# Patient Record
Sex: FEMALE
[De-identification: names, ages, dates, MRNs, and addresses within clinical notes are randomized per-mention and may not be internally consistent; named-entity substitution may affect disease eponyms.]

---

## 2018-09-06 ENCOUNTER — HOSPITAL ENCOUNTER (OUTPATIENT)
Dept: HOSPITAL 14 - H.ER | Age: 58
Setting detail: OBSERVATION
LOS: 1 days | Discharge: HOME | End: 2018-09-07
Attending: INTERNAL MEDICINE | Admitting: INTERNAL MEDICINE
Payer: COMMERCIAL

## 2018-09-06 DIAGNOSIS — J45.909: ICD-10-CM

## 2018-09-06 DIAGNOSIS — F90.9: ICD-10-CM

## 2018-09-06 DIAGNOSIS — Z90.5: ICD-10-CM

## 2018-09-06 DIAGNOSIS — L03.115: ICD-10-CM

## 2018-09-06 DIAGNOSIS — I10: ICD-10-CM

## 2018-09-06 DIAGNOSIS — E11.65: ICD-10-CM

## 2018-09-06 DIAGNOSIS — F31.9: ICD-10-CM

## 2018-09-06 DIAGNOSIS — E66.9: ICD-10-CM

## 2018-09-06 DIAGNOSIS — E03.9: ICD-10-CM

## 2018-09-06 DIAGNOSIS — L25.9: ICD-10-CM

## 2018-09-06 DIAGNOSIS — T81.4XXA: Primary | ICD-10-CM

## 2018-09-06 LAB
BASE EXCESS BLDV CALC-SCNC: 3 MMOL/L (ref 0–2)
BASOPHILS # BLD AUTO: 0.1 K/UL (ref 0–0.2)
BASOPHILS NFR BLD: 0.6 % (ref 0–2)
BUN SERPL-MCNC: 13 MG/DL (ref 7–17)
CALCIUM SERPL-MCNC: 9.2 MG/DL (ref 8.4–10.2)
EOSINOPHIL # BLD AUTO: 0.7 K/UL (ref 0–0.7)
EOSINOPHIL NFR BLD: 6.5 % (ref 0–4)
ERYTHROCYTE [DISTWIDTH] IN BLOOD BY AUTOMATED COUNT: 14 % (ref 11.5–14.5)
ERYTHROCYTE [SEDIMENTATION RATE] IN BLOOD: 21 MM/HR (ref 0–30)
GFR NON-AFRICAN AMERICAN: > 60
HGB BLD-MCNC: 15.2 G/DL (ref 12–16)
LYMPHOCYTES # BLD AUTO: 1.9 K/UL (ref 1–4.3)
LYMPHOCYTES NFR BLD AUTO: 17.9 % (ref 20–40)
MCH RBC QN AUTO: 31.8 PG (ref 27–31)
MCHC RBC AUTO-ENTMCNC: 33.9 G/DL (ref 33–37)
MCV RBC AUTO: 94 FL (ref 81–99)
MONOCYTES # BLD: 0.6 K/UL (ref 0–0.8)
MONOCYTES NFR BLD: 6 % (ref 0–10)
NEUTROPHILS # BLD: 7.2 K/UL (ref 1.8–7)
NEUTROPHILS NFR BLD AUTO: 69 % (ref 50–75)
NRBC BLD AUTO-RTO: 0 % (ref 0–0)
PCO2 BLDV: 46 MMHG (ref 40–60)
PH BLDV: 7.4 [PH] (ref 7.32–7.43)
PLATELET # BLD: 236 K/UL (ref 130–400)
PMV BLD AUTO: 9 FL (ref 7.2–11.7)
RBC # BLD AUTO: 4.77 MIL/UL (ref 3.8–5.2)
VENOUS BLOOD FIO2: 21 %
VENOUS BLOOD GAS PO2: 31 MM/HG (ref 30–55)
WBC # BLD AUTO: 10.4 K/UL (ref 4.8–10.8)

## 2018-09-06 PROCEDURE — 82948 REAGENT STRIP/BLOOD GLUCOSE: CPT

## 2018-09-06 PROCEDURE — 81003 URINALYSIS AUTO W/O SCOPE: CPT

## 2018-09-06 PROCEDURE — 86140 C-REACTIVE PROTEIN: CPT

## 2018-09-06 PROCEDURE — 99284 EMERGENCY DEPT VISIT MOD MDM: CPT

## 2018-09-06 PROCEDURE — 87040 BLOOD CULTURE FOR BACTERIA: CPT

## 2018-09-06 PROCEDURE — 85025 COMPLETE CBC W/AUTO DIFF WBC: CPT

## 2018-09-06 PROCEDURE — 96374 THER/PROPH/DIAG INJ IV PUSH: CPT

## 2018-09-06 PROCEDURE — 87070 CULTURE OTHR SPECIMN AEROBIC: CPT

## 2018-09-06 PROCEDURE — 89051 BODY FLUID CELL COUNT: CPT

## 2018-09-06 PROCEDURE — 93971 EXTREMITY STUDY: CPT

## 2018-09-06 PROCEDURE — 88104 CYTOPATH FL NONGYN SMEARS: CPT

## 2018-09-06 PROCEDURE — 85730 THROMBOPLASTIN TIME PARTIAL: CPT

## 2018-09-06 PROCEDURE — 80048 BASIC METABOLIC PNL TOTAL CA: CPT

## 2018-09-06 PROCEDURE — 85651 RBC SED RATE NONAUTOMATED: CPT

## 2018-09-06 PROCEDURE — 88305 TISSUE EXAM BY PATHOLOGIST: CPT

## 2018-09-06 PROCEDURE — 85610 PROTHROMBIN TIME: CPT

## 2018-09-06 PROCEDURE — 82803 BLOOD GASES ANY COMBINATION: CPT

## 2018-09-06 PROCEDURE — 20610 DRAIN/INJ JOINT/BURSA W/O US: CPT

## 2018-09-06 PROCEDURE — 89060 EXAM SYNOVIAL FLUID CRYSTALS: CPT

## 2018-09-06 PROCEDURE — 36415 COLL VENOUS BLD VENIPUNCTURE: CPT

## 2018-09-06 NOTE — CP.PCM.HP
<VolodymyrLatricia - Last Filed: 18 05:44>





History of Present Illness





- History of Present Illness


History of Present Illness: 


57 yr old F presents to ED with complaint of right knee pain, swelling and 

erythema s/p meniscusectomy 2 wks ago, reports completing a full course of 

Kephlex as prescribed x 10 days. PMHx includes NIDDM Type 2 and hypothyroidism. 

Patient states she went for her 1 week followup with Dr. Galvan and her right 

knee started slightly swelling up, he changed the stitches and redressed the 

wound, restarted Kephlex.  A few days ago she noticed further pain, swelling 

and yellow discharge, she called Dr. Galvan and he told her to come to ED. She 

developed an allergic reaction to the tape surrounding the meniscusectomy site. 





Specialists: Dr. Galvan





PMHx: NIDDM Type 2 and hypothyroidism, hysterectomy, bipolar disorder


SurgHx: recent right knee menisectomy, right nephrectomy, 


FMHx: mother  at 81-mitral valve replaced x 3; father  at 86-colon 

cancer


SocHx: denies tobacco/Etoh or drugs


Medications: Levothyroxine 100 mcg PO QD, Cytomel 25mcg PO QD, Abilify 5mg PO 

QAM, Zoloft 50mg PO QAM, Metformin 500mg PO QD, Klonopin 1.5mg PO QPM, Klonopin 

1mg PRN in 0.5mg doses; Trazodone 100mg QPM, Prazosin 4mg PO QPM, Victoza 1.8 

PO QAM, Adderall PRN, Proair PRN


Allergies: levaquin, latuda, lamictal, IV contrast (anaphylaxis)


Code status: Full code


Emergency contact: Sandee Cruz (sister) [phone]


pharmacy:





ER course: BP  118/83 mmHg, HR 81 bpm, Temp 98.3F, Resp rate 20, SpO2 96% on 

room air


-CBC: wnl, VBG wnl, 


-CMP: random glucose 146, rest wnl, CRP wnl, 


-UA negative for active disease


-ED treatment: NS 1 100mls/hr





Present on Admission





- Present on Admission


Any Indicators Present on Admission: Yes


History of DVT/PE: No


History of Uncontrolled Diabetes: No


Urinary Catheter: No


Decubitus Ulcer Present: No


History Surgical Site Infection Following: Orthopedic Procedures





Review of Systems





- Constitutional


Constitutional: absent: Chills, Fever





- EENT


Eyes: absent: Change in Vision


Nose/Mouth/Throat: absent: Nasal Congestion





- Cardiovascular


Cardiovascular: absent: Chest Pain, Dyspnea





- Respiratory


Respiratory: absent: Cough, Dyspnea, Hemoptysis





- Gastrointestinal


Gastrointestinal: absent: Abdominal Pain, Nausea, Vomiting





- Genitourinary


Genitourinary: absent: Difficulty Urinating, Dysuria





- Musculoskeletal


Musculoskeletal: Limited Range of Motion (right knee due to pain).  absent: 

Muscle Weakness, Numbness, Tingling





- Integumentary


Integumentary: Wounds (right knee meniscusecotmy incision covered with steri 

strips)





- Neurological


Neurological: absent: Abnormal Gait, Confusion, Dizziness, Numbness, Headaches, 

Weakness





- Psychiatric


Psychiatric: Depression (bipolar disorder)





- Endocrine


Endocrine: absent: Polydipsia, Polyphagia, Polyuria





- Hematologic/Lymphatic


Hematologic: absent: Easy Bleeding, Easy Bruising





Meds


Allergies/Adverse Reactions: 


 Allergies











Allergy/AdvReac Type Severity Reaction Status Date / Time


 


Iodinated Contrast- Oral and Allergy  RASH Verified 18 20:51





IV Dye     


 


iodine Allergy  RASH Verified 18 20:51


 


lamotrigine [From Lamictal] Allergy  RASH Verified 18 20:50


 


levofloxacin [From Levaquin] Allergy  RASH Verified 18 20:50


 


lurasidone [From Latuda] Allergy  RASH Verified 18 20:50














Physical Exam





- Constitutional


Appears: No Acute Distress





- Head Exam


Head Exam: ATRAUMATIC, NORMOCEPHALIC





- Eye Exam


Eye Exam: EOMI, PERRL


Pupil Exam: PERRL





- ENT Exam


ENT Exam: Mucous Membranes Moist





- Neck Exam


Neck exam: Positive for: Full Rom.  Negative for: Lymphadenopathy





- Respiratory Exam


Respiratory Exam: Clear to Auscultation Bilateral, NORMAL BREATHING PATTERN.  

absent: Rales, Rhonchi





- Cardiovascular Exam


Cardiovascular Exam: REGULAR RHYTHM, +S1, +S2





- GI/Abdominal Exam


GI & Abdominal Exam: Normal Bowel Sounds, Soft (obese)





- Extremities Exam


Extremities exam: Positive for: full ROM, normal capillary refill, pedal pulses 

present.  Negative for: pedal edema





- Neurological Exam


Neurological exam: Alert, CN II-XII Intact, Oriented x3





- Psychiatric Exam


Psychiatric exam: Normal Affect, Normal Mood





- Skin


Skin Exam: Dry, Warm





Results





- Vital Signs


Recent Vital Signs: 





 Last Vital Signs











Temp  98.3 F   18 20:51


 


Pulse  81   18 20:51


 


Resp  20   18 20:51


 


BP  118/83   18 20:51


 


Pulse Ox  96   18 22:35














- Labs


Result Diagrams: 


 18 21:47





 18 21:47


Labs: 





 Laboratory Results - last 24 hr











  18





  21:47 21:47 22:21


 


WBC   10.4 


 


RBC   4.77 


 


Hgb   15.2 


 


Hct   44.8 


 


MCV   94.0 


 


MCH   31.8 H 


 


MCHC   33.9 


 


RDW   14.0 


 


Plt Count   236 


 


MPV   9.0 


 


Neut % (Auto)   69.0 


 


Lymph % (Auto)   17.9 L 


 


Mono % (Auto)   6.0 


 


Eos % (Auto)   6.5 H 


 


Baso % (Auto)   0.6 


 


Neut # (Auto)   7.2 H 


 


Lymph # (Auto)   1.9 


 


Mono # (Auto)   0.6 


 


Eos # (Auto)   0.7 


 


Baso # (Auto)   0.1 


 


pO2    31


 


VBG pH    7.40


 


VBG pCO2    46


 


VBG HCO3    26.2


 


VBG Total CO2    29.9 H


 


VBG O2 Sat (Calc)    70.6 H


 


VBG Base Excess    3.0 H


 


VBG Potassium    4.0


 


Glucose    148 H


 


Lactate    1.1


 


FiO2    21.0


 


Sodium  140   137.0


 


Potassium  4.1  


 


Chloride  106   104.0


 


Carbon Dioxide  26  


 


Anion Gap  12  


 


BUN  13  


 


Creatinine  0.8  


 


Est GFR ( Amer)  > 60  


 


Est GFR (Non-Af Amer)  > 60  


 


Random Glucose  146 H  


 


Calcium  9.2  


 


Venous Blood Potassium    4.0














Assessment & Plan





- Assessment and Plan (Free Text)


Assessment: 


57 yr old f admitted for cellulitis of RLE with PMHx including NIDDM Type 2 and 

hypothyroidism, hysterectomy, bipolar disorder/anxiety/ADHD.





RLE cellulitis


-acute


-admit to med/surge


-Ancef IV Q8


-orthopedic consult appreciated: Dr. Galvan


-NPO after midnight for possible OR procedure tomorrow


-f/u blood and urine culture





NIDDM Type 2


chronic, controlled


-continue home medications (Victoza 1.8 PO QAM, Prazosin 4mg PO QPM)


-held Metformin 500mg PO QD





Hypothyroidism


-chronic, controlled


-continue home medication (Levothyroxine 100 mcg PO QD, Cytomel 25mcg PO QD)





Bipolar disorder/Depression/ADHD:


-chonic, controlled 


-continue home medications (Abilify 5mg PO QAM, Zoloft 50mg PO QAM, Klonopin 

1.5mg PO QPM, Klonopin 1mg PRN in 0.5mg doses; Trazodone 100mg QPM) Adderall PRN

)























- Date & Time


Date: 18


Time: 22:30





<Chandana Cherry - Last Filed: 18 06:34>





Results





- Vital Signs


Recent Vital Signs: 





 Last Vital Signs











Temp  97.8 F   18 01:04


 


Pulse  76   18 01:04


 


Resp  18   18 01:04


 


BP  104/72   18 01:04


 


Pulse Ox  96   18 02:30














- Labs


Result Diagrams: 


 18 21:47





 18 21:47


Labs: 





 Laboratory Results - last 24 hr











  18





  21:47 21:47 22:21


 


WBC   10.4 


 


RBC   4.77 


 


Hgb   15.2 


 


Hct   44.8 


 


MCV   94.0 


 


MCH   31.8 H 


 


MCHC   33.9 


 


RDW   14.0 


 


Plt Count   236 


 


MPV   9.0 


 


Neut % (Auto)   69.0 


 


Lymph % (Auto)   17.9 L 


 


Mono % (Auto)   6.0 


 


Eos % (Auto)   6.5 H 


 


Baso % (Auto)   0.6 


 


Neut # (Auto)   7.2 H 


 


Lymph # (Auto)   1.9 


 


Mono # (Auto)   0.6 


 


Eos # (Auto)   0.7 


 


Baso # (Auto)   0.1 


 


ESR   21 


 


pO2    31


 


VBG pH    7.40


 


VBG pCO2    46


 


VBG HCO3    26.2


 


VBG Total CO2    29.9 H


 


VBG O2 Sat (Calc)    70.6 H


 


VBG Base Excess    3.0 H


 


VBG Potassium    4.0


 


Glucose    148 H


 


Lactate    1.1


 


FiO2    21.0


 


Sodium  140   137.0


 


Potassium  4.1  


 


Chloride  106   104.0


 


Carbon Dioxide  26  


 


Anion Gap  12  


 


BUN  13  


 


Creatinine  0.8  


 


Est GFR ( Amer)  > 60  


 


Est GFR (Non-Af Amer)  > 60  


 


POC Glucose (mg/dL)   


 


Random Glucose  146 H  


 


Calcium  9.2  


 


Venous Blood Potassium    4.0


 


Urine Color   


 


Urine Clarity   


 


Urine pH   


 


Ur Specific Gravity   


 


Urine Protein   


 


Urine Glucose (UA)   


 


Urine Ketones   


 


Urine Blood   


 


Urine Nitrate   


 


Urine Bilirubin   


 


Urine Urobilinogen   


 


Ur Leukocyte Esterase   


 


Urine RBC (Auto)   


 


Urine Microscopic WBC   


 


Ur Squamous Epith Cells   














  18





  00:22 05:39


 


WBC  


 


RBC  


 


Hgb  


 


Hct  


 


MCV  


 


MCH  


 


MCHC  


 


RDW  


 


Plt Count  


 


MPV  


 


Neut % (Auto)  


 


Lymph % (Auto)  


 


Mono % (Auto)  


 


Eos % (Auto)  


 


Baso % (Auto)  


 


Neut # (Auto)  


 


Lymph # (Auto)  


 


Mono # (Auto)  


 


Eos # (Auto)  


 


Baso # (Auto)  


 


ESR  


 


pO2  


 


VBG pH  


 


VBG pCO2  


 


VBG HCO3  


 


VBG Total CO2  


 


VBG O2 Sat (Calc)  


 


VBG Base Excess  


 


VBG Potassium  


 


Glucose  


 


Lactate  


 


FiO2  


 


Sodium  


 


Potassium  


 


Chloride  


 


Carbon Dioxide  


 


Anion Gap  


 


BUN  


 


Creatinine  


 


Est GFR ( Amer)  


 


Est GFR (Non-Af Amer)  


 


POC Glucose (mg/dL)   98


 


Random Glucose  


 


Calcium  


 


Venous Blood Potassium  


 


Urine Color  Yellow 


 


Urine Clarity  Slighty-cloudy 


 


Urine pH  5.0 


 


Ur Specific Gravity  1.027 


 


Urine Protein  30 


 


Urine Glucose (UA)  Neg 


 


Urine Ketones  Negative 


 


Urine Blood  Negative 


 


Urine Nitrate  Negative 


 


Urine Bilirubin  Negative 


 


Urine Urobilinogen  0.2-1.0 


 


Ur Leukocyte Esterase  Trace 


 


Urine RBC (Auto)  4 H 


 


Urine Microscopic WBC  3 


 


Ur Squamous Epith Cells  1 














Attending/Attestation





- Attestation


I have personally seen and examined this patient.: Yes


I have fully participated in the care of the patient.: Yes


I have reviewed all pertinent clinical information: Yes


Notes (Text): 





18 06:25


I saw and examined this patient shoulder to shoulder with Dr Helm. The 

assessment and plan represent my direct imput.


This is a patient with hx of DM II  who underwent a right Meniscucusectomy 2 

weeks ago  and developed swelling one week later causing the surgeon to change 

the sutures. She now reports oozing of yellowish fluid from a region of the 

incision line and has come to the ED for management.


A&P


#. Right knee infected surgical wound with cellulites


- Dr Galvan on consult (Orthopedic


- NPO after midnight


- Cefazolin


- Pain management





#. DM II with hyperglycemia


- Lispro sliding scale according to accucheck


- Victosa (patient has her own medication


- Metformin on hold





#.  Bipolar disorder 


- Continue Home medication





#. DVT prophylaxis: Start Lovenox post surgery.

## 2018-09-06 NOTE — ED PDOC
Lower Extremity Pain/Injury


Time Seen by Provider: 09/06/18 21:12


Chief Complaint (Nursing): Lower Extremity Problem/Injury


Chief Complaint (Provider): Right knee discomfort


History Per: Patient


History/Exam Limitations: no limitations


Onset/Duration Of Symptoms: Days


Current Symptoms Are (Timing): Still Present


Additional History Per: Patient


Additional Complaint(s): 


56yo female, history of diabetes, with recent right menisceal repair by Dr. Galvan 2 weeks ago. Patient states 6 days ago, she noticed increased swelling 

to her right knee as well as drainage from her wound site. Patient was seen by 

Dr. Galvan yesterday and was started on Keflex. Patient states since yesterday, 

she noted a rash to her knee and reports the area appears even more swollen. 

She denies any fever or chills and states she has good ROM of her right knee.








Past Medical History


Reviewed: Historical Data, Nursing Documentation, Vital Signs


Vital Signs: 





 Last Vital Signs











Temp  98.3 F   09/06/18 20:51


 


Pulse  81   09/06/18 20:51


 


Resp  20   09/06/18 20:51


 


BP  118/83   09/06/18 20:51


 


Pulse Ox  96   09/06/18 20:51














- Medical History


PMH: Diabetes





- Surgical History


Other surgeries: right knee surgery





- Family History


Family History: States: No Known Family Hx





- Home Medications


Home Medications: 


 Ambulatory Orders











 Medication  Instructions  Recorded


 


ARIPiprazole [Abilify] 5 mg PO DAILY 09/06/18


 


Albuterol Sulfate [Proair Hfa] 2 puff INH PRN PRN 09/06/18


 


Amphetamine Salt Combination 5 mg PO PRN PRN 09/06/18





[Adderall]  


 


Cephalexin [Keflex] 500 mg PO TID 09/06/18


 


Clonazepam [Klonopin] 0.5 mg PO QID PRN 09/06/18


 


Levothyroxine [Synthroid] 100 mcg PO QAM 09/06/18


 


Liothyronine [Cytomel] 25 mcg PO QAM 09/06/18


 


Liraglutide [Victoza 3-Denys] 1.8 mg SC DAILY 09/06/18


 


Metformin HCl [Glucophage] 500 mg PO BID 09/06/18


 


Prazosin HCL [Minipress] 4 mg PO HS 09/06/18


 


Sertraline [Zoloft] 50 mg PO DAILY 09/06/18


 


traZODone [Desyrel] 200 mg PO HS 09/06/18














- Allergies


Allergies/Adverse Reactions: 


 Allergies











Allergy/AdvReac Type Severity Reaction Status Date / Time


 


Iodinated Contrast- Oral and Allergy  RASH Verified 09/06/18 20:51





IV Dye     


 


iodine Allergy  RASH Verified 09/06/18 20:51


 


lamotrigine [From Lamictal] Allergy  RASH Verified 09/06/18 20:50


 


levofloxacin [From Levaquin] Allergy  RASH Verified 09/06/18 20:50


 


lurasidone [From Latuda] Allergy  RASH Verified 09/06/18 20:50














Review of Systems


ROS Statement: Except As Marked, All Systems Reviewed And Found Negative (per 

HPI)


Constitutional: Negative for: Fever, Chills


Musculoskeletal: Positive for: Other (increased swelling + rash to right knee)





Physical Exam





- Reviewed


Nursing Documentation Reviewed: Yes


Vital Signs Reviewed: Yes





- Physical Exam


Appears: Positive for: Non-toxic


Head Exam: Positive for: ATRAUMATIC, NORMAL INSPECTION, NORMOCEPHALIC


Skin: Positive for: Warm, Rash (punctate erythematous rash surrounding right 

knee; no petechiae. no)


Eye Exam: Positive for: EOMI, Normal appearance, PERRL


ENT: Positive for: Normal ENT Inspection


Neck: Positive for: Normal, Painless ROM


Cardiovascular/Chest: Positive for: Regular Rate, Rhythm


Respiratory: Positive for: CNT, Normal Breath Sounds


Gastrointestinal/Abdominal: Positive for: Normal Exam, Soft


Back: Positive for: Normal Inspection


Extremity: Positive for: Normal ROM (Full ROM right knee), Tenderness (minimal 

tenderness to palpation of right knee), Other (steri-strips in place in right 

knee with mild serous drainage)


Neurologic/Psych: Positive for: Alert, Oriented





- Laboratory Results


Result Diagrams: 


 09/06/18 21:47





 09/06/18 21:47





- ECG


O2 Sat by Pulse Oximetry: 96 (RA)


Pulse Ox Interpretation: Normal





Medical Decision Making


Medical Decision Making: 


Assessment: Possible developing cellulitis


Plan:


Dr. Galvan aware of case.


Will await for possible joint aspiration for wound culture prior to IV 

antibiotics administration.


-- Labs


-- Blood culture





21:37


Case discussed with Dr. Cherry and patient admitted to med-surg.








Scribe Attestation:


Documented by Kira Gunter, acting as a scribe for Neo Valdes MD





Provider Scribe Attestation:


All medical record entries made by the Scribe were at my direction and 

personally dictated by me. I have reviewed the chart and agree that the record 

accurately reflects my personal performance of the history, physical exam, 

medical decision making, and the department course for this patient. I have 

also personally directed, reviewed, and agree with the discharge instructions 

and disposition.








Disposition





- Clinical Impression


Clinical Impression: 


 Knee swelling








- Disposition


Disposition Time: 21:00


Condition: FAIR

## 2018-09-07 VITALS
TEMPERATURE: 97.6 F | SYSTOLIC BLOOD PRESSURE: 111 MMHG | HEART RATE: 68 BPM | OXYGEN SATURATION: 95 % | DIASTOLIC BLOOD PRESSURE: 77 MMHG

## 2018-09-07 VITALS — RESPIRATION RATE: 18 BRPM

## 2018-09-07 LAB
APTT BLD: 31.5 SECONDS (ref 25.6–37.1)
BILIRUB UR-MCNC: NEGATIVE MG/DL
BODY FLD TYPE: (no result)
COLOR UR: YELLOW
GLUCOSE UR STRIP-MCNC: (no result) MG/DL
INR PPP: 1.1
LEUKOCYTE ESTERASE UR-ACNC: (no result) LEU/UL
PH UR STRIP: 5 [PH] (ref 5–8)
PROT UR STRIP-MCNC: 30 MG/DL
PROTHROMBIN TIME: 12 SECONDS (ref 9.8–13.1)
RBC # UR STRIP: NEGATIVE /UL
SF GROSS APPEARANCE: (no result)
SP GR UR STRIP: 1.03 (ref 1–1.03)
SQUAMOUS EPITHIAL: 1 /HPF (ref 0–5)
SYNOVIAL FLUID COMMENT: (no result)
SYNOVIAL FLUID MONO/MACROPHAGE: 1 % (ref 0–0)
URINE CLARITY: (no result)
UROBILINOGEN UR-MCNC: (no result) MG/DL (ref 0.2–1)

## 2018-09-07 RX ADMIN — INSULIN LISPRO SCH: 100 INJECTION, SOLUTION INTRAVENOUS; SUBCUTANEOUS at 11:03

## 2018-09-07 RX ADMIN — INSULIN LISPRO SCH: 100 INJECTION, SOLUTION INTRAVENOUS; SUBCUTANEOUS at 07:55

## 2018-09-07 NOTE — US
Date of service: 



09/07/2018



PROCEDURE:  Right lower extremity venous duplex Doppler. 



HISTORY:

RLE pain, cellulitis, recent meniscectomy







COMPARISON:

None available.



TECHNIQUE:

Common femoral, superficial femoral, popliteal and posterior tibial 

veins were evaluated. Flow was assessed with color Doppler, 

compressibility, assessment of phasic flow and augmentation response. 



FINDINGS:



COMMON FEMORAL VEIN:

Unremarkable.



SUPERFICIAL FEMORAL VEIN:

Unremarkable.



POPLITEAL VEIN:

Unremarkable.



POSTERIOR TIBIAL VEIN:

Unremarkable.



OTHER FINDINGS:

None.



IMPRESSION:

No evidence of deep venous thrombosis in the right lower extremity.

## 2018-09-07 NOTE — CP.PCM.CON
History of Present Illness





- History of Present Illness


History of Present Illness: 





orthopedic consultation Dr. Galvan





57F complains of right knee pain and drainage x 6 days s/p right knee 

arthroscopy approx 2 weeks. She says she was seen by Dr. Galvan and started on 

keflex, but the redness in the front of her knee has not changed. She says a 

rash started on the front of her leg that was where the ace bandage was. She 

says she frequently gets rash from bandaids/tape. She says the pain in her knee 

hasn't changed, and she has minimal pain bending knee. Denies fever/chills. 

Denies CP/SOB/dizziness. 





Past Patient History





- Past Medical History & Family History


Past Medical History?: Yes





- Past Social History


Smoking Status: Never Smoked





- CARDIAC


Hx Cardiac Disorders: Yes


Hx Hypertension: Yes





- PULMONARY


Hx Respiratory Disorders: Yes


Hx Asthma: Yes





- NEUROLOGICAL


Hx Neurological Disorder: No





- HEENT


Hx HEENT Problems: No





- RENAL


Hx Chronic Kidney Disease: No





- ENDOCRINE/METABOLIC


Hx Endocrine Disorders: Yes


Hx Diabetes Mellitus Type 2: Yes


Hx Hypothyroidism: Yes





- HEMATOLOGICAL/ONCOLOGICAL


Hx Blood Disorders: No





- INTEGUMENTARY


Hx Dermatological Problems: No





- MUSCULOSKELETAL/RHEUMATOLOGICAL


Hx Musculoskeletal Disorders: Yes


Hx Falls: No


Other/Comment: Right knee meniscal tear 2 weeks ago





- GASTROINTESTINAL


Hx Gastrointestinal Disorders: No





- GENITOURINARY/GYNECOLOGICAL


Hx Genitourinary Disorders: No





- PSYCHIATRIC


Hx Psychophysiologic Disorder: Yes


Hx Bipolar Disorder: Yes


Hx Substance Use: No





- SURGICAL HISTORY


Hx Surgeries: Yes


Hx Orthopedic Surgery: Yes


Other/Comment: R knee meniscal tear 2 weeks ago





- ANESTHESIA


Hx Anesthesia: Yes


Hx Anesthesia Reactions: No


Hx Malignant Hyperthermia: No


Has any member of the family had a problem w/ anesthesia?: Yes





Meds


Allergies/Adverse Reactions: 


 Allergies











Allergy/AdvReac Type Severity Reaction Status Date / Time


 


Iodinated Contrast- Oral and Allergy  RASH Verified 09/06/18 20:51





IV Dye     


 


iodine Allergy  RASH Verified 09/06/18 20:51


 


lamotrigine [From Lamictal] Allergy  RASH Verified 09/06/18 20:50


 


levofloxacin [From Levaquin] Allergy  RASH Verified 09/06/18 20:50


 


lurasidone [From Latuda] Allergy  RASH Verified 09/06/18 20:50














- Medications


Medications: 


 Current Medications





Acetaminophen (Tylenol 325mg Tab)  650 mg PO Q6 PRN


   PRN Reason: Pain, moderate (4-7)


Albuterol (Ventolin Hfa 90 Mcg/Actuation (8 G))  2 puff INH RQ6 PRN


   PRN Reason: Shortness of Breath


Amphetamine/Dextroamphetamine (Adderall)  5 mg PO DAILY PRN


   PRN Reason: ADHD


Aripiprazole (Abilify)  5 mg PO DAILY [facility]EL[facility]


Clonazepam (Klonopin)  0.5 mg PO QID PRN


   PRN Reason: Anxiety


Sodium Chloride (Sodium Chloride 0.9%)  1,000 mls @ 100 mls/hr IV .Q10H [facility]


   Last Admin: 09/07/18 00:07 Dose:  100 mls/hr


Cefazolin Sodium 1 gm/ Sodium (Chloride)  100 mls @ 100 mls/hr IVPB Q8 NELIA


   PRN Reason: Protocol


   Last Admin: 09/07/18 01:00 Dose:  Not Given


Insulin Human Lispro (Humalog)  0 units SC ACHS [facility]


   PRN Reason: Protocol


   Last Admin: 09/07/18 07:55 Dose:  Not Given


Ketorolac Tromethamine (Toradol)  30 mg IVP Q6 PRN


   PRN Reason: Pain, severe (8-10)


   Last Admin: 09/07/18 00:10 Dose:  30 mg


Levothyroxine Sodium (Synthroid)  100 mcg PO QAM@0630 [facility]


   Last Admin: 09/07/18 05:54 Dose:  100 mcg


Liothyronine Sodium (Cytomel)  25 mcg PO QAM@0630 [facility]


   Last Admin: 09/07/18 05:55 Dose:  25 mcg


Prazosin HCl (Minipress)  4 mg PO HS [facility]


Sertraline HCl (Zoloft)  50 mg PO DAILY [facility]


Trazodone HCl (Desyrel)  200 mg PO HS [facility]











Physical Exam





- Constitutional


Appears: Well, No Acute Distress





- Extremities Exam


Additional comments: 





No obvious joint effusion appreciated





- Expanded Lower Extremities Exam


  ** Right


Knee exam: erythema (erythema localized to incision sites, noted rash (not just 

red spots to leg)approx 6 in above and below kne. Full ROM of knee with minimal 

pain. )





Results





- Vital Signs


Recent Vital Signs: 


 Last Vital Signs











Temp  97.8 F   09/07/18 01:04


 


Pulse  76   09/07/18 01:04


 


Resp  18   09/07/18 01:04


 


BP  104/72   09/07/18 01:04


 


Pulse Ox  96   09/07/18 02:30














- Labs


Result Diagrams: 


 09/06/18 21:47





 09/06/18 21:47


Labs: 


 Laboratory Results - last 24 hr











  09/06/18 09/06/18 09/06/18





  21:47 21:47 22:21


 


WBC   10.4 


 


RBC   4.77 


 


Hgb   15.2 


 


Hct   44.8 


 


MCV   94.0 


 


MCH   31.8 H 


 


MCHC   33.9 


 


RDW   14.0 


 


Plt Count   236 


 


MPV   9.0 


 


Neut % (Auto)   69.0 


 


Lymph % (Auto)   17.9 L 


 


Mono % (Auto)   6.0 


 


Eos % (Auto)   6.5 H 


 


Baso % (Auto)   0.6 


 


Neut # (Auto)   7.2 H 


 


Lymph # (Auto)   1.9 


 


Mono # (Auto)   0.6 


 


Eos # (Auto)   0.7 


 


Baso # (Auto)   0.1 


 


ESR   21 


 


PT   


 


INR   


 


APTT   


 


pO2    31


 


VBG pH    7.40


 


VBG pCO2    46


 


VBG HCO3    26.2


 


VBG Total CO2    29.9 H


 


VBG O2 Sat (Calc)    70.6 H


 


VBG Base Excess    3.0 H


 


VBG Potassium    4.0


 


Glucose    148 H


 


Lactate    1.1


 


FiO2    21.0


 


Sodium  140   137.0


 


Potassium  4.1  


 


Chloride  106   104.0


 


Carbon Dioxide  26  


 


Anion Gap  12  


 


BUN  13  


 


Creatinine  0.8  


 


Est GFR ( Amer)  > 60  


 


Est GFR (Non-Af Amer)  > 60  


 


POC Glucose (mg/dL)   


 


Random Glucose  146 H  


 


Calcium  9.2  


 


Venous Blood Potassium    4.0


 


Urine Color   


 


Urine Clarity   


 


Urine pH   


 


Ur Specific Gravity   


 


Urine Protein   


 


Urine Glucose (UA)   


 


Urine Ketones   


 


Urine Blood   


 


Urine Nitrate   


 


Urine Bilirubin   


 


Urine Urobilinogen   


 


Ur Leukocyte Esterase   


 


Urine RBC (Auto)   


 


Urine Microscopic WBC   


 


Ur Squamous Epith Cells   














  09/07/18 09/07/18 09/07/18





  00:22 05:39 06:10


 


WBC   


 


RBC   


 


Hgb   


 


Hct   


 


MCV   


 


MCH   


 


MCHC   


 


RDW   


 


Plt Count   


 


MPV   


 


Neut % (Auto)   


 


Lymph % (Auto)   


 


Mono % (Auto)   


 


Eos % (Auto)   


 


Baso % (Auto)   


 


Neut # (Auto)   


 


Lymph # (Auto)   


 


Mono # (Auto)   


 


Eos # (Auto)   


 


Baso # (Auto)   


 


ESR   


 


PT    12.0


 


INR    1.1


 


APTT    31.5


 


pO2   


 


VBG pH   


 


VBG pCO2   


 


VBG HCO3   


 


VBG Total CO2   


 


VBG O2 Sat (Calc)   


 


VBG Base Excess   


 


VBG Potassium   


 


Glucose   


 


Lactate   


 


FiO2   


 


Sodium   


 


Potassium   


 


Chloride   


 


Carbon Dioxide   


 


Anion Gap   


 


BUN   


 


Creatinine   


 


Est GFR ( Amer)   


 


Est GFR (Non-Af Amer)   


 


POC Glucose (mg/dL)   98 


 


Random Glucose   


 


Calcium   


 


Venous Blood Potassium   


 


Urine Color  Yellow  


 


Urine Clarity  Slighty-cloudy  


 


Urine pH  5.0  


 


Ur Specific Gravity  1.027  


 


Urine Protein  30  


 


Urine Glucose (UA)  Neg  


 


Urine Ketones  Negative  


 


Urine Blood  Negative  


 


Urine Nitrate  Negative  


 


Urine Bilirubin  Negative  


 


Urine Urobilinogen  0.2-1.0  


 


Ur Leukocyte Esterase  Trace  


 


Urine RBC (Auto)  4 H  


 


Urine Microscopic WBC  3  


 


Ur Squamous Epith Cells  1  














Assessment & Plan


(1) Wound infection after surgery


Assessment and Plan: 





RIsks/benefits/alt of right knee aspiration explained to patient who verbalized 

understanding and consented to procedure. Right knee prepped with chloroprep 

and under sterile conditions superiorlateral approach 5cc yellow/red fluid 

aspirated, not cloudy. Patient tolerated procedure well, no complications.








fluid cell count/gram stain/culture/crystals sent stat


remove steristrips as possible local hypersensitivity reaction vs superficial 

wound infection


awaiting results


d/w Dr. Galvan, agrees with above, to see patient


Status: Acute

## 2018-09-07 NOTE — CP.PCM.DIS
Provider





- Provider


Date of Admission: 


09/06/18 21:37





Attending physician: 


Chandana Cherry





Time Spent in preparation of Discharge (in minutes): 25





Diagnosis





- Discharge Diagnosis


(1) Superficial bacterial skin infection


Status: Acute   





(2) Contact dermatitis


Status: Acute   





(3) Knee swelling


Status: Resolved   





(4) Wound infection after surgery


Status: Acute   





Hospital Course





- Lab Results


Lab Results: 


 Micro Results





09/07/18 09:16   Knee - Right   Gram Stain - Final


09/07/18 09:02   Synovial Fluid   Gram Stain - Final





 Most Recent Lab Values











WBC  10.4 K/uL (4.8-10.8)   09/06/18  21:47    


 


RBC  4.77 Mil/uL (3.80-5.20)   09/06/18  21:47    


 


Hgb  15.2 g/dL (12.0-16.0)   09/06/18  21:47    


 


Hct  44.8 % (34.0-47.0)   09/06/18  21:47    


 


MCV  94.0 fl (81.0-99.0)   09/06/18  21:47    


 


MCH  31.8 pg (27.0-31.0)  H  09/06/18  21:47    


 


MCHC  33.9 g/dL (33.0-37.0)   09/06/18  21:47    


 


RDW  14.0 % (11.5-14.5)   09/06/18  21:47    


 


Plt Count  236 K/uL (130-400)   09/06/18  21:47    


 


MPV  9.0 fl (7.2-11.7)   09/06/18  21:47    


 


Neut % (Auto)  69.0 % (50.0-75.0)   09/06/18  21:47    


 


Lymph % (Auto)  17.9 % (20.0-40.0)  L  09/06/18  21:47    


 


Mono % (Auto)  6.0 % (0.0-10.0)   09/06/18  21:47    


 


Eos % (Auto)  6.5 % (0.0-4.0)  H  09/06/18  21:47    


 


Baso % (Auto)  0.6 % (0.0-2.0)   09/06/18  21:47    


 


Neut # (Auto)  7.2 K/uL (1.8-7.0)  H  09/06/18  21:47    


 


Lymph # (Auto)  1.9 K/uL (1.0-4.3)   09/06/18  21:47    


 


Mono # (Auto)  0.6 K/uL (0.0-0.8)   09/06/18  21:47    


 


Eos # (Auto)  0.7 K/uL (0.0-0.7)   09/06/18  21:47    


 


Baso # (Auto)  0.1 K/uL (0.0-0.2)   09/06/18  21:47    


 


ESR  21 mm/hr (0-30)   09/06/18  21:47    


 


PT  12.0 Seconds (9.8-13.1)   09/07/18  06:10    


 


INR  1.1   09/07/18  06:10    


 


APTT  31.5 Seconds (25.6-37.1)   09/07/18  06:10    


 


pO2  31 mm/Hg (30-55)   09/06/18  22:21    


 


VBG pH  7.40  (7.32-7.43)   09/06/18  22:21    


 


VBG pCO2  46 mmHg (40-60)   09/06/18  22:21    


 


VBG HCO3  26.2 mmol/L  09/06/18  22:21    


 


VBG Total CO2  29.9 mmol/L (22-28)  H  09/06/18  22:21    


 


VBG O2 Sat (Calc)  70.6 % (40-65)  H  09/06/18  22:21    


 


VBG Base Excess  3.0 mmol/L (0.0-2.0)  H  09/06/18  22:21    


 


VBG Potassium  4.0 mmol/L (3.6-5.2)   09/06/18  22:21    


 


Sodium  137.0 mmol/L (132-148)   09/06/18  22:21    


 


Chloride  104.0 mmol/L ()   09/06/18  22:21    


 


Glucose  148 mg/dL ()  H  09/06/18  22:21    


 


Lactate  1.1 mmol/L (0.7-2.1)   09/06/18  22:21    


 


FiO2  21.0 %  09/06/18  22:21    


 


Sodium  140 mmol/l (132-148)   09/06/18  21:47    


 


Potassium  4.1 MMOL/L (3.6-5.0)   09/06/18  21:47    


 


Chloride  106 mmol/L ()   09/06/18  21:47    


 


Carbon Dioxide  26 mmol/L (22-30)   09/06/18  21:47    


 


Anion Gap  12  (10-20)   09/06/18  21:47    


 


BUN  13 mg/dl (7-17)   09/06/18  21:47    


 


Creatinine  0.8 mg/dl (0.7-1.2)   09/06/18  21:47    


 


Est GFR ( Amer)  > 60   09/06/18  21:47    


 


Est GFR (Non-Af Amer)  > 60   09/06/18  21:47    


 


POC Glucose (mg/dL)  120 mg/dL ()  H  09/07/18  10:39    


 


Random Glucose  146 mg/dL ()  H  09/06/18  21:47    


 


Calcium  9.2 mg/dL (8.4-10.2)   09/06/18  21:47    


 


C-Reactive Protein  9.00 mg/L (0.0-9.9)   09/06/18  21:47    


 


Venous Blood Potassium  4.0 mmol/L (3.6-5.2)   09/06/18  22:21    


 


Urine Color  Yellow  (YELLOW)   09/07/18  00:22    


 


Urine Clarity  Slighty-cloudy  (Clear)   09/07/18  00:22    


 


Urine pH  5.0  (5.0-8.0)   09/07/18  00:22    


 


Ur Specific Gravity  1.027  (1.003-1.030)   09/07/18  00:22    


 


Urine Protein  30 mg/dL (NEGATIVE)   09/07/18  00:22    


 


Urine Glucose (UA)  Neg mg/dL (Normal)   09/07/18  00:22    


 


Urine Ketones  Negative mg/dL (NEGATIVE)   09/07/18  00:22    


 


Urine Blood  Negative  (NEGATIVE)   09/07/18  00:22    


 


Urine Nitrate  Negative  (NEGATIVE)   09/07/18  00:22    


 


Urine Bilirubin  Negative  (NEGATIVE)   09/07/18  00:22    


 


Urine Urobilinogen  0.2-1.0 mg/dL (0.2-1.0)   09/07/18  00:22    


 


Ur Leukocyte Esterase  Trace Cayla/uL (Negative)   09/07/18  00:22    


 


Urine RBC (Auto)  4 /hpf (0-3)  H  09/07/18  00:22    


 


Urine Microscopic WBC  3 /hpf (0-5)   09/07/18  00:22    


 


Ur Squamous Epith Cells  1 /hpf (0-5)   09/07/18  00:22    


 


Fluid Type  Synovial fluid   09/07/18  09:02    


 


Synovial WBC  11.0 /mm3 (0.0-150.0)   09/07/18  09:02    


 


Synovial RBC  80857.0 /mm3 (0.0-0.0)  H  09/07/18  09:02    


 


Synovial Neutrophils  7.0 % (0-0)  H  09/07/18  09:02    


 


Synovial Lymphocytes  2.0 % (0-0)  H  09/07/18  09:02    


 


Synov Monos/Macrophage  1 % (0-0)  H  09/07/18  09:02    


 


Synovial Fluid Comment  Mucoid   09/07/18  09:02    














- Hospital Course


Hospital Course: 





57 yr old female with pmhx  of NIDDM Type 2 and hypothyroidism presents to ED 

on 9/6/18 with complaint of right knee pain, swelling and erythema s/p 

meniscusectomy 2 wks ago. Pt was seen by Dr. Galvan few days ago and started on 

keflex 500 mg po TID 3 days ago for 10 days.  She noticed further pain, 

swelling and yellow discharge, she called Dr. Galvan and he told her to come to 

ED for further evaluation. She developed an allergic reaction to the tape 

surrounding the meniscusectomy site few days ago. 





Today, pt's right knee joint was aspirated and fluid analysis showed no 

infection (WBC 11, RBC 80304, neutrophils 7, lymphocytes 2, mono/macrophages 1 

and gram stain few wbc no organisms. Pt has been afebrile with stable vitals 

since the admission. Pt is medically stable to discharge home and advised to 

continue with keflex. Pt has an appointment with Dr. Galvan on Monday, 9/10/18.








Discharge Exam





- Head Exam


Head Exam: NORMAL INSPECTION





- Eye Exam


Eye Exam: Normal appearance





- ENT Exam


ENT Exam: Mucous Membranes Moist





- Respiratory Exam


Respiratory Exam: Clear to PA & Lateral.  absent: Rales, Rhonchi, Wheezes





- Cardiovascular Exam


Cardiovascular Exam: REGULAR RHYTHM, RRR, +S1, +S2





- GI/Abdominal Exam


GI & Abdominal Exam: Normal Bowel Sounds, Soft.  absent: Tenderness





- Extremities Exam


Additional comments: 


RT knee: Moderate erythema and swelling at the anterior right knee around the 

incision area with minimal drainage. +Petechial rash upto several cm distal to 

right knee.  No calf tenderness. Neurovascular intact.





- Neurological Exam


Neurological exam: Alert, Oriented x3





- Psychiatric Exam


Psychiatric exam: Normal Affect, Normal Mood





Discharge Plan





- Follow Up Plan


Condition: FAIR


Disposition: HOME/ ROUTINE


Instructions:  Knee Pain (DC)


Additional Instructions: 


Please follow up with Dr. Galvan on 9/10/18 or 9/12/18.


Referrals: 


Dwayne Galvan MD [Staff Provider] -

## 2018-09-07 NOTE — CP.PCM.PN
Subjective





- Date & Time of Evaluation


Date of Evaluation: 09/07/18


Time of Evaluation: 08:50





- Subjective


Subjective: 





Patient seen and examined this morning.


Reports some pain in right knee and pain in controlled with pain meds.


Reports itching around the wound area.


Had right knee aspiration this morning, pending results.


Denies chest pain, dyspnea, calf pain, fever, chills, nausea, vomiting or night 

sweats.








Objective





- Vital Signs/Intake and Output


Vital Signs (last 24 hours): 


 











Temp Pulse Resp BP Pulse Ox


 


 97.6 F   68   18   111/77   95 


 


 09/07/18 09:33  09/07/18 09:33  09/07/18 09:33  09/07/18 09:33  09/07/18 09:33











- Medications


Medications: 


 Current Medications





Acetaminophen (Tylenol 325mg Tab)  650 mg PO Q6 PRN


   PRN Reason: Pain, moderate (4-7)


Albuterol (Ventolin Hfa 90 Mcg/Actuation (8 G))  2 puff INH RQ6 PRN


   PRN Reason: Shortness of Breath


Amphetamine/Dextroamphetamine (Adderall)  5 mg PO DAILY PRN


   PRN Reason: ADHD


Aripiprazole (Abilify)  5 mg PO DAILY [facility]


   Last Admin: 09/07/18 08:50 Dose:  5 mg


Clonazepam (Klonopin)  0.5 mg PO QID PRN


   PRN Reason: Anxiety


Diphenhydramine HCl (Benadryl)  25 mg PO ONCE ONE


   Stop: 09/07/18 11:36


Sodium Chloride (Sodium Chloride 0.9%)  1,000 mls @ 100 mls/hr IV .Q10H [facility]


   Last Admin: 09/07/18 08:52 Dose:  100 mls/hr


Cefazolin Sodium 1 gm/ Sodium (Chloride)  100 mls @ 100 mls/hr IVPB Q8 [facility]


   PRN Reason: Protocol


   Last Admin: 09/07/18 08:47 Dose:  100 mls/hr


Insulin Human Lispro (Humalog)  0 units SC ACHS [facility]


   PRN Reason: Protocol


   Last Admin: 09/07/18 11:03 Dose:  Not Given


Ketorolac Tromethamine (Toradol)  30 mg IVP Q6 PRN


   PRN Reason: Pain, severe (8-10)


   Last Admin: 09/07/18 09:34 Dose:  30 mg


Levothyroxine Sodium (Synthroid)  100 mcg PO QAM@0630 [facility]


   Last Admin: 09/07/18 05:54 Dose:  100 mcg


Liothyronine Sodium (Cytomel)  25 mcg PO QAM@0630 [facility]


   Last Admin: 09/07/18 05:55 Dose:  25 mcg


Prazosin HCl (Minipress)  4 mg PO [facility]


Sertraline HCl (Zoloft)  50 mg PO DAILY [facility]


   Last Admin: 09/07/18 08:51 Dose:  50 mg


Trazodone HCl (Desyrel)  200 mg PO [facility]











- Labs


Labs: 


 





 09/06/18 21:47 





 09/06/18 21:47 





 











PT  12.0 Seconds (9.8-13.1)   09/07/18  06:10    


 


INR  1.1   09/07/18  06:10    


 


APTT  31.5 Seconds (25.6-37.1)   09/07/18  06:10    














- Constitutional


Appears: Non-toxic, No Acute Distress, Other (morbidly obese)





- Head Exam


Head Exam: NORMAL INSPECTION





- Eye Exam


Eye Exam: EOMI, Normal appearance





- ENT Exam


ENT Exam: Mucous Membranes Moist





- Respiratory Exam


Respiratory Exam: Clear to Ausculation Bilateral, NORMAL BREATHING PATTERN.  

absent: Rales, Rhonchi, Wheezes





- Cardiovascular Exam


Cardiovascular Exam: REGULAR RHYTHM, RRR, +S1, +S2





- GI/Abdominal Exam


GI & Abdominal Exam: Soft, Normal Bowel Sounds.  absent: Tenderness





- Extremities Exam


Extremities Exam: absent: Calf Tenderness


Additional comments: 





RT knee: Moderate erythema and swelling at the anterior right knee around the 

incision area. +discharge noted on steri strips. Petechial rash on right lower 

extremity. No calf tenderness. Neurovascular intact.





- Neurological Exam


Neurological Exam: Alert, Awake, Oriented x3





Assessment and Plan





- Assessment and Plan (Free Text)


Assessment: 





Assessment: 


57 yr old female admitted for cellulitis of RLE with PMHx including NIDDM Type 2

, hypothyroidism, hysterectomy, bipolar disorder/anxiety/ADHD.





Right knee infected surgical wound with cellulitis


-acute


-c/w Ancef IV Q8


-Orthopedic consult appreciated: Dr. Galvan


-NPO possible OR based on knee aspiration fluid analysis results


-f/u blood and urine culture





NIDDM Type 2


chronic, controlled


-continue home medications (Victoza 1.8 PO QAM)


-Lispro SSI based on accucheck


-held Metformin 500mg PO QD





Hypothyroidism


-chronic, controlled


-continue home medication (Levothyroxine 100 mcg PO QD, Cytomel 25mcg PO QD)





Bipolar disorder/Depression/ADHD:


-chonic, controlled 


-continue home medications (Abilify 5mg PO QAM, Zoloft 50mg PO QAM, Klonopin 

1.5mg PO QPM, Prazosin 4mg PO QPM, Klonopin 1mg PRN in 0.5mg doses; Trazodone 

100mg QPM) Adderall PRN)





DVT prophylaxis: Start Lovenox post surgery.

## 2023-02-03 ENCOUNTER — OFFICE VISIT (OUTPATIENT)
Dept: INTERNAL MEDICINE CLINIC | Facility: CLINIC | Age: 63
End: 2023-02-03

## 2023-02-03 VITALS
DIASTOLIC BLOOD PRESSURE: 78 MMHG | RESPIRATION RATE: 18 BRPM | OXYGEN SATURATION: 97 % | TEMPERATURE: 97.7 F | WEIGHT: 231 LBS | HEART RATE: 75 BPM | SYSTOLIC BLOOD PRESSURE: 102 MMHG

## 2023-02-03 DIAGNOSIS — E03.9 HYPOTHYROIDISM, UNSPECIFIED TYPE: ICD-10-CM

## 2023-02-03 DIAGNOSIS — Z00.00 ANNUAL PHYSICAL EXAM: Primary | ICD-10-CM

## 2023-02-03 DIAGNOSIS — Z79.899 LITHIUM LEVEL CHECKED EVERY SIX MONTHS: ICD-10-CM

## 2023-02-03 DIAGNOSIS — Z11.59 NEED FOR HEPATITIS C SCREENING TEST: ICD-10-CM

## 2023-02-03 DIAGNOSIS — E11.9 TYPE 2 DIABETES MELLITUS WITHOUT COMPLICATION, WITHOUT LONG-TERM CURRENT USE OF INSULIN (HCC): ICD-10-CM

## 2023-02-03 RX ORDER — ERGOCALCIFEROL 1.25 MG/1
CAPSULE ORAL
COMMUNITY
Start: 2023-01-08

## 2023-02-03 RX ORDER — LIOTHYRONINE SODIUM 25 UG/1
TABLET ORAL
COMMUNITY
Start: 2023-01-23

## 2023-02-03 RX ORDER — LITHIUM CARBONATE 300 MG
300 TABLET ORAL
COMMUNITY

## 2023-02-03 RX ORDER — GABAPENTIN 100 MG/1
100 CAPSULE ORAL 3 TIMES DAILY
COMMUNITY

## 2023-02-03 RX ORDER — TRAZODONE HYDROCHLORIDE 100 MG/1
100 TABLET ORAL
COMMUNITY

## 2023-02-03 RX ORDER — SEMAGLUTIDE 1.34 MG/ML
INJECTION, SOLUTION SUBCUTANEOUS
COMMUNITY
Start: 2023-01-21

## 2023-02-03 RX ORDER — PRAZOSIN HYDROCHLORIDE 2 MG/1
CAPSULE ORAL
COMMUNITY
Start: 2022-11-01

## 2023-02-03 RX ORDER — DEXTROAMPHETAMINE SACCHARATE, AMPHETAMINE ASPARTATE MONOHYDRATE, DEXTROAMPHETAMINE SULFATE AND AMPHETAMINE SULFATE 2.5; 2.5; 2.5; 2.5 MG/1; MG/1; MG/1; MG/1
10 CAPSULE, EXTENDED RELEASE ORAL
COMMUNITY

## 2023-02-03 RX ORDER — TRAZODONE HYDROCHLORIDE 50 MG/1
TABLET ORAL
COMMUNITY

## 2023-02-03 RX ORDER — LEVOTHYROXINE SODIUM 0.1 MG/1
TABLET ORAL
COMMUNITY
Start: 2023-01-23 | End: 2023-02-16 | Stop reason: SDUPTHER

## 2023-02-03 RX ORDER — CLONAZEPAM 0.12 MG/1
0.12 TABLET, ORALLY DISINTEGRATING ORAL 2 TIMES DAILY PRN
COMMUNITY

## 2023-02-03 NOTE — PATIENT INSTRUCTIONS

## 2023-02-03 NOTE — PROGRESS NOTES
ADULT ANNUAL PHYSICAL   [address]    NAME: Jo Paniagua  AGE: 60 y o  SEX: female  : 1960     DATE: 2/3/2023     Assessment and Plan:     Problem List Items Addressed This Visit    None  Visit Diagnoses     Annual physical exam    -  Primary    Relevant Orders    Comprehensive metabolic panel    Lipid Panel with Direct LDL reflex    CBC and Platelet    Type 2 diabetes mellitus without complication, without long-term current use of insulin (HCC)   - current on GLP1 agonist only  Will obtain UTD A1C  Relevant Medications    Ozempic, 0 25 or 0 5 MG/DOSE, 2 MG/1 5ML injection pen    Other Relevant Orders    HEMOGLOBIN A1C W/ EAG ESTIMATION    Hypothyroidism, unspecified type- currently on therapies noted below  last tsh was over 6 months ago  Will obtain UTD TSH and titrate accordingly  Relevant Medications    levothyroxine 88 mcg tablet    liothyronine (CYTOMEL) 25 mcg tablet    Other Relevant Orders    TSH, 3rd generation with Free T4 reflex    Lithium level checked every six months   - follows with psych  Requesting lithum level  Relevant Orders    Lithium level    Need for hepatitis C screening test        Relevant Orders    Hepatitis C Antibody ([facility])          Immunizations and preventive care screenings were discussed with patient today  Appropriate education was printed on patient's after visit summary  Counseling:  Dental Health: discussed importance of regular tooth brushing, flossing, and dental visits  · Exercise: the importance of regular exercise/physical activity was discussed  Recommend exercise 3-5 times per week for at least 30 minutes  Return in about 6 months (around 8/3/2023) for Next scheduled follow up       Chief Complaint:     Chief Complaint   Patient presents with   • Establish Care     Pt states that she is here to start care also has some concerns would like to discuss with provider      History of Present Illness:     Adult Annual Physical   Patient here for a comprehensive physical exam    reports hx of floppy heart valve, hypothyroidism, and dm2  Follows ith endocrine and psychiatrc provider in [address] [place] [address] psychiatric hospitlization was 2017   Has only one kidney (left)     Diet and Physical Activity  · Diet/Nutrition: ketogenic diet   · Exercise: walking  Depression Screening  PHQ-2/9 Depression Screening    Little interest or pleasure in doing things: 0 - not at all  Feeling down, depressed, or hopeless: 0 - not at all  PHQ-2 Score: 0  PHQ-2 Interpretation: Negative depression screen       General Health  · Sleep: sleeps well  · Hearing: normal - bilateral   · Vision: wears glasses  · Dental: no dental visits for >1 year  /GYN Health  · Patient is: postmenopausal; s/p hysterectomy   · Last mammo- last June  · Last colonoscopy- 4 years ago; recall 5 years  Review of Systems:     Review of Systems   Constitutional: Negative for fever  Respiratory: Negative for shortness of breath  Cardiovascular: Negative for chest pain  Gastrointestinal: Negative for constipation and diarrhea  Musculoskeletal: Negative for gait problem  Neurological: Negative for headaches  Past Medical History:     Past Medical History:   Diagnosis Date   • [facility]   • Depression       Past Surgical History:     Past Surgical History:   Procedure Laterality Date   • HYSTERECTOMY  2007   • KIDNEY SURGERY Right 2009   • SINUS SURGERY  1987      Social History:     Social History     Socioeconomic History   • Marital status: Single     Spouse name: None   • Number of children: None   • Years of education: None   • Highest education level: None   Occupational History   • None   Tobacco Use   • Smoking status: Never   • Smokeless tobacco: Never   Vaping Use   • Vaping Use: Never used   Substance and Sexual Activity   • Alcohol use:  Yes     Alcohol/week: 1 0 standard drink     Types: 1 Glasses of wine per week     Comment: social 1 cup a day wine   • Drug use: Never   • Sexual activity: None   Other Topics Concern   • None   Social History Narrative   • None     Social Determinants of Health     Financial Resource Strain: Not on file   Food Insecurity: Not on file   Transportation Needs: Not on file   Physical Activity: Not on file   Stress: Not on file   Social Connections: Not on file   Intimate Partner Violence: Not on file   Housing Stability: Not on file      Family History:     Family History   Problem Relation Age of Onset   • Heart disease Mother    • Colon cancer Father    • Neuropathy Father    • Heart attack Brother       Current Medications:     Current Outpatient Medications   Medication Sig Dispense Refill   • amphetamine-dextroamphetamine (ADDERALL XR) 10 MG 24 hr capsule Take 10 mg by mouth     • clonazePAM (KlonoPIN) 0 125 mg disintegrating tablet Apply 0 125 mg to cheek 2 (two) times a day as needed     • ergocalciferol (VITAMIN D2) 50,000 units      • gabapentin (NEURONTIN) 100 mg capsule Take 100 mg by mouth Three times a day     • levothyroxine 100 mcg tablet      • liothyronine (CYTOMEL) 25 mcg tablet      • lithium 300 MG tablet Take 300 mg by mouth     • Ozempic, 0 25 or 0 5 MG/DOSE, 2 MG/1 5ML injection pen      • prazosin (MINIPRESS) 2 mg capsule      • traZODone (DESYREL) 100 mg tablet Take 100 mg by mouth     • traZODone (DESYREL) 50 mg tablet Take by mouth       No current facility-administered medications for this visit  Allergies:      Allergies   Allergen Reactions   • Iv Contrast [Iodinated Contrast Media] Vomiting     Vomiting blood   • Lamictal [Lamotrigine] Hives     rash   • Latuda [Lurasidone] Hives     rash   • Levaquin [Levofloxacin] Hives     rash      Physical Exam:     /78 (BP Location: Left arm, Patient Position: Sitting, Cuff Size: Large)   Pulse 75   Temp 97 7 °F (36 5 °C) (Temporal)   Resp 18   Wt 105 kg (231 lb)   SpO2 97%     Physical Exam  Vitals and nursing note reviewed  Constitutional:       General: She is not in acute distress  Appearance: She is well-developed  HENT:      Head: Normocephalic and atraumatic  Right Ear: External ear normal       Left Ear: External ear normal       Mouth/Throat:      Mouth: Mucous membranes are moist       Pharynx: Oropharynx is clear  Eyes:      Conjunctiva/sclera: Conjunctivae normal       Pupils: Pupils are equal, round, and reactive to light  Cardiovascular:      Rate and Rhythm: Normal rate and regular rhythm  Heart sounds: No murmur heard  Pulmonary:      Effort: Pulmonary effort is normal  No respiratory distress  Breath sounds: Normal breath sounds  Abdominal:      Palpations: Abdomen is soft  Tenderness: There is no abdominal tenderness  Musculoskeletal:         General: No swelling  Skin:     General: Skin is warm and dry  Capillary Refill: Capillary refill takes less than 2 seconds  Neurological:      Mental Status: She is alert and oriented to person, place, and time     Psychiatric:         Mood and Affect: Mood normal           Justo Serna DO  [facility]

## 2023-02-10 LAB
ALBUMIN SERPL-MCNC: 4.3 G/DL (ref 3.8–4.8)
ALBUMIN/GLOB SERPL: 1.5 {RATIO} (ref 1.2–2.2)
ALP SERPL-CCNC: 60 IU/L (ref 44–121)
ALT SERPL-CCNC: 22 IU/L (ref 0–32)
AST SERPL-CCNC: 20 IU/L (ref 0–40)
BILIRUB SERPL-MCNC: 0.7 MG/DL (ref 0–1.2)
BUN SERPL-MCNC: 11 MG/DL (ref 8–27)
BUN/CREAT SERPL: 12 (ref 12–28)
CALCIUM SERPL-MCNC: 9.4 MG/DL (ref 8.7–10.3)
CHLORIDE SERPL-SCNC: 104 MMOL/L (ref 96–106)
CHOLEST SERPL-MCNC: 134 MG/DL (ref 100–199)
CO2 SERPL-SCNC: 25 MMOL/L (ref 20–29)
CREAT SERPL-MCNC: 0.89 MG/DL (ref 0.57–1)
EGFR: 73 ML/MIN/1.73
ERYTHROCYTE [DISTWIDTH] IN BLOOD BY AUTOMATED COUNT: 12.6 % (ref 11.7–15.4)
EST. AVERAGE GLUCOSE BLD GHB EST-MCNC: 131 MG/DL
GLOBULIN SER-MCNC: 2.9 G/DL (ref 1.5–4.5)
GLUCOSE SERPL-MCNC: 138 MG/DL (ref 70–99)
HBA1C MFR BLD: 6.2 % (ref 4.8–5.6)
HCT VFR BLD AUTO: 43.9 % (ref 34–46.6)
HCV AB S/CO SERPL IA: 0.2 S/CO RATIO (ref 0–0.9)
HDLC SERPL-MCNC: 40 MG/DL
HGB BLD-MCNC: 14.9 G/DL (ref 11.1–15.9)
LDLC SERPL CALC-MCNC: 78 MG/DL (ref 0–99)
LDLC/HDLC SERPL: 2 RATIO (ref 0–3.2)
LITHIUM SERPL-SCNC: 0.7 MMOL/L (ref 0.5–1.2)
MCH RBC QN AUTO: 31.1 PG (ref 26.6–33)
MCHC RBC AUTO-ENTMCNC: 33.9 G/DL (ref 31.5–35.7)
MCV RBC AUTO: 92 FL (ref 79–97)
PLATELET # BLD AUTO: 209 X10E3/UL (ref 150–450)
POTASSIUM SERPL-SCNC: 4.3 MMOL/L (ref 3.5–5.2)
PROT SERPL-MCNC: 7.2 G/DL (ref 6–8.5)
RBC # BLD AUTO: 4.79 X10E6/UL (ref 3.77–5.28)
SL AMB VLDL CHOLESTEROL CALC: 16 MG/DL (ref 5–40)
SODIUM SERPL-SCNC: 140 MMOL/L (ref 134–144)
TRIGL SERPL-MCNC: 84 MG/DL (ref 0–149)
TSH SERPL DL<=0.005 MIU/L-ACNC: 0.27 UIU/ML (ref 0.45–4.5)
WBC # BLD AUTO: 8 X10E3/UL (ref 3.4–10.8)

## 2023-02-16 ENCOUNTER — TELEPHONE (OUTPATIENT)
Dept: INTERNAL MEDICINE CLINIC | Facility: CLINIC | Age: 63
End: 2023-02-16

## 2023-02-16 DIAGNOSIS — E03.9 HYPOTHYROIDISM, UNSPECIFIED TYPE: Primary | ICD-10-CM

## 2023-02-16 RX ORDER — LEVOTHYROXINE SODIUM 88 UG/1
88 TABLET ORAL DAILY
Qty: 90 TABLET | Refills: 0 | Status: SHIPPED | OUTPATIENT
Start: 2023-02-16 | End: 2023-02-22 | Stop reason: SDUPTHER

## 2023-02-16 NOTE — TELEPHONE ENCOUNTER
----- Message from Lucho Sage DO sent at 2/16/2023  9:34 AM EST -----  A1c is 6 2  this is within goal range  Continue ozempic weekly  Thyroid level is too low  Recommend decreasing the levothyroxine to 88mcg daily  I have sent a new prescription  Need to have your TSH level rechecked in 6 weeks  Otherwise rest of the blood work was good  Lithium level is within range

## 2023-02-22 ENCOUNTER — TELEPHONE (OUTPATIENT)
Dept: INTERNAL MEDICINE CLINIC | Facility: CLINIC | Age: 63
End: 2023-02-22

## 2023-02-22 DIAGNOSIS — E03.9 HYPOTHYROIDISM, UNSPECIFIED TYPE: ICD-10-CM

## 2023-02-22 RX ORDER — LEVOTHYROXINE SODIUM 0.1 MG/1
100 TABLET ORAL DAILY
Qty: 90 TABLET | Refills: 1 | Status: SHIPPED | OUTPATIENT
Start: 2023-02-22 | End: 2023-05-23

## 2023-02-22 NOTE — TELEPHONE ENCOUNTER
I didn't know she was managed by endocrinology  I will send levothyroxine 100mcg  All additional refills must be sent by endocrine

## 2023-02-22 NOTE — TELEPHONE ENCOUNTER
She should have received a call concerning the thyroid level  Thyroid level is too low  Current dose is too high thereforte levothyroxine was decreased to 88mcg daily

## 2023-02-22 NOTE — TELEPHONE ENCOUNTER
Called pt, and was notified and stated she has endocrinologist for many years who does her thyroid dosage his name is Dr Sofia Mccracken stated he like s her level low as she has tendency to develop nodules and he wants her to keep thyroid med at previous dose and wants her to cut liothyronine in half

## 2023-03-24 ENCOUNTER — HOSPITAL ENCOUNTER (EMERGENCY)
Facility: HOSPITAL | Age: 63
Discharge: HOME | End: 2023-03-24
Attending: EMERGENCY MEDICINE

## 2023-03-24 ENCOUNTER — APPOINTMENT (EMERGENCY)
Dept: CT IMAGING | Facility: HOSPITAL | Age: 63
End: 2023-03-24

## 2023-03-24 VITALS
TEMPERATURE: 97.1 F | SYSTOLIC BLOOD PRESSURE: 106 MMHG | WEIGHT: 231.48 LBS | RESPIRATION RATE: 18 BRPM | HEART RATE: 55 BPM | DIASTOLIC BLOOD PRESSURE: 58 MMHG | OXYGEN SATURATION: 97 %

## 2023-03-24 DIAGNOSIS — T50.905A ADVERSE EFFECT OF DRUG, INITIAL ENCOUNTER: Primary | ICD-10-CM

## 2023-03-24 LAB
ALBUMIN SERPL BCP-MCNC: 4.1 G/DL (ref 3.5–5)
ALP SERPL-CCNC: 51 U/L (ref 34–104)
ALT SERPL W P-5'-P-CCNC: 11 U/L (ref 7–52)
ANION GAP SERPL CALCULATED.3IONS-SCNC: 7 MMOL/L (ref 4–13)
AST SERPL W P-5'-P-CCNC: 10 U/L (ref 13–39)
ATRIAL RATE: 57 BPM
BASOPHILS # BLD AUTO: 0.06 THOUSANDS/ÂΜL (ref 0–0.1)
BASOPHILS NFR BLD AUTO: 1 % (ref 0–1)
BILIRUB SERPL-MCNC: 0.73 MG/DL (ref 0.2–1)
BUN SERPL-MCNC: 15 MG/DL (ref 5–25)
CALCIUM SERPL-MCNC: 9.4 MG/DL (ref 8.4–10.2)
CARDIAC TROPONIN I PNL SERPL HS: <2 NG/L
CARDIAC TROPONIN I PNL SERPL HS: <2 NG/L
CHLORIDE SERPL-SCNC: 107 MMOL/L (ref 96–108)
CO2 SERPL-SCNC: 24 MMOL/L (ref 21–32)
CREAT SERPL-MCNC: 0.73 MG/DL (ref 0.6–1.3)
EOSINOPHIL # BLD AUTO: 0.33 THOUSAND/ÂΜL (ref 0–0.61)
EOSINOPHIL NFR BLD AUTO: 4 % (ref 0–6)
ERYTHROCYTE [DISTWIDTH] IN BLOOD BY AUTOMATED COUNT: 13.5 % (ref 11.6–15.1)
FLUAV RNA RESP QL NAA+PROBE: NEGATIVE
FLUBV RNA RESP QL NAA+PROBE: NEGATIVE
GFR SERPL CREATININE-BSD FRML MDRD: 87 ML/MIN/1.73SQ M
GLUCOSE SERPL-MCNC: 129 MG/DL (ref 65–140)
GLUCOSE SERPL-MCNC: 68 MG/DL (ref 65–140)
HCT VFR BLD AUTO: 44.6 % (ref 34.8–46.1)
HGB BLD-MCNC: 14.8 G/DL (ref 11.5–15.4)
IMM GRANULOCYTES # BLD AUTO: 0.01 THOUSAND/UL (ref 0–0.2)
IMM GRANULOCYTES NFR BLD AUTO: 0 % (ref 0–2)
LITHIUM SERPL-SCNC: 0.5 MMOL/L (ref 0.6–1.2)
LYMPHOCYTES # BLD AUTO: 2.21 THOUSANDS/ÂΜL (ref 0.6–4.47)
LYMPHOCYTES NFR BLD AUTO: 28 % (ref 14–44)
MCH RBC QN AUTO: 31 PG (ref 26.8–34.3)
MCHC RBC AUTO-ENTMCNC: 33.2 G/DL (ref 31.4–37.4)
MCV RBC AUTO: 93 FL (ref 82–98)
MONOCYTES # BLD AUTO: 0.41 THOUSAND/ÂΜL (ref 0.17–1.22)
MONOCYTES NFR BLD AUTO: 5 % (ref 4–12)
NEUTROPHILS # BLD AUTO: 4.97 THOUSANDS/ÂΜL (ref 1.85–7.62)
NEUTS SEG NFR BLD AUTO: 62 % (ref 43–75)
NRBC BLD AUTO-RTO: 0 /100 WBCS
P AXIS: 20 DEGREES
PLATELET # BLD AUTO: 222 THOUSANDS/UL (ref 149–390)
PMV BLD AUTO: 11 FL (ref 8.9–12.7)
POTASSIUM SERPL-SCNC: 3.5 MMOL/L (ref 3.5–5.3)
PR INTERVAL: 216 MS
PROT SERPL-MCNC: 7.1 G/DL (ref 6.4–8.4)
QRS AXIS: 12 DEGREES
QRSD INTERVAL: 84 MS
QT INTERVAL: 430 MS
QTC INTERVAL: 418 MS
RBC # BLD AUTO: 4.78 MILLION/UL (ref 3.81–5.12)
RSV RNA RESP QL NAA+PROBE: NEGATIVE
SARS-COV-2 RNA RESP QL NAA+PROBE: NEGATIVE
SODIUM SERPL-SCNC: 138 MMOL/L (ref 135–147)
T WAVE AXIS: 36 DEGREES
TSH SERPL DL<=0.05 MIU/L-ACNC: 0.67 UIU/ML (ref 0.45–4.5)
VENTRICULAR RATE: 57 BPM
WBC # BLD AUTO: 7.99 THOUSAND/UL (ref 4.31–10.16)

## 2023-03-24 RX ORDER — ONDANSETRON 2 MG/ML
4 INJECTION INTRAMUSCULAR; INTRAVENOUS ONCE
Status: COMPLETED | OUTPATIENT
Start: 2023-03-24 | End: 2023-03-24

## 2023-03-24 RX ORDER — ACETAMINOPHEN 325 MG/1
975 TABLET ORAL ONCE
Status: COMPLETED | OUTPATIENT
Start: 2023-03-24 | End: 2023-03-24

## 2023-03-24 RX ORDER — KETOROLAC TROMETHAMINE 30 MG/ML
15 INJECTION, SOLUTION INTRAMUSCULAR; INTRAVENOUS ONCE
Status: COMPLETED | OUTPATIENT
Start: 2023-03-24 | End: 2023-03-24

## 2023-03-24 RX ADMIN — IOHEXOL 85 ML: 350 INJECTION, SOLUTION INTRAVENOUS at 16:14

## 2023-03-24 RX ADMIN — ACETAMINOPHEN 975 MG: 325 TABLET ORAL at 13:45

## 2023-03-24 RX ADMIN — KETOROLAC TROMETHAMINE 15 MG: 30 INJECTION, SOLUTION INTRAMUSCULAR; INTRAVENOUS at 13:47

## 2023-03-24 RX ADMIN — SODIUM CHLORIDE 1000 ML: 0.9 INJECTION, SOLUTION INTRAVENOUS at 13:35

## 2023-03-24 RX ADMIN — ONDANSETRON 4 MG: 2 INJECTION INTRAMUSCULAR; INTRAVENOUS at 16:08

## 2023-03-24 NOTE — ED PROVIDER NOTES
History  Chief Complaint   Patient presents with   • Leg Pain     C/o b/l leg pain, states she is off her psych meds and " I am hoping for a psych consult" denies SI/HI  • Psychiatric Evaluation     Patient is a 58-year-old female past medical history of bipolar disorder, Beatties, IBS, leg pain, weakness, clumsiness  Patient states that for the last 10 days she has had waxing and waning levels of weakness, clumsiness, lightheadedness  States that she feels this is a reaction to her medications and states that it started after being taken off Vraylar 1 5 weeks ago and put on Caplyta which she was on for 3 days before being taken off that  States that the clumsiness and feeling of being off balance is worse in the morning and in the late evening but better in the midday  She denies any unilateral numbness or weakness, vision changes or vertigo  Denies any chest pain, shortness of breath, fevers, nausea or vomiting, rashes, vision changes  Does note cough and sore throat and states sore throat has resolved  Denies any unilateral numbness or weakness  She notes bilateral leg pain which she described as a "creepy crawly sensation "since that time but denies any falls or head injuries or back pain  Took a Tylenol and a Klonopin last night and slept for the longest that she has slept since this started note severe insomnia only 2 to 3 hours a night since this began  Denies leg swelling  Prior to Admission Medications   Prescriptions Last Dose Informant Patient Reported? Taking?    Ozempic, 0 25 or 0 5 MG/DOSE, 2 MG/1 5ML injection pen   Yes No   amphetamine-dextroamphetamine (ADDERALL XR) 10 MG 24 hr capsule   Yes No   Sig: Take 10 mg by mouth   clonazePAM (KlonoPIN) 0 125 mg disintegrating tablet   Yes No   Sig: Apply 0 125 mg to cheek 2 (two) times a day as needed   ergocalciferol (VITAMIN D2) 50,000 units   Yes No   gabapentin (NEURONTIN) 100 mg capsule   Yes No   Sig: Take 100 mg by mouth Three times a day   levothyroxine 100 mcg tablet   No No   Sig: Take 1 tablet (100 mcg total) by mouth daily   liothyronine (CYTOMEL) 25 mcg tablet   Yes No   lithium 300 MG tablet   Yes No   Sig: Take 300 mg by mouth   prazosin (MINIPRESS) 2 mg capsule   Yes No   traZODone (DESYREL) 100 mg tablet   Yes No   Sig: Take 100 mg by mouth   traZODone (DESYREL) 50 mg tablet   Yes No   Sig: Take by mouth      Facility-Administered Medications: None       Past Medical History:   Diagnosis Date   • Bipolar 2 disorder ([facility] [address] ) 1990   • Depression        Past Surgical History:   Procedure Laterality Date   • HYSTERECTOMY  2007   • KIDNEY SURGERY Right 2009   • SINUS SURGERY  1987       Family History   Problem Relation Age of Onset   • Heart disease Mother    • Colon cancer Father    • Neuropathy Father    • Heart attack Brother      I have reviewed and agree with the history as documented  E-Cigarette/Vaping   • E-Cigarette Use Never User      E-Cigarette/Vaping Substances   • Nicotine No      Social History     Tobacco Use   • Smoking status: Never   • Smokeless tobacco: Never   Vaping Use   • Vaping Use: Never used   Substance Use Topics   • Alcohol use: Yes     Alcohol/week: 1 0 standard drink     Types: 1 Glasses of wine per week     Comment: social 1 cup a day wine   • Drug use: Never       Review of Systems   All other systems reviewed and are negative  Physical Exam  Physical Exam  Vitals reviewed  Constitutional:       General: She is not in acute distress  Appearance: Normal appearance  She is not ill-appearing  HENT:      Head: Normocephalic and atraumatic  Mouth/Throat:      Mouth: Mucous membranes are moist    Eyes:      Extraocular Movements: Extraocular movements intact  Conjunctiva/sclera: Conjunctivae normal       Pupils: Pupils are equal, round, and reactive to light  Cardiovascular:      Rate and Rhythm: Normal rate and regular rhythm  Heart sounds: Normal heart sounds     Pulmonary: Effort: Pulmonary effort is normal       Breath sounds: Normal breath sounds  Abdominal:      General: Abdomen is flat  Palpations: Abdomen is soft  Tenderness: There is no abdominal tenderness  Musculoskeletal:         General: No swelling  Normal range of motion  Cervical back: Neck supple  Skin:     General: Skin is warm and dry  Neurological:      General: No focal deficit present  Mental Status: She is alert  Cranial Nerves: No cranial nerve deficit  Sensory: No sensory deficit  Motor: No weakness        Coordination: Coordination normal       Comments: Hesitant slow-moving gait, intermittently appearing mildly unsteady but not ataxic   Psychiatric:         Mood and Affect: Mood normal          Vital Signs  ED Triage Vitals [03/24/23 1234]   Temperature Pulse Respirations Blood Pressure SpO2   (!) 97 1 °F (36 2 °C) 65 18 140/75 97 %      Temp src Heart Rate Source Patient Position - Orthostatic VS BP Location FiO2 (%)   -- Monitor -- -- --      Pain Score       --           Vitals:    03/24/23 1234   BP: 140/75   Pulse: 65         Visual Acuity      ED Medications  Medications   sodium chloride 0 9 % bolus 1,000 mL (has no administration in time range)   ketorolac (TORADOL) injection 15 mg (has no administration in time range)   acetaminophen (TYLENOL) tablet 975 mg (has no administration in time range)       Diagnostic Studies  Results Reviewed     None                 No orders to display              Procedures  ECG 12 Lead Documentation Only    Date/Time: 3/24/2023 2:13 PM  Performed by: Triston Pinto DO  Authorized by: Triston Pinto DO     Patient location:  ED  Previous ECG:     Previous ECG:  Unavailable  Interpretation:     Interpretation: non-specific    Rate:     ECG rate assessment: bradycardic    Rhythm:     Rhythm: sinus rhythm    Ectopy:     Ectopy: none    QRS:     QRS axis:  Normal    QRS intervals:  Normal  Conduction:     Conduction: abnormal      Abnormal conduction: 1st degree    ST segments:     ST segments:  Normal  T waves:     T waves: non-specific               ED Course                                             Medical Decision Making  Patient is a 50-year-old female past medical history of bipolar disorder, IBS, diabetes, depression presenting for leg pain, weakness  Patient is well-appearing at bedside with stable vitals and in no acute distress  She has no gross amount is on neurologic exam but does appear intermittently mildly unsteady with gait and is moving very slowly but does not appear ataxic and has no ataxia on finger-to-nose testing, denying any vertigo  As patient states that this is waxes and wanes in intensity throughout the day, has been 10 days of onset, presents without vertigo or vomiting and associated with medication changes do not feel the patient requires head imaging at this time and have no concern for posterior stroke  Will check labs to rule out electrolyte abnormalities, anemia, troponins to rule out ACS as cause of weakness, EKG to assess for arrhythmias, ischemic changes, obtain telepsychiatry consult as this is likely medication related, give fluids and pain management and reassess  Amount and/or Complexity of Data Reviewed  Labs: ordered  Risk  OTC drugs  Prescription drug management  Disposition  Final diagnoses:   None     ED Disposition     None      Follow-up Information    None         Patient's Medications   Discharge Prescriptions    No medications on file       No discharge procedures on file      PDMP Review       Value Time User    PDMP Reviewed  Yes 2/3/2023  1:52 PM Sona Dailey DO          ED Provider  Electronically Signed by fluids and pain management and reassess  Amount and/or Complexity of Data Reviewed  Labs: ordered  Risk  OTC drugs  Prescription drug management  Disposition  Final diagnoses:   None     ED Disposition     None      Follow-up Information    None         Patient's Medications   Discharge Prescriptions    No medications on file       No discharge procedures on file      PDMP Review       Value Time User    PDMP Reviewed  Yes 2/3/2023  1:52 PM Padma Ramírez DO          ED Provider  Electronically Signed by           Vlad Ruiz DO  03/27/23 4482

## 2023-03-25 NOTE — DISCHARGE INSTRUCTIONS
Please take half the dose of your Klonopin until following up with psychiatry, please discontinue Caplyta until following up with psychiatry

## 2023-03-25 NOTE — ED NOTES
Crisis met with pt to complete Crisis Intake and Safety Risk Assessment  Introduce self, role, and evaluation process  Pt has a history of Bipolar Disorder  She moved from [place] to [place] in October 2022  Pt states she has been taking Lithium,  Trazedone, Kolonipin, and Gabapentin  Pt states her psychiatrist discontinued her Vraylar 3mg  and switched her to  Albert Arapiraca 1943 42mg  Pt states she started medication over the weekend, and  has had increased agitation, "sinking mood", bumping into walls, and off balance " Pt denies SI/HI/AVH or thoughts to self harm  Pt denies substance or alcohol use, Pt states her psychiatrist would like for her to see Neurologist, and have a psychiatric evaluation

## 2023-03-25 NOTE — ED NOTES
Patient rings call bell and notifies staff that personal glucose monitor alarmed with a warning that blood sugar level is 58  Pt denies any symptoms of hypoglycemia at this time  Pts blood sugar checked with ER glucometer and resulted as 68  Pt provided with meal at this time and instructed to report any symptoms        Lauri Angeles RN  03/24/23 2010

## 2023-03-25 NOTE — ED NOTES
Telepsych consult being completed with Dr Elsa Villalpando via ipad at this time        Asif Langston RN  03/24/23 3646

## 2023-03-25 NOTE — TELEMEDICINE
TeleConsultation - [address] 60 y o  female MRN: [id number]  Unit/Bed#: ED 14 Encounter: [id number]        REQUIRED DOCUMENTATION:     1  This service was provided via Telemedicine  2  Provider located at [facility]   3  TeleMed provider: Alivia Dela Cruz MD   4  Identify all parties in room with patient during tele consult:  pt  5  Patient was then informed that this was a Telemedicine visit and that the exam was being conducted confidentially over secure lines  My office door was closed  No one else was in the room  Patient acknowledged consent and understanding of privacy and security of the Telemedicine visit, and gave us permission to have the assistant stay in the room in order to assist with the history and to conduct the exam   I informed the patient that I have reviewed their record in Epic and presented the opportunity for them to ask any questions regarding the visit today  The patient agreed to participate  Assessment/Plan     Active Problems:    * No active hospital problems  *    Assessment:    Bipolar 2 disorder by history; possible oversedation secondary to polypharmacy    Treatment Plan:        Recommend lithium level  The patient does not know the dosing of her outpatient psychiatric medications  Recommend that once confirming the dosing of her Klonopin which she states she takes at bedtime, that the dose be cut in half  Recommend to discontinue the Albert Arapiraca 1943 for now while continuing her gabapentin, trazodone and lithium at current dosing as long as lithium does not return above the therapeutic range  If lithium returns above the therapeutic range, would hold that medication until back in the therapeutic range reducing the daily dose accordingly  Upon medical clearance and discharge, the patient should follow-up with her outpatient psychiatrist as soon as possible for further assessment  No suicide precautions are indicated at this time    Reconsult psychiatry as needed  Current Medications:         Risks / Benefits of Treatment:    Risks, benefits, and possible side effects of medications explained to patient and patient verbalizes understanding  Other treatment modalities ordered as indicated:    · outpatient referral      Consult to Psychiatry  Consult performed by: Nithya Tay MD  Consult ordered by: Tong Huff DO        Physician Requesting Consult: Tong Huff DO  Principal Problem:<principal problem not specified>    Reason for Consult: Medication side effect      History of Present Illness      Patient is a 60 y o  female who presents to the emergency department where crisis obtained to document the following information:  Crisis met with pt to complete Crisis Intake and Safety Risk Assessment  Introduce self, role, and evaluation process  Pt has a history of Bipolar Disorder  She moved from [place] to [place] in October 2022  Pt states she has been taking Lithium,  Trazedone, Kolonipin, and Gabapentin  Pt states her psychiatrist discontinued her Vraylar 3mg  and switched her to  Albert Arapiraca 1943 42mg  Pt states she started medication over the weekend, and  has had increased agitation, "sinking mood", bumping into walls, and off balance " Pt denies SI/HI/AVH or thoughts to self harm  Pt denies substance or alcohol use, Pt states her psychiatrist would like for her to see Neurologist, and have a psychiatric evaluation  The patient reports she poorly tolerated Vraylar  She states Vraylar was discontinued for about 6 days before Albert Arapiraca 1943 was started  She does not recall how she did  During that 6-day period of time  She states she has felt weak in her legs and off balance since starting Caplyta  Past psychiatric history: Bipolar 2 disorder    Social history: The patient is single with no children  She lives alone and reports everything is fine at home  She reports no abuse      Family history: The patient reports that depression ran in both her mother and father  Her great grandmother was institutionalized  Brother is an alcoholic    Substance use history: Unremarkable    Mental status examination: The patient was alert and well oriented in all spheres  She appears very sedated frequently closing her eyes at times worse with slightly slurred  She cannot recall dosing her medications and in general serves as a poor historian  Thought process was otherwise logical and linear  Thought content was reality based  She appears to be of average intelligence by use of vocabulary, general fund of knowledge, sentence structure and syntax  She denies suicidal homicidal ideation  She denies hallucinations and other psychotic features  She reports that mood has been disturbed secondary to feeling "sick" on her current medication  Insight and judgment are intact  Past Medical History:   Diagnosis Date   • Bipolar 2 disorder ([facility] [address] ) 1990   • Depression        Medical Review Of Systems:    Review of Systems    Meds/Allergies     all current active meds have been reviewed  Allergies   Allergen Reactions   • Iv Contrast [Iodinated Contrast Media] Vomiting     Vomiting blood   • Lamictal [Lamotrigine] Hives     rash   • Latuda [Lurasidone] Hives     rash   • Levaquin [Levofloxacin] Hives     rash       Objective     Vital signs in last 24 hours:  Temp:  [97 1 °F (36 2 °C)] 97 1 °F (36 2 °C)  HR:  [60-65] 60  Resp:  [18-20] 18  BP: (112-140)/(64-79) 112/64      Intake/Output Summary (Last 24 hours) at 3/24/2023 2223  Last data filed at 3/24/2023 1619  Gross per 24 hour   Intake 1000 ml   Output --   Net 1000 ml         Lab Results: I have personally reviewed all pertinent laboratory/tests results  Imaging Studies: CTA head and neck with and without contrast    Result Date: 3/24/2023  Narrative: CTA NECK AND BRAIN WITH AND WITHOUT CONTRAST INDICATION: OFF BALANCE COMPARISON:   None   TECHNIQUE:  Routine CT imaging of the Brain without contrast   Post contrast imaging was performed after administration of iodinated contrast through the neck and brain  Post contrast axial 0 625 mm images timed to opacify the arterial system  3D rendering was performed on an independent workstation  MIP reconstructions performed  Coronal reconstructions were performed of the noncontrast portion of the brain  Radiation dose length product (DLP) for this visit:  21  mGy-cm   This examination, like all CT scans performed in the [facility], was performed utilizing techniques to minimize radiation dose exposure, including the use of iterative reconstruction and automated exposure control  IV Contrast:  85 mL of iohexol (OMNIPAQUE)  IMAGE QUALITY:   Diagnostic FINDINGS: NONCONTRAST BRAIN PARENCHYMA:  No intracranial mass, mass effect or midline shift  No CT signs of acute infarction  No acute parenchymal hemorrhage  VENTRICLES AND EXTRA-AXIAL SPACES:  Normal for the patient's age  VISUALIZED ORBITS: Normal visualized orbits  PARANASAL SINUSES: Normal visualized paranasal sinuses  CERVICAL VASCULATURE AORTIC ARCH AND GREAT VESSELS:  Normal aortic arch and great vessel origins  Normal visualized subclavian vessels  RIGHT VERTEBRAL ARTERY CERVICAL SEGMENT:  Normal origin  The vessel is normal in caliber throughout the neck  LEFT VERTEBRAL ARTERY CERVICAL SEGMENT:  Normal origin  The vessel is normal in caliber throughout the neck  RIGHT EXTRACRANIAL CAROTID SEGMENT:  Normal caliber common carotid artery  Normal bifurcation and cervical internal carotid artery  No stenosis or dissection  LEFT EXTRACRANIAL CAROTID SEGMENT:  Normal caliber common carotid artery  Normal bifurcation and cervical internal carotid artery  No stenosis or dissection  NASCET criteria was used to determine the degree of internal carotid artery diameter stenosis   INTRACRANIAL VASCULATURE INTERNAL CAROTID ARTERIES:  Mild calcified plaque in the cavernous and supraclinoid internal carotid arteries without significant stenosis     Normal ophthalmic artery origins  Normal ICA terminus  ANTERIOR CIRCULATION:  Symmetric A1 segments and anterior cerebral arteries with normal enhancement  Normal anterior communicating artery  MIDDLE CEREBRAL ARTERY CIRCULATION:  M1 segment and middle cerebral artery branches demonstrate normal enhancement bilaterally  DISTAL VERTEBRAL ARTERIES:  Normal distal vertebral arteries  Distal right vertebral artery is hypoplastic  Posterior inferior cerebellar artery origins are normal  Normal vertebral basilar junction  BASILAR ARTERY:  Basilar artery is normal in caliber  Normal superior cerebellar arteries  POSTERIOR CEREBRAL ARTERIES: Both posterior cerebral arteries arises from the basilar tip  Both arteries demonstrate normal enhancement  VENOUS STRUCTURES:  Normal  NON VASCULAR ANATOMY BONY STRUCTURES:  No acute osseous abnormality  SOFT TISSUES OF THE NECK: Thyroid nodules measuring up to 1 1 cm on the left  Incidental discovery of one or more thyroid nodule(s) measuring less than 1 5 cm and without suspicious features is noted in this patient who is above 28years old; according to guidelines published in the February 2015 white paper on incidental thyroid nodules in the Journal of the [employer] of Radiology [facility]), no further evaluation is recommended  THORACIC INLET:  Normal      Impression: No acute intracranial pathology  No significant stenosis of the cervical carotid or vertebral arteries  No high-grade intracranial stenosis, large vessel occlusion or aneurysm   Workstation performed: [id number]     EKG/Pathology/Other Studies:   Lab Results   Component Value Date    VENTRATE 57 03/24/2023    ATRIALRATE 57 03/24/2023    PRINT 216 03/24/2023    QRSDINT 84 03/24/2023    QTINT 430 03/24/2023    QTCINT 418 03/24/2023    PAXIS 20 03/24/2023    QRSAXIS 12 03/24/2023    TWAVEAXIS 36 03/24/2023        Code Status: No Order  Advance Directive and Living Will: Power of :    POLST:      Counseling / Coordination of Care: Total floor / unit time spent today 30 minutes  Greater than 50% of total time was spent with the patient and / or family counseling and / or coordination of care  A description of the counseling / coordination of care: Chart review, patient evaluation, coordination communication with staff, nursing and provider

## 2023-03-29 ENCOUNTER — TELEPHONE (OUTPATIENT)
Dept: NEUROLOGY | Facility: CLINIC | Age: 63
End: 2023-03-29

## 2023-03-29 NOTE — TELEPHONE ENCOUNTER
Patient calling to schedule new patient appointment for withdrawal from psych medications and is having dizzy spells and vertigo  Patient seen in ER  Testing done  Triage intake sent

## 2023-04-24 ENCOUNTER — OFFICE VISIT (OUTPATIENT)
Age: 63
End: 2023-04-24

## 2023-04-24 DIAGNOSIS — R42 POSTURAL DIZZINESS: Primary | ICD-10-CM

## 2023-04-24 NOTE — PROGRESS NOTES
Daily Note     Today's date: 2023  Patient name: Bari Figueredo  : 1960  MRN: [id number]  Referring provider: Elvia Howe  Dx:   Encounter Diagnosis     ICD-10-CM    1  Postural dizziness  R42                      Subjective: Pt reports no spinning dizziness but unsteadiness at times       Objective: See treatment diary below  - [place]-hallpike   - R: negative    - L: 18 seconds  - Roll    - R: negative    - L: negative     - Eply for left BPPV conducted 3 times  Decrease in symptoms to none after 3rd attempt  HEP:   Access Code: [email or web address]  URL: [email or web address]  Date: 2023  Prepared by: David Garrison    Exercises  - Standing Gaze Stability (VOR) x 1 with Distant Target with Horizontal Head Turns  - 1 x daily - 7 x weekly - 1 sets - 3 reps  - Walking with Head Rotation  - 1 x daily - 7 x weekly - 1 sets - 3 reps  - Feet Together Balance at [employer] Eyes Closed  - 1 x daily - 7 x weekly - 1 sets - 10 reps - 10 hold  - Tandem Walking with Counter Support  - 1 x daily - 7 x weekly - 1 sets - 3-5 reps  - Standing VOR Cancellation  - 1 x daily - 7 x weekly - 1 sets - 10 reps    Assessment: Pt had +finding for BPPV on left side with upbeating torsional nystagmus lasting 18 seconds, Eply conducted 3 times with negative finding after 3rd attempt  Patient would benefit from continued PT      Plan: Continue per plan of care        POC expires Auth Status   23 Self pay                       Visit/Unit Tracking  Date       Unit 100 80 Self Pay 1 NMR  Self Pay 1 NMR  Needs to Pay

## 2023-04-26 ENCOUNTER — OFFICE VISIT (OUTPATIENT)
Age: 63
End: 2023-04-26

## 2023-04-26 DIAGNOSIS — R42 POSTURAL DIZZINESS: Primary | ICD-10-CM

## 2023-04-26 NOTE — PROGRESS NOTES
Daily Note     Today's date: 2023  Patient name: Marly Coto  : 1960  MRN: [id number]  Referring provider: Last Aguilar  Dx:   Encounter Diagnosis     ICD-10-CM    1  Postural dizziness  R42                      Subjective: Pt reports no spinning dizziness but unsteadiness at times       Objective: See treatment diary below  - [place] [employer]-hallpike   - R: negative    - L: negative   - Roll    - R: negative    - L: negative     - seated LAQ: 3 sec hold 20 reps  - standing 3 way hip with focus of co-contraction of proximal hip musculature   for neuro recruitment   Marches: 3 sec hold 2 sets 10 reps    Abd: 3 sec hold 2 sets 10 reps    Ext: 3 sec hold 2 sets 10 reps     Access Code: [email or web address]  URL: [email or web address]  Date: 2023  Prepared by: Jasmin De La Paz    Exercises  - Standing Gaze Stability (VOR) x 1 with Distant Target with Horizontal Head Turns  - 1 x daily - 7 x weekly - 1 sets - 3 reps  - Walking with Head Rotation  - 1 x daily - 7 x weekly - 1 sets - 3 reps  - Feet Together Balance at [facility] Eyes Closed  - 1 x daily - 7 x weekly - 1 sets - 10 reps - 10 hold  - Tandem Walking with Counter Support  - 1 x daily - 7 x weekly - 1 sets - 3-5 reps  - Standing VOR Cancellation  - 1 x daily - 7 x weekly - 1 sets - 10 reps  - Seated Long Arc Quad  - 1 x daily - 7 x weekly - 2 sets - 10 reps - 3 hold  - Standing March with Counter Support  - 1 x daily - 7 x weekly - 2 sets - 10 reps - 3 hold  - Standing Hip Abduction with Counter Support  - 1 x daily - 7 x weekly - 2 sets - 10 reps - 3 hold  - Standing Hip Extension with Counter Support  - 1 x daily - 7 x weekly - 2 sets - 10 reps - 3 hold    Assessment: negative for positional dizziness this date  Issued knee exercises to help improve function   Hold for 2 weeks with DC after that point       Plan: hold      POC expires Auth Status   23 Self pay                       Visit/Unit Tracking  Date      Unit 100 80 Self Pay 1 NMR  Self Pay 1 NMR  paid 1 NMR paid

## 2023-05-04 ENCOUNTER — APPOINTMENT (OUTPATIENT)
Dept: RADIOLOGY | Facility: CLINIC | Age: 63
End: 2023-05-04

## 2023-05-04 ENCOUNTER — OFFICE VISIT (OUTPATIENT)
Dept: OBGYN CLINIC | Facility: CLINIC | Age: 63
End: 2023-05-04

## 2023-05-04 VITALS
HEART RATE: 74 BPM | BODY MASS INDEX: 36.48 KG/M2 | OXYGEN SATURATION: 98 % | HEIGHT: 66 IN | DIASTOLIC BLOOD PRESSURE: 86 MMHG | WEIGHT: 227 LBS | SYSTOLIC BLOOD PRESSURE: 126 MMHG

## 2023-05-04 DIAGNOSIS — M17.12 PRIMARY OSTEOARTHRITIS OF LEFT KNEE: Primary | ICD-10-CM

## 2023-05-04 DIAGNOSIS — M25.562 LEFT KNEE PAIN, UNSPECIFIED CHRONICITY: ICD-10-CM

## 2023-05-04 RX ORDER — TRIAMCINOLONE ACETONIDE 40 MG/ML
40 INJECTION, SUSPENSION INTRA-ARTICULAR; INTRAMUSCULAR
Status: COMPLETED | OUTPATIENT
Start: 2023-05-04 | End: 2023-05-04

## 2023-05-04 RX ORDER — BUPIVACAINE HYDROCHLORIDE 2.5 MG/ML
4 INJECTION, SOLUTION INFILTRATION; PERINEURAL
Status: COMPLETED | OUTPATIENT
Start: 2023-05-04 | End: 2023-05-04

## 2023-05-04 RX ADMIN — TRIAMCINOLONE ACETONIDE 40 MG: 40 INJECTION, SUSPENSION INTRA-ARTICULAR; INTRAMUSCULAR at 12:22

## 2023-05-04 RX ADMIN — BUPIVACAINE HYDROCHLORIDE 4 ML: 2.5 INJECTION, SOLUTION INFILTRATION; PERINEURAL at 12:22

## 2023-05-04 NOTE — PROGRESS NOTES
Large joint arthrocentesis: L knee  Universal Protocol:  Consent: Verbal consent obtained  Risks and benefits: risks, benefits and alternatives were discussed  Consent given by: patient    Supporting Documentation  Indications: pain   Procedure Details  Location: knee - L knee  Needle size: 22 G  Ultrasound guidance: no  Approach: anterolateral  Medications administered: 4 mL bupivacaine 0 25 %; 40 mg triamcinolone acetonide 40 mg/mL    Patient tolerance: patient tolerated the procedure well with no immediate complications    Risks and benefits of CSI were discussed with patient extensively  Risks were highlighted which included but were not limited to infection, pain, local site swelling, and chance that injection may not be effective  Patient was also counseled regarding glucose elevation days after receiving CSI and to be mindful of diet and check sugars daily  Patient agreeable to proceed with CSI after counseling

## 2023-05-04 NOTE — PROGRESS NOTES
"    Subjective:    Chief Complaint   Patient presents with    Left Knee - Pain       Sherry Larios is a 60 y o  female complains of left knee pain  Onset of the symptoms was several months ago  Mechanism of injury: none  Aggravating factors: going up and down stairs, walking  and weight bearing  Treatment to date: rest  Symptoms have progressed to a point and plateaued  The following portions of the patient's history were reviewed and updated as appropriate: allergies, current medications, past family history, past medical history, past social history, past surgical history and problem list     Occupation:      Review of Systems   Constitutional: Negative for fever  HENT: Negative for dental problem and headaches  Eyes: Negative for vision loss  Respiratory: Negative for cough and shortness of breath  Cardiovascular: Negative for leg swelling and palpitations  Gastrointestinal: Negative for constipation and diarrhea  Genitourinary: Negative for bladder incontinence and difficulty urinating  Musculoskeletal: Negative for back pain and difficulty walking  Skin: Negative for rash and ulcer  Neurological: Negative for dizziness and headaches  Hem/Lymph/Immuno: Negative for blood clots  Does not bruise/bleed easily  Psychiatric/Behavioral: Negative for confusion  Objective:  /86   Pulse 74   Ht 5' 6\" (1 676 m)   Wt 103 kg (227 lb)   SpO2 98%   BMI 36 64 kg/m²   Skin: no rashes, lesions, skin discolorations, lacerations  Vasculature: normal popliteal and pedal pulse, normal skin color, normal capillary refill in extremity, no lower extremity edema  Neurologic: Neurologic exam is normal throughout lower extremities, Awake, alert, and oriented x3, no apparent distress  Musculoskeletal: Left KNEE EXAM  Gait: limping gait negative  Inspection:No erythema, no induration  There is no gross deformity  Palpation: Swelling: negative  Effusion: negative   Medial joint line TTP: " postiive  Lateral joint line TTP: positive  ROM: Full flexion and extension  Special tests: Akshat's: positive, Apley's compression: negative  Instability to varus/valgus stress: negative  Anterior Drawer: negative Lachman's test: negative  Posterior Drawer: negative  Crepitus: negative        Imaging:       Assessment/Plan:  1  Primary osteoarthritis of left knee    > 45 min devoted to review of previous, pertinent medical records, imaging, discussion of treatment options, counseling and documentation  Imaging independently reviewed and discussed with patient  Degenerative changes noted, no acute fractures appreciated  Follow-up official reading  Clinical impression is the patient's left knee pain is secondary to underlying osteoarthritis and likely degenerative meniscus injury     We discussed the nature of knee osteoarthritis at length and detailed the treatment approach  Directed to ice the area daily for 20 minutes at a time using a barrier to protect the skin- stressed specifically icing after activity to address inflammation  Recommending formal PT-declines  We discussed a HEP and literature was provided for reference  Advised to avoid any exercises which exacerbate their pain  CSI performed in office today  Follow up in 8 weeks  Should sx's worsen or any concerns arise, they were advised to follow up sooner or seek more immediate medical attention  All of the patient's concerns were addressed and questions answered  They verbalized agreement with and understanding of the treatment plan  - XR knee 4+ vw left injury;  Future  - Large joint arthrocentesis: L knee

## 2023-05-12 ENCOUNTER — TELEPHONE (OUTPATIENT)
Dept: OBGYN CLINIC | Facility: HOSPITAL | Age: 63
End: 2023-05-12

## 2023-05-12 NOTE — TELEPHONE ENCOUNTER
Caller: Patient    Doctor: Elvis Mckeon    Reason for call: patient calls in stating that she has been doing the exercises at home as recommended by Dr Elvis Mckeon  She is also using the Voltaren  However, her knee pain seems to be getting worse  Please advise  Call back#: [phone]  Patient will be in a meeting from 10:30-11:30 AM today

## 2023-05-12 NOTE — TELEPHONE ENCOUNTER
Called and spoke w/pt and scheduled for Monday per Dr Sifuentes Sample to schedule for re-evaluation  Advised to rest, elevate, ice and may wrap in ace wrap if that helps to give support  She is using Voltaren and taking tylenol arthritis  Will hold off on exercises if aggregating pain

## 2023-05-15 ENCOUNTER — OFFICE VISIT (OUTPATIENT)
Dept: OBGYN CLINIC | Facility: CLINIC | Age: 63
End: 2023-05-15

## 2023-05-15 VITALS
OXYGEN SATURATION: 99 % | DIASTOLIC BLOOD PRESSURE: 73 MMHG | BODY MASS INDEX: 36.32 KG/M2 | RESPIRATION RATE: 18 BRPM | SYSTOLIC BLOOD PRESSURE: 103 MMHG | WEIGHT: 226 LBS | HEART RATE: 60 BPM | HEIGHT: 66 IN

## 2023-05-15 DIAGNOSIS — M17.12 PRIMARY OSTEOARTHRITIS OF LEFT KNEE: Primary | ICD-10-CM

## 2023-05-15 NOTE — PROGRESS NOTES
"    Subjective:    Chief Complaint   Patient presents with   • Left Knee - Swelling, Pain, Follow-up     Buckled and fell increases pain in the back of the knee       Sabas Bliss is a 60 y o  female is presenting for follow-up visit in regards to left knee pain  Patient was seen approximately 2 weeks ago for chronic knee pain symptoms  She was provided with a cortisone injection for the left knee which she states did not provide her knee pain relief  She reports that she has been continuing with buckling episodes and now reports some occasional locking of the knee  Pain is noted in the posterior aspect of the knee  Denies any numbness or tingling  The following portions of the patient's history were reviewed and updated as appropriate: allergies, current medications, past family history, past medical history, past social history, past surgical history and problem list     Occupation:      Review of Systems   Constitutional: Negative for fever  HENT: Negative for dental problem and headaches  Eyes: Negative for vision loss  Respiratory: Negative for cough and shortness of breath  Cardiovascular: Negative for leg swelling and palpitations  Gastrointestinal: Negative for constipation and diarrhea  Genitourinary: Negative for bladder incontinence and difficulty urinating  Musculoskeletal: Negative for back pain and difficulty walking  Skin: Negative for rash and ulcer  Neurological: Negative for dizziness and headaches  Hem/Lymph/Immuno: Negative for blood clots  Does not bruise/bleed easily  Psychiatric/Behavioral: Negative for confusion           Objective:  /73   Pulse 60   Resp 18   Ht 5' 6\" (1 676 m)   Wt 103 kg (226 lb)   SpO2 99%   BMI 36 48 kg/m²   Skin: no rashes, lesions, skin discolorations, lacerations  Vasculature: normal popliteal and pedal pulse, normal skin color, normal capillary refill in extremity, no lower extremity edema  Neurologic: Neurologic exam is " normal throughout lower extremities, Awake, alert, and oriented x3, no apparent distress  Musculoskeletal: Left KNEE EXAM  Gait: limping gait positive  Inspection:No erythema, no induration  There is no gross deformity  Palpation: Swelling: negative  Effusion: negative  Medial joint line TTP: Positive  Lateral joint line TTP: Positive  ROM: Full flexion and extension  Special tests: Akshat's: negative, Apley's compression: negative  Instability to varus/valgus stress: negative  Anterior Drawer: negative Lachman's test: negative  Posterior Drawer: negative  Crepitus: negative        Imaging:       Assessment/Plan:  1  Primary osteoarthritis of left knee  Patient continues to persist with left knee pain symptoms despite home exercise program and cortisone injection  She now is reporting occasional buckling sensation and has had to episodes of subjective knee instability  I am recommending the patient follow-up with an MRI of the left knee for further evaluation as no improvement with cortisone injection and home exercise program and anti-inflammatory with worsening knee pain symptoms  We will follow-up once MRI results return    - MRI knee left  wo contrast; Future

## 2023-05-16 ENCOUNTER — TELEPHONE (OUTPATIENT)
Dept: OBGYN CLINIC | Facility: HOSPITAL | Age: 63
End: 2023-05-16

## 2023-05-16 ENCOUNTER — HOSPITAL ENCOUNTER (EMERGENCY)
Facility: HOSPITAL | Age: 63
Discharge: HOME | End: 2023-05-16
Attending: EMERGENCY MEDICINE

## 2023-05-16 ENCOUNTER — APPOINTMENT (EMERGENCY)
Dept: RADIOLOGY | Facility: HOSPITAL | Age: 63
End: 2023-05-16

## 2023-05-16 VITALS
DIASTOLIC BLOOD PRESSURE: 73 MMHG | RESPIRATION RATE: 18 BRPM | OXYGEN SATURATION: 97 % | TEMPERATURE: 97.7 F | SYSTOLIC BLOOD PRESSURE: 122 MMHG | HEART RATE: 61 BPM

## 2023-05-16 DIAGNOSIS — M17.12 PRIMARY OSTEOARTHRITIS OF LEFT KNEE: Primary | ICD-10-CM

## 2023-05-16 DIAGNOSIS — M25.562 LEFT KNEE PAIN: Primary | ICD-10-CM

## 2023-05-16 RX ORDER — NAPROXEN 500 MG/1
500 TABLET ORAL 2 TIMES DAILY WITH MEALS
Qty: 15 TABLET | Refills: 0 | Status: SHIPPED | OUTPATIENT
Start: 2023-05-16 | End: 2023-05-17

## 2023-05-16 NOTE — TELEPHONE ENCOUNTER
MRI order was changed to stat   Will have my staff reach out to scheduling to see if we can have patient scheduled sooner for MRI

## 2023-05-16 NOTE — TELEPHONE ENCOUNTER
Call to patient sister answered the phone Relayed the message from Dr Henri Jimenez about STAT MRI order given number to call to schedule it  She was also given our call back number to let us know when the MRI is Scheduled for

## 2023-05-16 NOTE — TELEPHONE ENCOUNTER
Agree with recommended plan   Patient is shceduled to have MRI for further evaluation however if increased pain and new injury would recommend proceeding to ED

## 2023-05-16 NOTE — DISCHARGE INSTRUCTIONS
Wear the knee immobilizer when walking around  Apply ice and keep your leg elevated at rest   Continue to take ibuprofen (Motrin, Advil) or acetaminophen (Tylenol) as needed for pain, as per the instructions  Apply topical medications to the area to help with pain as well  Follow-up with orthopedics for further evaluation of your knee, and to see if a referral to help move up your MRI  You could also try to go on a cancellation list or see if there are sooner appointments at any other MRI facility

## 2023-05-16 NOTE — ED PROVIDER NOTES
History  Chief Complaint   Patient presents with   • Knee Injury     Pt states she was going up the steps last night when she felt her left knee pop  Hx of osteoarthritis      HPI    Prior to Admission Medications   Prescriptions Last Dose Informant Patient Reported? Taking? Ozempic, 0 25 or 0 5 MG/DOSE, 2 MG/1 5ML injection pen   Yes No   amphetamine-dextroamphetamine (ADDERALL XR) 10 MG 24 hr capsule   Yes No   Sig: Take 10 mg by mouth   clonazePAM (KlonoPIN) 0 125 mg disintegrating tablet   Yes No   Sig: Apply 0 125 mg to cheek 2 (two) times a day as needed   ergocalciferol (VITAMIN D2) 50,000 units   Yes No   gabapentin (NEURONTIN) 100 mg capsule   Yes No   Sig: Take 100 mg by mouth Three times a day   levothyroxine 100 mcg tablet   No No   Sig: Take 1 tablet (100 mcg total) by mouth daily   liothyronine (CYTOMEL) 25 mcg tablet   Yes No   lithium 300 MG tablet   Yes No   Sig: Take 300 mg by mouth   prazosin (MINIPRESS) 2 mg capsule   Yes No   traZODone (DESYREL) 100 mg tablet   Yes No   Sig: Take 100 mg by mouth   traZODone (DESYREL) 50 mg tablet   Yes No   Sig: Take by mouth      Facility-Administered Medications: None       Past Medical History:   Diagnosis Date   • Asthma All my life; pretty mild these days   • Bipolar 2 disorder ([facility] [address] ) 1990   • Depression    • Diabetes mellitus ([facility] [address] )    • Disease of thyroid gland 2009       Past Surgical History:   Procedure Laterality Date   • HYSTERECTOMY  2007   • KIDNEY SURGERY Right 2009   • KNEE SURGERY  Right meniscus repair   • SINUS SURGERY  1987       Family History   Problem Relation Age of Onset   • Heart disease Mother    • Colon cancer Father    • Neuropathy Father    • Cancer Father    • Heart attack Brother    • Diabetes Maternal Grandfather    • Cancer Maternal Grandmother      I have reviewed and agree with the history as documented      E-Cigarette/Vaping   • E-Cigarette Use Never User      E-Cigarette/Vaping Substances   • Nicotine No      Social History Tobacco Use   • Smoking status: Never   • Smokeless tobacco: Never   Vaping Use   • Vaping Use: Never used   Substance Use Topics   • Alcohol use: Not Currently     Comment: social 1 cup a day wine   • Drug use: Never       Review of Systems    Physical Exam  Physical Exam  Vitals and nursing note reviewed  Constitutional:       General: She is not in acute distress  Appearance: She is well-developed  HENT:      Head: Normocephalic and atraumatic  Eyes:      Conjunctiva/sclera: Conjunctivae normal       Pupils: Pupils are equal, round, and reactive to light  Neck:      Trachea: No tracheal deviation  Cardiovascular:      Rate and Rhythm: Normal rate and regular rhythm  Pulses:           Dorsalis pedis pulses are 2+ on the left side  Posterior tibial pulses are 2+ on the left side  Pulmonary:      Effort: Pulmonary effort is normal  No respiratory distress  Musculoskeletal:      Cervical back: Normal range of motion  Left hip: No tenderness  Left upper leg: No tenderness  Left knee: Swelling present  No deformity, ecchymosis, bony tenderness or crepitus  Decreased range of motion (held at 90 degrees flexion, unable to fully extend due to pain)  Tenderness (diffuse anterior, worst just lateral to patella on both side, extending to lateral knee) present  Normal alignment and normal patellar mobility  Left lower leg: No tenderness  Left ankle: No tenderness  Normal range of motion  Left foot: Normal capillary refill  No tenderness  Skin:     General: Skin is warm and dry  Neurological:      Mental Status: She is alert and oriented to person, place, and time  GCS: GCS eye subscore is 4  GCS verbal subscore is 5  GCS motor subscore is 6     Psychiatric:         Behavior: Behavior normal          Vital Signs  ED Triage Vitals [05/16/23 1114]   Temperature Pulse Respirations Blood Pressure SpO2   98 °F (36 7 °C) 60 18 113/70 97 %      Temp Source Heart "Rate Source Patient Position - Orthostatic VS BP Location FiO2 (%)   Temporal Monitor Sitting Left arm --      Pain Score       5           Vitals:    05/16/23 1114 05/16/23 1158   BP: 113/70 122/73   Pulse: 60 61   Patient Position - Orthostatic VS: Sitting Sitting         Visual Acuity      ED Medications  Medications - No data to display    Diagnostic Studies  Results Reviewed     None                 XR knee 4+ views left injury    (Results Pending)              Procedures  Procedures         ED Course                                             Medical Decision Making  This is a 58-year-old female who presents here today for knee pain  She says she has been having left knee pain for some time  She saw an orthopedist who gave her a cortisone injection, which did not help, and advised exercises, which she says has been exacerbating the pain  She was seen again yesterday for follow-up due to worsening pain, and was told she needed an MRI  She says this is not scheduled until June 5  Last night she was walking up the stairs when it felt like her knee \"shifted\" and she has been having worsening of pain and swelling since then  She denies falls or blunt trauma  She says she has been having worsening pain and difficulties walking since then, particularly with extension of the knee  She has previously worn an Ace wrap as well as an elastic brace, none of which she feels helped very much for her knee  Review of systems: Otherwise negative listed as above    She is well-appearing, no acute distress  She does have some swelling diffusely to the knee  She has tenderness anteriorly, primarily lateral to the patella on both sides and extending out to the lateral knee  She has decreased extension due to pain  Exam is otherwise unremarkable  I discussed with her that it is unlikely that the knee itself shifted given lack of instability at this time    She does not describe lateral movement to suggest patellar " dislocation,, however this transiently may have been etiology  More likely, if she has an underlying meniscal or cartilage injury, this could have shifted and transiently caught, exacerbating her pain  I discussed with her repeat x-ray given severe worsening of pain, however advised that we are unable to get an MRI from the emergency department, which I feel would be the definitive diagnosis for her pain  She does elect to have the x-ray as this has never occurred before  X-ray was read by myself, and shows no acute abnormalities  I discussed with her continued management of symptoms at home, follow-up, and indications for return, she expresses understanding with this plan  Left knee pain: acute illness or injury  Amount and/or Complexity of Data Reviewed  Radiology: ordered and independent interpretation performed  Decision-making details documented in ED Course  Disposition  Final diagnoses:   Left knee pain - acute on chronic     Time reflects when diagnosis was documented in both MDM as applicable and the Disposition within this note     Time User Action Codes Description Comment    5/16/2023 12:58 PM Skyla Soliman Add [M25 562] Left knee pain     5/16/2023 12:58 PM Skyla Soliman Modify [V40 201] Left knee pain acute on chronic      ED Disposition     ED Disposition   Discharge    Condition   Good    Date/Time   Tue May 16, 2023 12:58 PM    Comment   Ade Robles discharge to home/self care  Follow-up Information     Follow up With Specialties Details Why Contact Info    Manuela Jain DO Orthopedics, Sports Medicine Call  to follow up on your pain [phone]  [phone]            Patient's Medications   Discharge Prescriptions    No medications on file       No discharge procedures on file      PDMP Review       Value Time User    PDMP Reviewed  Yes 2/3/2023  1:52 PM Matthew Broussard DO          ED Provider  Electronically Signed by           Barbara Hendricks MD  05/16/23 4907

## 2023-05-16 NOTE — TELEPHONE ENCOUNTER
Caller: Patient     Doctor: Isacc Tavarez    Reason for call: patient is calling stating she was walking up the steps and something happened to her knee  She felt excruciating pain and swelling  She would like to know what she can do, she was just seen in the office yesterday      Call back#: [phone]

## 2023-05-16 NOTE — TELEPHONE ENCOUNTER
Called and spoke w/pt and reviewed yesterday's ov note  Pt to have MRI and f/u w/Dr Pierce  Pt states MRI is scheduled for 6/5/23 and she cannot wait that long  She is in severe pain after walking up steps yesterday and something happened to knee  It is swollen and she is in excruiating pain  She did elevate, ice, used ace wrap and used Voltaren gel for pain and it did not help overnight  She does not think that she can even get to Dr Aashish Richardson office in [facility]  Advised to go to ED is having excruciating pain for evaluation  She will have someone drive her-advised that this is a good idea for her safety and others  FYI:  Pt lives in two story house alone that she recently moved to  Does not have many contacts  She has been going up and down stairs on her bum

## 2023-05-16 NOTE — TELEPHONE ENCOUNTER
Caller: Sister    Doctor: Dr Vincent Heath    Reason for call: Sister calling asking it speak to triage nurse  Warm transfer to triage nurse      Call back#: [phone]

## 2023-05-16 NOTE — TELEPHONE ENCOUNTER
Received call from Gabriellapt's sister and pt went to  ED  They think she has a torn meniscus or ligament  And pt is in terrible pain  She may need surgery  Pain level is a 9  States pt was only given Tylenol for pain  X-ray was done  Sister is very concerned about pt  She would like to know if MRI can be done sooner than the two weeks it is scheduled for  Pt can call   to see if anyone cancelled  Please advise further  CB [id number] [facility], sister

## 2023-05-17 ENCOUNTER — HOSPITAL ENCOUNTER (OUTPATIENT)
Dept: MRI IMAGING | Facility: CLINIC | Age: 63
Discharge: HOME | End: 2023-05-17

## 2023-05-17 DIAGNOSIS — M17.12 PRIMARY OSTEOARTHRITIS OF LEFT KNEE: Primary | ICD-10-CM

## 2023-05-17 DIAGNOSIS — M25.562 ACUTE PAIN OF LEFT KNEE: ICD-10-CM

## 2023-05-17 NOTE — TELEPHONE ENCOUNTER
Spoke to the patient on the telephone  MRI results of the left knee were reviewed which revealed moderate degree of cartilage wearing in the patellofemoral and medial compartment  Small radial meniscus tear was additionally noted  We discussed that patient's symptoms seem to be correlated to underlying knee osteoarthritis  She did not receive much relief with the initial cortisone injection  We discussed Visco injections and authorization was submitted  We will follow-up once prior Auth is approved    She rates the pain 9 out of 10 in severity

## 2023-05-18 ENCOUNTER — TELEPHONE (OUTPATIENT)
Dept: OBGYN CLINIC | Facility: HOSPITAL | Age: 63
End: 2023-05-18

## 2023-05-22 ENCOUNTER — TELEPHONE (OUTPATIENT)
Dept: OBGYN CLINIC | Facility: HOSPITAL | Age: 63
End: 2023-05-22

## 2023-05-22 NOTE — TELEPHONE ENCOUNTER
Call to this patient to make her aware that the Auth is done by a centralized team and they will get Auth and call her to schedule

## 2023-05-22 NOTE — TELEPHONE ENCOUNTER
Caller: Patient    Doctor: Dr Missael Waters    Reason for call: Patient calling to check on status of authorization for visco injection of the left knee  She stated that she called her insurance and they have no record of injection being ordered  Verified that there is a referral in system  Patient asking if the authorization process can get started      Call back#: [phone]

## 2023-05-22 NOTE — TELEPHONE ENCOUNTER
LEFT DETAILED MESSAGE FOR THE PT  ADVISED HER THE SCRIPT WAS CALLED INTO THE SPECIALTY PHARMACY  WE WILL CALL HER TO SET UP APPT  ONCE WE RECEIVE THE MEDICATION

## 2023-05-23 NOTE — TELEPHONE ENCOUNTER
Caller: Patient    Doctor: Dr Daniel Brown    Reason for call: Patient calling stating the she called the pharmacy and they told her that they called the office that they need a call to arrange for delivery      Call back#: [phone]

## 2023-05-23 NOTE — TELEPHONE ENCOUNTER
Patient returned my call, verbalized understanding of the process of ordering her visco from the specialty pharmacy

## 2023-05-24 NOTE — TELEPHONE ENCOUNTER
Caller: patient    Doctor: Jasmin Pablo    Reason for call: pt called asking if the office was able to reach out to the pharmacy to set up delivery for the medication    Please call pt    Call back#: [phone]

## 2023-05-24 NOTE — TELEPHONE ENCOUNTER
Caller: Self    Doctor: Sari Dent    Reason for call: Patient would like to know if someone spoke with [facility] to set up delivery  She would like to get this done while she has help at home which is not going to be much longer       Call back#: [phone]

## 2023-05-24 NOTE — TELEPHONE ENCOUNTER
SPOKE TO PT  [facility] CARMEN HER THEY HAVE BEEN TRYING TO CALL US TO SET UP DELIVERY, ADVISED PT WE HAVE NOT RECEIVED A CALL BUT I WILL CALL THEM TO CHECK THE STATUS     CALLED [facility] AND SCHEDULED MEDICATION TO BE DELIVERED ON [phone]

## 2023-05-30 NOTE — TELEPHONE ENCOUNTER
Caller: Patient    Doctor: Edgar Monaco    Reason for call: Has some questions about upcoming gel injections :     -Was Durolane preferred by the doctor or is this the one insurance approved?     -Is the injection going to be painful?     -Will she be able to drive after the injection , she does have a 30 min commute     -Was advised it can take 3 weeks to work , what will pain level be like during that three weeks ?     -Not in a lot of pain right now and has been healing while she has been waiting for injection  She is wanting to know should she still have the injection?     -What is the chance or percent of the injection working?  She read it only works about 50% of the time     Call back#: [phone]

## 2023-06-01 ENCOUNTER — TELEPHONE (OUTPATIENT)
Dept: OBGYN CLINIC | Facility: HOSPITAL | Age: 63
End: 2023-06-01

## 2023-06-01 ENCOUNTER — PROCEDURE VISIT (OUTPATIENT)
Dept: OBGYN CLINIC | Facility: CLINIC | Age: 63
End: 2023-06-01

## 2023-06-01 VITALS
WEIGHT: 225 LBS | BODY MASS INDEX: 36.16 KG/M2 | HEART RATE: 68 BPM | DIASTOLIC BLOOD PRESSURE: 71 MMHG | HEIGHT: 66 IN | SYSTOLIC BLOOD PRESSURE: 120 MMHG

## 2023-06-01 DIAGNOSIS — M17.12 PRIMARY OSTEOARTHRITIS OF LEFT KNEE: Primary | ICD-10-CM

## 2023-06-01 RX ORDER — BUPIVACAINE HYDROCHLORIDE 2.5 MG/ML
4 INJECTION, SOLUTION INFILTRATION; PERINEURAL
Status: COMPLETED | OUTPATIENT
Start: 2023-06-01 | End: 2023-06-01

## 2023-06-01 RX ADMIN — BUPIVACAINE HYDROCHLORIDE 4 ML: 2.5 INJECTION, SOLUTION INFILTRATION; PERINEURAL at 11:30

## 2023-06-01 NOTE — TELEPHONE ENCOUNTER
Spoke to patient and advised that it is not commonly seen after visco injection. We have local irritation to the knee that may last for a week, with some swelling and pain on weight bearing. RICE method, tylenol/NSAIDS if able to take them. Monitor these symptoms and if not resolved in morning, follow up with PCP. Verbalized understanding.

## 2023-06-01 NOTE — PROGRESS NOTES
Large joint arthrocentesis: L knee  Universal Protocol:  Consent: Verbal consent obtained  Risks and benefits: risks, benefits and alternatives were discussed  Consent given by: patient    Supporting Documentation  Indications: pain   Procedure Details  Location: knee - L knee  Needle size: 22 G  Ultrasound guidance: no  Approach: anterolateral  Medications administered: 3 mL sodium hyaluronate 60 MG/3ML; 4 mL bupivacaine 0 25 %    Patient tolerance: patient tolerated the procedure well with no immediate complications        Patient states that the left knee pain symptoms have been improving slightly over the past 2 weeks  Currently rates the pain 5 out of 10 in severity

## 2023-06-01 NOTE — TELEPHONE ENCOUNTER
Caller: Patient    Doctor: Shanel Duron     Reason for call: PT had a Durolane inj this morning, now she is having nausea, cold chills wants to know if this is a normal side effect    Call back#: [phone]

## 2023-06-05 ENCOUNTER — TELEPHONE (OUTPATIENT)
Dept: OBGYN CLINIC | Facility: HOSPITAL | Age: 63
End: 2023-06-05

## 2023-06-05 DIAGNOSIS — M17.12 PRIMARY OSTEOARTHRITIS OF LEFT KNEE: Primary | ICD-10-CM

## 2023-06-05 NOTE — TELEPHONE ENCOUNTER
Caller: Patient     Doctor: Noé Wharton    Reason for call: Patient is calling to request a PT script for her left knee be placed in her chart    Call back#: [phone]

## 2023-06-05 NOTE — TELEPHONE ENCOUNTER
Called and spoke with pt and she received order and has appt  States she is doing a little better each day

## 2023-06-08 ENCOUNTER — EVALUATION (OUTPATIENT)
Dept: PHYSICAL THERAPY | Facility: CLINIC | Age: 63
End: 2023-06-08

## 2023-06-08 DIAGNOSIS — M17.12 PRIMARY OSTEOARTHRITIS OF LEFT KNEE: ICD-10-CM

## 2023-06-08 PROCEDURE — 97161 PT EVAL LOW COMPLEX 20 MIN: CPT | Performed by: PHYSICAL THERAPIST

## 2023-06-08 PROCEDURE — 97140 MANUAL THERAPY 1/> REGIONS: CPT | Performed by: PHYSICAL THERAPIST

## 2023-06-08 NOTE — PROGRESS NOTES
PT Evaluation     Today's date: 2023  Patient name: Tejas Gonsales  : 1960  MRN: [id number]  Referring provider: Nelly Gotti DO  Dx:   Encounter Diagnosis     ICD-10-CM    1  Primary osteoarthritis of left knee  M17 12 Ambulatory Referral to Physical Therapy          Start Time: 1030  Stop Time: 1100  Total time in clinic (min): 30 minutes    Assessment  Assessment details: Patient is a 60 y o  female who presents to physical therapy with c/o left knee pain consistent with PFPS/osteoarthritis  Patient presents to evaluation with pain, decreased range of motion, decreased strength, and decreased tolerance to activity  Pt was issued initial HEP focusing on ROM/LE flexibility to begin with at home, as well as applied K-tape to left knee for patellar unloading with subjective reports of improved symptoms with ambulation and squatting after application of tape  I discussed risks, benefits, and alternatives to treatment, and answered all patient questions to patient satisfaction  Patient presents with baseline FOTO score of 41 indicating limited tolerance/ability to complete ADLs  Patient is an appropriate candidate for skilled PT and would benefit from skilled PT services to address the aforementioned impairments, achieve goals, maximize function, and improve quality of life  Pt is in agreement with this plan      Patient Education: activity modifications as needed, pacing of activities, importance of HEP compliance, PT prognosis/POC    Impairments: abnormal gait, abnormal or restricted ROM, activity intolerance, impaired balance, impaired physical strength, lacks appropriate home exercise program and pain with function  Barriers to therapy: Financial (self-pay)    Goals  ST weeks  Pt will demonstrate good understanding and compliance with HEP   Pt will increase active left knee extension to 0* to normalize gait with TKE at IC phase of gait   Pt will increase active left knee flexion to 120* to normalize gait and improve tolerance/ability to perform functional activities to include stairs and squatting   Pt will decrease left knee pain to 3-4/10 with activity     LT weeks  Pt will increase left hip/knee strength to 5-/5 to allow for improved ability to squat, negotiate stairs, and prolonged community ambulation  Pt will decrease left knee pain to 0-1/10 with activity     Pt will demonstrate negative Elys test on left    Pt will ambulate with least restrictive AD and normal gait mechanics  Pt will exhibit proper squatting/lifting mechanics without reliance on external cues for correction of form   Pt will be able to tolerate prolonged standing/walking activities > 30 minutes without provocation of knee pain    Pt will improve FOTO score to > or = to 62 to indicate improved functional abilities       Plan  Plan details: 1x/wk due to financial concerns  Patient would benefit from: skilled physical therapy and PT eval  Planned modality interventions: cryotherapy and thermotherapy: hydrocollator packs  Planned therapy interventions: ADL training, activity modification, joint mobilization, manual therapy, patient education, balance, home exercise program, graded exercise, graded activity, gait training, functional ROM exercises, flexibility, strengthening, therapeutic activities, stretching, therapeutic exercise and self care  Frequency: 1x week  Duration in weeks: 8  Plan of Care beginning date: 2023  Plan of Care expiration date: 8/3/2023  Treatment plan discussed with: patient        Subjective Evaluation    History of Present Illness  Mechanism of injury: Pt reports to PT with c/o left knee pain that started about 6 weeks ago and progressively worsened with reports of buckling  Pt reports having recent CSI but had no relief in symptoms and eventually pain was so intense it caused her to go to ER on 23  Pt had left knee MRI earlier this week (results below)   Pt has hx of similar symptoms on right knee but has hx of meniscectomy about 5 years ago with good outcome but still has intermittent pains  Pt had recent viscosupplementation injection 1 week ago and feels this seems to be helping gradually  Pt denies any additional recent buckling episodes but does feel it will occasionally lock on her, as well as intermittent swelling that is associated with activity level  Pt denies any falls but was using SPC intermittently up until a few days ago  Pt denies N/T into extremities  Pt denies recent fever or shortness of breath  MRI of left knee reveals the following per radiologist report:   -Radial tear posterior root of the medial meniscus      -Moderate patellofemoral compartment cartilage loss  Mild cartilage thinning weightbearing portion of the medial femoral tibial compartment      -Focal mild stress response central posterior medial tibial plateau      -Small to moderate joint effusion      Aggravating factors: Squatting, stairs, prolonged standing/walking, getting up after prolonged sitting, knee ROM    Easing Factors:  Viscosupplementation, tylenol, CBD cream, ice/heat    PLOF: Hx of left knee pain past 6 weeks, denies pain prior to this and had full function     PMH: Diabetic type II (controlled w/ meds)  Pain  Current pain ratin  At best pain ratin  At worst pain rating: 10  Quality: sharp, dull ache and throbbing      Diagnostic Tests  MRI studies: abnormal  Treatments  Current treatment: injection treatment  Patient Goals  Patient goals for therapy: decreased pain, increased motion, increased strength, independence with ADLs/IADLs, return to sport/leisure activities, decreased edema and improved balance          Objective     General Comments:      Knee Comments  Left knee AROM: 0-2-110* (pain/stiffness at end ranges)    Left knee MMT: Flexion 5-/5 Extension 5-/5 (pain)    Left hip MMT: Flex 4/5 Abd 4/5      Gait Assessment: Pt ambulates unassisted with antalgic gait    Palpation: TTP medial/lateral joint lines, akiko-patellar tenderness    Varus stress: (-)  Valgus stress: (-)  Ant Drawer:(-)  Lachmans: (-)  Patellar Compression: (+)  Patellar Apprehension: (-)  Elys: (+)    FOTO: 41               **[place] sessions of 1 unit due to self-pay**   Diagnosis: Left knee PFPS/osteoarthritis   Precautions: Diabetic type II (controlled w/ meds)   POC Expires: 8/3/23   Re-evaluation Date:  7/6/23   FOTO Scores/Date: Goal - 62; 6/8 - 41   Visit Count 1/10       Manuals 6/8       K-tape Left knee patellar unloading                       Ther Ex 6/8       Heel slides w/ strap HEP       Heel prop HEP       Quad set HEP       Long sit HS stretch HEP                                       HEP Creation/instruction       Neuro Re-Ed                                                               Ther Act                                                                                 Modalities

## 2023-06-15 ENCOUNTER — OFFICE VISIT (OUTPATIENT)
Dept: PHYSICAL THERAPY | Facility: CLINIC | Age: 63
End: 2023-06-15

## 2023-06-15 DIAGNOSIS — M17.12 PRIMARY OSTEOARTHRITIS OF LEFT KNEE: Primary | ICD-10-CM

## 2023-06-15 PROCEDURE — 97110 THERAPEUTIC EXERCISES: CPT | Performed by: PHYSICAL THERAPIST

## 2023-06-15 NOTE — PROGRESS NOTES
"Daily Note     Today's date: 6/15/2023  Patient name: Whitney Stewart  : 1960  MRN: [id number]  Referring provider: Belem Henry DO  Dx:   Encounter Diagnosis     ICD-10-CM    1  Primary osteoarthritis of left knee  M17 12           Start Time: 1200  Stop Time: 1221  Total time in clinic (min): 21 minutes    Subjective: Pt reports good pain relief with tape, asks to be instructed on how to perform this herself as she is only coming for a few sessions more due to being self-pay  Pt reports good compliance with her initial exercises  Objective: See treatment diary below      Assessment: Pt demonstrates good recall of prior ROM/flexibility exercises with good improvement in her knee mobility with full and pain-free flexion but still with minimal loss of knee extension but improved from evaluation to -1*  Pt was instructed on progressing home exercises to include strengthening exercises with good tolerance but does require frequent cues for proper set up and eccentric control, as well as avoidance of posterior hip rocking during SL hip abduction  Pt was issued updated home program and encouraged to continue daily as symptoms allow, as well as to obtain a set of adjustable ankle weights for home to allow for eventual addition of weight  Plan: Continue per plan of care  Progress treatment as tolerated         **[place] sessions of 1 unit due to self-pay**   Diagnosis: Left knee PFPS/osteoarthritis   Precautions: Diabetic type II (controlled w/ meds)   POC Expires: 8/3/23   Re-evaluation Date:  23   FOTO Scores/Date: Goal - 62;  -    Visit Count 1/10 2/10      Manuals 6/8 6/15      K-tape Left knee patellar unloading Instructed on how to apply tape                      Ther Ex 6/8 6/15      Heel slides w/ strap HEP 10x5\" (126*)      Heel prop HEP HEP      Quad set HEP 10x10\" (-1*)      Long sit HS stretch HEP HEP      SLR  10x      SL hip abd  10x      LAQ  10x      Sit to stand  10x            " HEP Creation/instruction Creation/instruction      Neuro Re-Ed                                                               Ther Act                                                                                 Modalities

## 2023-06-19 ENCOUNTER — TELEPHONE (OUTPATIENT)
Dept: OBGYN CLINIC | Facility: HOSPITAL | Age: 63
End: 2023-06-19

## 2023-06-19 NOTE — TELEPHONE ENCOUNTER
Caller: patient    Doctor: Nara Nguyen    Reason for call: patient states they are feeling pretty good with the gel injection  Some of the PT exercises are causing pain in the kneecap and behind the knee  Patient states there is some swelling in the kneecap area  Patient states they have a meeting between 10am-11am today       Call back#: [phone]

## 2023-06-19 NOTE — TELEPHONE ENCOUNTER
If patient feels that physical therapy is exacerbating her pain she can stop and we can continue to monitor especially if pain symptoms are well managed following the injection    Please advise

## 2023-06-22 ENCOUNTER — OFFICE VISIT (OUTPATIENT)
Dept: PHYSICAL THERAPY | Facility: CLINIC | Age: 63
End: 2023-06-22

## 2023-06-22 DIAGNOSIS — M17.12 PRIMARY OSTEOARTHRITIS OF LEFT KNEE: Primary | ICD-10-CM

## 2023-06-22 PROCEDURE — 97110 THERAPEUTIC EXERCISES: CPT | Performed by: PHYSICAL THERAPIST

## 2023-06-22 PROCEDURE — 97140 MANUAL THERAPY 1/> REGIONS: CPT | Performed by: PHYSICAL THERAPIST

## 2023-06-22 NOTE — PROGRESS NOTES
"Daily Note     Today's date: 2023  Patient name: Fatoumata Villegas  : 1960  MRN: [id number]  Referring provider: Lalito Duarte DO  Dx:   Encounter Diagnosis     ICD-10-CM    1  Primary osteoarthritis of left knee  M17 12           Start Time: 1200  Stop Time: 1230  Total time in clinic (min): 30 minutes    Subjective: Pt reports buying kinesiotape and ankle weights for home, would like to be instructed how to tape her knee again today as she does feel like this helps  Pt reports some increased knee pain with extension ROM exercises  Objective: See treatment diary below      Assessment:  Pt was instructed on how to properly apply K-tape to knee and verbalizes understanding with this and to continue to use tape in the short term to help decrease pain and improve her exercise tolerance  Pt exhibits improved tolerance with strengthening exercises but does remain reliant on cues for correction of form and eccentric control during completion  Progressed exercises to include SLS, DL heel raises, and lateral band walks with overall good tolerance and no increase in knee pain symptoms but very limited with SLS due to poor stability and only able to hold between 3-5\" before requiring UE assist on plinth table to prevent LOB  Pt instructed to perform these safety at home in front of counter with chair behind her to avoid falls  Pt was issued updated HEP and encouraged to continue with regularly over the next month while out of town and plan to return for 1 more visit upon return to review symptoms and further progress her home exercises with plan to discharge at that time  Plan: Continue per plan of care  Progress treatment as tolerated         **[place] sessions of 1 unit due to self-pay**   Diagnosis: Left knee PFPS/osteoarthritis   Precautions: Diabetic type II (controlled w/ meds)   POC Expires: 8/3/23   Re-evaluation Date:  23   FOTO Scores/Date: Goal - 62;  -    Visit Count 1/10 2/10 3/10   " "  Manuals 6/8 6/15 6/22     K-tape Left knee patellar unloading Instructed on how to apply tape Performed - KD; also reviewed on how to apply                     Ther Ex 6/8 6/15 6/22     Heel slides w/ strap HEP 10x5\" (126*) 10x5\"     Heel prop HEP HEP HEP     Quad set HEP 10x10\" (-1*) 10\" (0*)     Long sit HS stretch HEP HEP HEP     SLR  10x 10x     SL hip abd  10x 10x     LAQ  10x 10x     Sit to stand  10x 10x     SLS   10x3-5\"     DL heel raise   2x10     Lateral band walk   grn at knees 4 laps             HEP Creation/instruction Creation/instruction Creation/instruction     Neuro Re-Ed                                                               Ther Act                                                                                 Modalities                                              "

## 2023-07-14 ENCOUNTER — TELEPHONE (OUTPATIENT)
Age: 63
End: 2023-07-14

## 2023-07-14 DIAGNOSIS — R42 POSTURAL DIZZINESS: Primary | ICD-10-CM

## 2023-07-14 NOTE — TELEPHONE ENCOUNTER
Patient is in [place] need doctor referral for PT for her vertigo it needs to state urgent today. It needs to be faxed to [phone] attn Phill Alexander or Matthew Howe outpatient [facility].     Please call her sister at [phone] back when this has been faxed

## 2023-07-14 NOTE — TELEPHONE ENCOUNTER
Patient's sister called    Requesting a refill of Meclizine  For her sisters vertigo  Last filled 4/11/23    Please send to  [address] in [place], [place]

## 2023-07-17 RX ORDER — MECLIZINE HYDROCHLORIDE 25 MG/1
25 TABLET ORAL 3 TIMES DAILY PRN
Qty: 30 TABLET | Refills: 0 | Status: SHIPPED | OUTPATIENT
Start: 2023-07-17

## 2023-07-31 ENCOUNTER — EVALUATION (OUTPATIENT)
Age: 63
End: 2023-07-31

## 2023-07-31 DIAGNOSIS — H81.11 BPPV (BENIGN PAROXYSMAL POSITIONAL VERTIGO), RIGHT: Primary | ICD-10-CM

## 2023-07-31 DIAGNOSIS — R42 VERTIGO: ICD-10-CM

## 2023-07-31 PROCEDURE — 97161 PT EVAL LOW COMPLEX 20 MIN: CPT | Performed by: PHYSICAL THERAPIST

## 2023-07-31 NOTE — PROGRESS NOTES
PT Evaluation          POC expires Auth Status    Self pay                       Visit/Unit Tracking  Date 23              Self pay                            Today's date: 2023  Patient name: Yusef Keen  : 1960  MRN: [id number]  Referring provider: Sissy Dorsey  Dx:   Encounter Diagnosis     ICD-10-CM    1. BPPV (benign paroxysmal positional vertigo), right  H81.11       2. Vertigo  R42             Assessment  Assessment details: Patient is a 60 y.o. Female who presents to skilled outpatient PT with Vertigo w spinning sensation. Cervical spine integrity intact per normal and negative results of mVBI, Sharp Pamela, and Alar Stability Tests respectively. Patient displayed R upward torisonal nystagmus with positional assessment indicating likely R Posterior Canalithiasis. Trialed R Epley Maneuver today with Good results and eventual resolution of symptoms by final repetition. Educated the patient on the anatomy of the inner ear, potential for residual dizziness for up to 48 hours following session, and to seek higher level of care if symptoms worsen or change and She was in good verbal understanding. She will benefit from skilled outpatient PT in order to reduce dizziness symptoms and return to PLOF. Patient verbalized understanding of POC. Please contact me if you have any questions or recommendations. Thank you for the referral and the opportunity to share in [facility].       Cut off score   All date taken from [facility] Neuro Section or Rehab Measures    DGI:  MDC for Vestibular Disorders: 4 points  [facility]elling Older Adults: 3 Points  Falls risk cut off: <19/24    FGA:  MCID: 4 points  Geriatrics/Community Dwelling Older Adults: </= 22/30 fall risk  Geriatrics/Community Dwelling Older Adults: </= 20/30 unexplained falls in the next 6 months  Parkinsons: </= 18/30 fall risk    mCTSIB (normed on ages 24-63, lower number is less sway or better static balance)  Eyes open firm surface (norm 0.21-0.48)  Eyes closed firm surface (norm 0.48-0.99)  Eyes open foam surface (norm 0.38-0.71)  Eyes closed foam surface (norm 0.70-2.22)    DHI:  0-39: low perception of handicap  40-69: moderate perception of handicap  : severe perception of handicap  > 60: increased risk for falls        Impairments: activity intolerance, impaired balance, lacks appropriate, HEP, safety issue  Understanding of Dx/Px/POC: Good  Prognosis: Good      Goals    BPPV Goals (4 weeks):  - Patient will report complete resolution of symptoms in order to promote return to PLOF  - Patient will complete FGA in order to promote return to safe performance of ADLs  - Patient will demonstrate (-) Nichols-Hallpike test on R side    Plan  Patient would benefit from: PT Eval  Planned therapy interventions: balance, HEP, manual therapy, neuromuscular re-education, patient education  Frequency: 1-3x per week  Plan of Care beginning date: 7/31/2023  Plan of Care expiration date: 2 months - 9/30/2023  Treatment plan discussed with: Patient        Subjective Evaluation    History of Present Illness  - Mechanism of injury: Pt reports spinning dizziness restarting middle of July similar to when I was treated in April. Had another ear infection since last being seen here at this clinic. Notes 5 mini episodes of spinning sensation when getting out of bed. Has been taking meclizine which seems to be taking the edge off a little.        Dizziness Subjective  - How long does dizziness last: 3 to 4 seconds   - How would you describe the dizziness: spinning sensation   - Rolling in bed: Yes  - Supine to/from sit: Yes  - Recent hearing loss: No  - Tinnitus: No  - Aural fullness/ear pain: No  - Vision changes: No  - History of recent viral infections: No  - History of migraines: No    Red Flag Screen  - Numbness: No  - Tingling: No  - Weakness: No  - Unilateral hearing loss: No  - Slurred speech: No  - Progressive hearing loss: No  - Tremors: No  - Poor coordination: No  - UMN signs: No  - LoC: No  - Rigidity: No  - Visual field loss: No  - Memory loss: No  - CN dysfunction: No  - Vertical nystagmus: No    Pain  Current pain ratin/10  At best pain rating: 3/10  At worst pain ratin/10  Location: left knee, tear in knee   Aggravating factors: walking,     Social Support  Steps to enter house: 4 steps   Stairs in house: 12 steps with HR   Lives in: 2 story   Lives with: alone     Employment status: retired   Hand dominance: left     Treatments  Previous treatment: NA  Current treatment: NA  Diagnostic Testing: NA      Objective   Cervical spine integrity intact per normal   Ngative results of mVBI, Sharp Pamela, and Alar Stability Tests respectively.     BPPV Objective  Integrity Testing  - mVBI: denies any passing out sensation     Positional Testing  - R Kyle-Hallpike: + lasting 23 seconds with upbeating torsional nystagmus   - L [place]-Hallpike: negative x 2  - R Roll Test: negative x 2  - L Roll Test: negative x 2       Outcome Measures Initial Eval  2023        mCTSIB  - FTEO (firm)  - FTEC (firm)  - FTEO (foam)  - FTEC (foam)   Defer        DGI Defer        FGA Defer        10 meter Defer        DHI 48/100 moderate perception of handicap                                                           Precautions: fall risk   Past Medical History:   Diagnosis Date   • Asthma All my life; pretty mild these days   • Bipolar 2 disorder ([address])    • Depression    • Diabetes mellitus ([address])    • Disease of thyroid gland

## 2023-08-02 ENCOUNTER — OFFICE VISIT (OUTPATIENT)
Age: 63
End: 2023-08-02

## 2023-08-02 DIAGNOSIS — H81.11 BPPV (BENIGN PAROXYSMAL POSITIONAL VERTIGO), RIGHT: Primary | ICD-10-CM

## 2023-08-02 DIAGNOSIS — R42 VERTIGO: ICD-10-CM

## 2023-08-02 PROCEDURE — 97112 NEUROMUSCULAR REEDUCATION: CPT | Performed by: PHYSICAL THERAPIST

## 2023-08-02 NOTE — PROGRESS NOTES
Daily Note     Today's date: 2023  Patient name: Stefanie Guillermo  : 1960  MRN: [id number]  Referring provider: Barb Watts  Dx:   Encounter Diagnosis     ICD-10-CM    1. BPPV (benign paroxysmal positional vertigo), right  H81.11       2. Vertigo  R42                      Subjective: Pt reports feeling good yesterday. Feels spinning today due to looking up towards the ceiling. Objective: See treatment diary below      Positional Testing  - R Kyle-Hallpike: + lasting 10 seconds with upbeating torsional nystagmus     Eply conducted 4 times     Assessment: Pt had +finding for BPPV on right side with upbeating torsional nystagmus lasting 10 seconds, Eply conducted 4 times with negative finding after 3rd attempt. Patient would benefit from continued PT      Plan: Continue per plan of care.       POC expires Auth Status    Self pay                       Visit/Unit Tracking  Date 23             Self pay

## 2023-08-04 ENCOUNTER — APPOINTMENT (OUTPATIENT)
Age: 63
End: 2023-08-04

## 2023-08-07 ENCOUNTER — OFFICE VISIT (OUTPATIENT)
Age: 63
End: 2023-08-07
Payer: COMMERCIAL

## 2023-08-07 VITALS
SYSTOLIC BLOOD PRESSURE: 100 MMHG | BODY MASS INDEX: 36.15 KG/M2 | HEART RATE: 78 BPM | TEMPERATURE: 97.5 F | OXYGEN SATURATION: 98 % | DIASTOLIC BLOOD PRESSURE: 68 MMHG | RESPIRATION RATE: 18 BRPM | WEIGHT: 224 LBS

## 2023-08-07 DIAGNOSIS — R42 POSTURAL DIZZINESS: ICD-10-CM

## 2023-08-07 DIAGNOSIS — Q24.8 ABNORMAL CARDIAC VALVE: ICD-10-CM

## 2023-08-07 DIAGNOSIS — R26.81 GAIT INSTABILITY: ICD-10-CM

## 2023-08-07 DIAGNOSIS — Z12.31 BREAST CANCER SCREENING BY MAMMOGRAM: ICD-10-CM

## 2023-08-07 DIAGNOSIS — E11.9 TYPE 2 DIABETES MELLITUS WITHOUT COMPLICATION, WITHOUT LONG-TERM CURRENT USE OF INSULIN (HCC): Primary | ICD-10-CM

## 2023-08-07 DIAGNOSIS — Z12.11 COLON CANCER SCREENING: ICD-10-CM

## 2023-08-07 LAB
LEFT EYE DIABETIC RETINOPATHY: NORMAL
LEFT EYE IMAGE QUALITY: NORMAL
LEFT EYE MACULAR EDEMA: NORMAL
LEFT EYE OTHER RETINOPATHY: NORMAL
RIGHT EYE DIABETIC RETINOPATHY: NORMAL
RIGHT EYE IMAGE QUALITY: NORMAL
RIGHT EYE MACULAR EDEMA: NORMAL
RIGHT EYE OTHER RETINOPATHY: NORMAL
SEVERITY (EYE EXAM): NORMAL

## 2023-08-07 PROCEDURE — 92250 FUNDUS PHOTOGRAPHY W/I&R: CPT | Performed by: FAMILY MEDICINE

## 2023-08-07 PROCEDURE — 99214 OFFICE O/P EST MOD 30 MIN: CPT | Performed by: FAMILY MEDICINE

## 2023-08-07 RX ORDER — GABAPENTIN 300 MG/1
CAPSULE ORAL
COMMUNITY
Start: 2023-07-31

## 2023-08-07 RX ORDER — QUETIAPINE FUMARATE 50 MG/1
TABLET, EXTENDED RELEASE ORAL
COMMUNITY
Start: 2023-07-12

## 2023-08-07 RX ORDER — BLOOD-GLUCOSE SENSOR
EACH MISCELLANEOUS
COMMUNITY
Start: 2023-06-21

## 2023-08-07 RX ORDER — HYALURONATE SODIUM, STABILIZED 60 MG/3 ML
SYRINGE (ML) INTRAARTICULAR
COMMUNITY
Start: 2023-05-23

## 2023-08-07 NOTE — PROGRESS NOTES
Name: Morgan Genao      :       MRN: [id number]  Encounter Provider: Chao Oconnor DO  Encounter Date: 2023   Encounter department: [address] [facility]     1. Type 2 diabetes mellitus without complication, without long-term current use of insulin (HCC)-controlled with an a1c of 6.2  continue ozempic 2mg qWeekly. -     Albumin / creatinine urine ratio; Future; Expected date: 2023  -     Comprehensive metabolic panel; Future; Expected date: 2023  -     Hemoglobin A1C; Future; Expected date: 2023  -     [facility] Diabetic eye exam    2. Gait instability-2/2 OA of b/l knees. Follows up with orthopedics soon. 3. Breast cancer screening by mammogram  -     Mammo screening bilateral w 3d & cad; Future; Expected date: 2023    4. Colon cancer screening  -     Ambulatory Referral to Gastroenterology; Future    5. Postural dizziness- mild improvement s/p vestibular therapy. -     Lithium level; Future  -     CBC and differential; Future  -     TSH, 3rd generation with Free T4 reflex; Future    7. Abnormal cardiac valve- pt reports being told she had an abnormal valve. No murmur on exam. Will evaluate with imaging.   -     Echo complete w/ contrast if indicated; Future; Expected date: 2023           Subjective      Pt presents for f/u  Continues to have episodes of dizziness. assoicaed wit nauasea. No vomiitng. Went through vestibular therapy and it helped some. Review of Systems   Constitutional: Negative for fever. Respiratory: Negative for shortness of breath. Gastrointestinal: Positive for nausea. Negative for abdominal pain. Musculoskeletal: Positive for gait problem. Neurological: Positive for dizziness. Negative for headaches.        Current Outpatient Medications on File Prior to Visit   Medication Sig   • amphetamine-dextroamphetamine (ADDERALL XR) 10 MG 24 hr capsule Take 10 mg by mouth   • clonazePAM (KlonoPIN) 0.125 mg disintegrating tablet Apply 0.125 mg to cheek 2 (two) times a day as needed   • Continuous Blood Gluc Sensor (FreeStyle Zully 3 Sensor) MISC 1 DEVICE SUBCUT EVERY 2 WEEKS FOR 1 MONTH(S)   • Durolane 60 MG/3ML injection    • ergocalciferol (VITAMIN D2) 50,000 units    • gabapentin (NEURONTIN) 100 mg capsule Take 100 mg by mouth Three times a day   • gabapentin (NEURONTIN) 300 mg capsule TAKE 4 CAPSULES BY ORAL ROUTE AT BEDTIME   • liothyronine (CYTOMEL) 25 mcg tablet    • lithium 300 MG tablet Take 1,200 mg by mouth   • meclizine (ANTIVERT) 25 mg tablet Take 1 tablet (25 mg total) by mouth 3 (three) times a day as needed for dizziness   • Ozempic, 0.25 or 0.5 MG/DOSE, 2 MG/1.5ML injection pen    • prazosin (MINIPRESS) 2 mg capsule    • QUEtiapine (SEROquel XR) 50 mg TAKE 1 TABLET BY MOUTH EVERY EVENING WITHOUT FOOD OR WITH LIGHT MEAL   • traZODone (DESYREL) 100 mg tablet Take 100 mg by mouth   • traZODone (DESYREL) 50 mg tablet Take by mouth   • levothyroxine 100 mcg tablet Take 1 tablet (100 mcg total) by mouth daily       Objective     /68 (BP Location: Left arm, Patient Position: Sitting, Cuff Size: Large)   Pulse 78   Temp 97.5 °F (36.4 °C) (Temporal)   Resp 18   Wt 102 kg (224 lb)   SpO2 98%   BMI 36.15 kg/m²     Physical Exam  HENT:      Head: Normocephalic. Eyes:      Conjunctiva/sclera: Conjunctivae normal.   Cardiovascular:      Rate and Rhythm: Normal rate and regular rhythm. Pulses: no weak pulses          Dorsalis pedis pulses are 1+ on the right side and 1+ on the left side. Pulmonary:      Effort: Pulmonary effort is normal.      Breath sounds: Normal breath sounds. Abdominal:      General: Bowel sounds are normal.      Palpations: Abdomen is soft. Tenderness: There is no abdominal tenderness. Feet:      Right foot:      Skin integrity: No callus or dry skin. Left foot:      Skin integrity: No callus or dry skin.    Neurological:      Mental Status: She is alert. Patient's shoes and socks removed. Right Foot/Ankle   Right Foot Inspection  Skin Exam: No dry skin, no callus and no callus. Toe Exam: No swelling    Sensory   Monofilament testing: intact    Vascular  Capillary refills: < 3 seconds  The right DP pulse is 1+. Left Foot/Ankle  Left Foot Inspection  Skin Exam: No dry skin and no callus. Toe Exam: No swelling. Sensory   Monofilament testing: intact    Vascular  Capillary refills: < 3 seconds  The left DP pulse is 1+.      Assign Risk Category  No deformity present  No loss of protective sensation  No weak pulses  Risk: [facility] J, DO

## 2023-08-08 ENCOUNTER — APPOINTMENT (OUTPATIENT)
Age: 63
End: 2023-08-08

## 2023-08-08 ENCOUNTER — TELEPHONE (OUTPATIENT)
Age: 63
End: 2023-08-08

## 2023-08-08 NOTE — TELEPHONE ENCOUNTER
----- Message from Terrence Sam DO sent at 8/8/2023  9:09 AM EDT -----  [facility] eye screening was normal

## 2023-08-10 ENCOUNTER — OFFICE VISIT (OUTPATIENT)
Dept: OBGYN CLINIC | Facility: CLINIC | Age: 63
End: 2023-08-10
Payer: COMMERCIAL

## 2023-08-10 ENCOUNTER — TELEPHONE (OUTPATIENT)
Age: 63
End: 2023-08-10

## 2023-08-10 VITALS
DIASTOLIC BLOOD PRESSURE: 79 MMHG | HEART RATE: 82 BPM | BODY MASS INDEX: 35.99 KG/M2 | OXYGEN SATURATION: 95 % | SYSTOLIC BLOOD PRESSURE: 129 MMHG | WEIGHT: 223 LBS

## 2023-08-10 DIAGNOSIS — M17.12 PRIMARY OSTEOARTHRITIS OF LEFT KNEE: Primary | ICD-10-CM

## 2023-08-10 PROCEDURE — 99213 OFFICE O/P EST LOW 20 MIN: CPT | Performed by: FAMILY MEDICINE

## 2023-08-10 NOTE — TELEPHONE ENCOUNTER
Caller: Patient     Doctor: Jessica Magallanes    Reason for call: Patient is calling for a PT script for her left knee. She has to go to [facility] physical therapy.  She woll callback with the fax #    Call back#: [phone] Patient would like communication of their results via:        Cell Phone:   Telephone Information:   Mobile [phone]     Okay to leave a message containing results? Yes

## 2023-08-10 NOTE — PROGRESS NOTES
Subjective:    Chief Complaint   Patient presents with   • Left Knee - Pain, Follow-up   • Right Knee - Pain       Jenn Mcclain is a 60 y.o. female is presenting for follow-up visit in regards to left knee pain. Patient did have an MRI of the left knee that reveals moderate patellofemoral compartment cartilage loss and medial compartments cartilage thinning consistent with osteoarthritis. Patient was provided with a HA injection at the beginning of June. She states that pain symptoms have improved however she still gets soreness in the medial aspect of her knee specifically when walking downstairs. She states the pain is tolerable however does affect her day-to-day activity. She has tried over-the-counter compression sleeves however states that the sleeves and sometimes do not fit. She is scheduled to begin with physical therapy for the left knee. Pain symptoms are reported to be a 5 out of 10 in severity. The following portions of the patient's history were reviewed and updated as appropriate: allergies, current medications, past family history, past medical history, past social history, past surgical history and problem list.    Occupation:      Review of Systems        Objective:  /79   Pulse 82   Wt 101 kg (223 lb)   SpO2 95%   BMI 35.99 kg/m²   Skin: no rashes, lesions, skin discolorations, lacerations  Vasculature: normal popliteal and pedal pulse, normal skin color, normal capillary refill in extremity, no lower extremity edema  Neurologic: Neurologic exam is normal throughout lower extremities, Awake, alert, and oriented x3, no apparent distress. Musculoskeletal: left  KNEE EXAM  Gait: limping gait negative  Inspection:No erythema, no induration. There is no gross deformity. Palpation: Swelling: negative. Effusion: negative. Medial joint line TTP: positive  Lateral joint line TTP: negative  ROM: Full flexion and extension          Imaging:       Assessment/Plan:    1.  Primary osteoarthritis of left knee  Patient continues to remain symptomatic in regards to left knee osteoarthritis however has been improved slightly in regards to pain symptoms. She has last had cortisone injection in May of this past year and a Visco injection that was performed in June. I discussed that she would be eligible for repeat Visco injection in about 4 months however to provide her some relief until she can get Visco injection we can provide patient with cortisone injection in the interim- she would be eligible again in the next 4 to 6 weeks for repeat cortisone. She will schedule at that time for injection and for evaluation of the right knee which she states has been starting to bother her.

## 2023-08-10 NOTE — TELEPHONE ENCOUNTER
Caller: patient    Doctor: Antonette Mclaughlin    Reason for call: pt called back,please faxed PT order to  [facility] @ [phone]    Call back#: [phone]

## 2023-08-11 NOTE — TELEPHONE ENCOUNTER
Caller: Patient    Doctor: Dr. Nahum Zhou    Reason for call: Patient calling stating that [facility] does not accept her insurance. She is asking if the script for PT can be faxed to [facility] at the number below.     Fax: [phone]    Call back#:

## 2023-08-11 NOTE — TELEPHONE ENCOUNTER
PT order in per Dr Kenya Jenkins last office note faxed as requested and call to the patient to make her aware

## 2023-08-15 ENCOUNTER — TELEPHONE (OUTPATIENT)
Age: 63
End: 2023-08-15

## 2023-08-15 DIAGNOSIS — R42 POSTURAL DIZZINESS: ICD-10-CM

## 2023-08-15 DIAGNOSIS — R26.81 GAIT INSTABILITY: Primary | ICD-10-CM

## 2023-08-15 NOTE — TELEPHONE ENCOUNTER
Needs a script for PT for her balance for [facility] as insurance doesn't cover Ilya Frost.  Fax to [phone]

## 2023-08-16 ENCOUNTER — TELEPHONE (OUTPATIENT)
Age: 63
End: 2023-08-16

## 2023-08-16 NOTE — TELEPHONE ENCOUNTER
08/16/23  Screened by: Ade Weiss    Referring Provider DR. BOX    Pre- Screening: There is no height or weight on file to calculate BMI. Has patient been referred for a routine screening Colonoscopy? yes  Is the patient between 43-73 years old? yes      Previous Colonoscopy yes   If yes:    Date: 5yrs ago    Facility: [facility]    Reason: SCREENING      SCHEDULING STAFF: If the patient is between 45yrs-49yrs, please advise patient to confirm benefits/coverage with their insurance company for a routine screening colonoscopy, some insurance carriers will only cover at [address] [place] [address] or older. If the patient is over 66years old, please schedule an office visit. Does the patient want to see a Gastroenterologist prior to their procedure OR are they having any GI symptoms? NO    Has the patient been hospitalized or had abdominal surgery in the past 6 months? NO    Does the patient use supplemental oxygen? NO    Does the patient take Coumadin, Lovenox, Plavix, Elliquis, Xarelto, or other blood thinning medication? NO    Has the patient had a stroke, cardiac event, or stent placed in the past year? NO    PASSED OA    SCHEDULING STAFF: If patient answers NO to above questions, then schedule procedure. If patient answers YES to above questions, then schedule office appointment. If patient is between 45yrs - 49yrs, please advise patient that we will have to confirm benefits & coverage with their insurance company for a routine screening colonoscopy.

## 2023-08-16 NOTE — TELEPHONE ENCOUNTER
Scheduled date of colonoscopy (as of today): 9/6/2023  Physician performing colonoscopy: DR. WILEDR  Location of colonoscopy: [facility]   Bowel prep reviewed with patient: GOLYTELY

## 2023-08-16 NOTE — TELEPHONE ENCOUNTER
[address] [facility],[address] Assessment    Name: Manpreet Osei  YOB: 1960  Last Height: 5' 6" (1.676 m)  Last weight: 101 kg (223 lb)  BMI: 35.99 kg/m²  Procedure: COLONOSCOPY  Diagnosis: SCREENING  Date of procedure: 9/6/2023  Prep: DIANA  Responsible :  PATIENT TO  WITH  INFO  Phone#:  [phone]  Name completing form: Theresa Loyola  Date form completed: 08/16/23      If the patient answers yes to any of these questions, schedule in a hospital  Are you pregnant: No  Do you rely on a wheelchair for mobility: No  Have you been diagnosed with End Stage Renal Disease (ESRD): No  Do you need oxygen during the day: No  Have you had a heart attack or stroke within the past three months: No  Have you had a seizure within the past three months: No  Have you ever been informed by anesthesia that you have a difficult airway: Yes (Comment: This patient should be scheduled in the hospital)  Additional Questions  Have you had any cardiac testing or are under the care of a Cardiologist (see cardiac list): No  Cardiac list:   Do you have an implanted cardiac defibrillator: No (Comment:  This patient should be scheduled in the hospital)    Have any bleeding problems, such as anemia or hemophilia (If patient has H&H result below 8, schedule in hospital.  H&H must be within 30 days of procedure): No    Had an organ transplant within the past 3 months: No    Do you have any present infections: No  Do you get short of breath when walking a few blocks: No  Have you been diagnosed with diabetes: Yes  Comments (provide cardiac provider information if applicable):

## 2023-08-18 ENCOUNTER — TELEPHONE (OUTPATIENT)
Age: 63
End: 2023-08-18

## 2023-08-18 NOTE — TELEPHONE ENCOUNTER
Caller: Patient    Doctor: Breonna Jiménez    Reason for call: Patient wasn't happy w/treament @ PT. Request a script for PT be faxed [facility] in [address] [place], [place] [address]#[phone]. Patient has appt 8/22 1pm. Patient asked if balance could be added to treatment plan? Felt she had vertigo when doing previous treatment. Please contact patient to confirm script faxed.     Call back#: [phone]

## 2023-08-21 ENCOUNTER — OFFICE VISIT (OUTPATIENT)
Age: 63
End: 2023-08-21
Payer: COMMERCIAL

## 2023-08-21 VITALS
BODY MASS INDEX: 35.7 KG/M2 | WEIGHT: 221.2 LBS | OXYGEN SATURATION: 98 % | RESPIRATION RATE: 18 BRPM | DIASTOLIC BLOOD PRESSURE: 68 MMHG | SYSTOLIC BLOOD PRESSURE: 102 MMHG | TEMPERATURE: 97.8 F | HEART RATE: 75 BPM

## 2023-08-21 DIAGNOSIS — J45.20 MILD INTERMITTENT ASTHMA WITHOUT COMPLICATION: ICD-10-CM

## 2023-08-21 DIAGNOSIS — R07.89 CHEST HEAVINESS: Primary | ICD-10-CM

## 2023-08-21 DIAGNOSIS — R09.89 CHEST CONGESTION: ICD-10-CM

## 2023-08-21 PROCEDURE — 99213 OFFICE O/P EST LOW 20 MIN: CPT | Performed by: FAMILY MEDICINE

## 2023-08-21 PROCEDURE — 93000 ELECTROCARDIOGRAM COMPLETE: CPT | Performed by: FAMILY MEDICINE

## 2023-08-21 RX ORDER — ALBUTEROL SULFATE 90 UG/1
2 AEROSOL, METERED RESPIRATORY (INHALATION) EVERY 6 HOURS PRN
Qty: 18 G | Refills: 5 | Status: SHIPPED | OUTPATIENT
Start: 2023-08-21

## 2023-08-21 RX ORDER — CLONAZEPAM 1 MG/1
TABLET ORAL
COMMUNITY
Start: 2023-08-14

## 2023-08-21 NOTE — PROGRESS NOTES
Name: Kevin Webb      :       MRN: [id number]  Encounter Provider: Fidencio Phipps DO  Encounter Date: 2023   Encounter department: [address]     1. Chest heaviness  -     POCT ECG    2. Chest congestion  -     dextromethorphan-guaifenesin (MUCINEX DM)  MG per 12 hr tablet; Take 1 tablet by mouth every 12 (twelve) hours    3. Mild intermittent asthma without complication  -     albuterol (Ventolin HFA) 90 mcg/act inhaler; Inhale 2 puffs every 6 (six) hours as needed for wheezing    covid testing negative. ecg was unchagned when compared to 3/2023 ecg. asthma related dyspnea vs viral lower resp infection. Supportive therapy encouraged Albuterol therapy given       Subjective      Onset Friday. Started with scratchy throat. Saturday night had chest heaviness and mild dyspnea. Dry cough. Reports shaking chills Saturday night. No diaphoresis. Has albuterol but it is . Took home Covid test Friday and  both were negative. Review of Systems   Constitutional: Positive for fatigue. Negative for fever. HENT: Positive for sore throat (mild). Negative for congestion (chest ), rhinorrhea and sinus pain. Respiratory: Positive for cough and chest tightness. Negative for wheezing. Cardiovascular: Negative for chest pain and leg swelling.        Current Outpatient Medications on File Prior to Visit   Medication Sig   • amphetamine-dextroamphetamine (ADDERALL XR) 10 MG 24 hr capsule Take 10 mg by mouth   • clonazePAM (KlonoPIN) 0.125 mg disintegrating tablet Apply 0.125 mg to cheek 2 (two) times a day as needed   • clonazePAM (KlonoPIN) 1 mg tablet TAKE 1 TABLET BY ORAL ROUTE 5 TIMES PER DAY   • Continuous Blood Gluc Sensor (FreeStyle Zully 3 Sensor) MISC 1 DEVICE SUBCUT EVERY 2 WEEKS FOR 1 MONTH(S)   • Durolane 60 MG/3ML injection    • ergocalciferol (VITAMIN D2) 50,000 units    • gabapentin (NEURONTIN) 100 mg capsule Take 300 mg by mouth Three times a day Once a a day   • gabapentin (NEURONTIN) 300 mg capsule    • levothyroxine 100 mcg tablet Take 1 tablet (100 mcg total) by mouth daily   • liothyronine (CYTOMEL) 25 mcg tablet    • lithium 300 MG tablet Take 1,200 mg by mouth   • meclizine (ANTIVERT) 25 mg tablet Take 1 tablet (25 mg total) by mouth 3 (three) times a day as needed for dizziness   • Ozempic, 0.25 or 0.5 MG/DOSE, 2 MG/1.5ML injection pen    • prazosin (MINIPRESS) 2 mg capsule    • QUEtiapine (SEROquel XR) 50 mg TAKE 1 TABLET BY MOUTH EVERY EVENING WITHOUT FOOD OR WITH LIGHT MEAL   • traZODone (DESYREL) 100 mg tablet Take 100 mg by mouth   • traZODone (DESYREL) 50 mg tablet Take by mouth       Objective     /68 (BP Location: Left arm, Patient Position: Sitting, Cuff Size: Standard)   Pulse 75   Temp 97.8 °F (36.6 °C) (Temporal)   Resp 18   Wt 100 kg (221 lb 3.2 oz)   SpO2 98%   BMI 35.70 kg/m²     Physical Exam  HENT:      Head: Normocephalic and atraumatic. Nose: No congestion or rhinorrhea. Mouth/Throat:      Mouth: Mucous membranes are dry. Pharynx: Oropharynx is clear. Posterior oropharyngeal erythema (mild) present. Cardiovascular:      Rate and Rhythm: Normal rate and regular rhythm. Heart sounds: No murmur heard. Pulmonary:      Effort: Pulmonary effort is normal.      Breath sounds: Normal breath sounds. Neurological:      Mental Status: She is alert.        Edi Sotelo DO

## 2023-08-23 ENCOUNTER — TELEPHONE (OUTPATIENT)
Age: 63
End: 2023-08-23

## 2023-08-23 DIAGNOSIS — R09.89 CHEST CONGESTION: Primary | ICD-10-CM

## 2023-08-23 RX ORDER — AZITHROMYCIN 250 MG/1
TABLET, FILM COATED ORAL
Qty: 6 TABLET | Refills: 0 | Status: SHIPPED | OUTPATIENT
Start: 2023-08-23 | End: 2023-08-28

## 2023-08-23 NOTE — TELEPHONE ENCOUNTER
Pt was seen on Monday 8/21/2023 with [place] DX was lower respitory viral infection. Pt is not feeling better. Please call in RX for [address] Brian OJEDA.    Call pt when done [phone]

## 2023-08-29 LAB
ALBUMIN SERPL-MCNC: 4.1 G/DL (ref 3.9–4.9)
ALBUMIN/CREAT UR: 63 MG/G CREAT (ref 0–29)
ALBUMIN/GLOB SERPL: 1.6 {RATIO} (ref 1.2–2.2)
ALP SERPL-CCNC: 55 IU/L (ref 44–121)
ALT SERPL-CCNC: 12 IU/L (ref 0–32)
AST SERPL-CCNC: 12 IU/L (ref 0–40)
BASOPHILS # BLD AUTO: 0.1 X10E3/UL (ref 0–0.2)
BASOPHILS NFR BLD AUTO: 1 %
BILIRUB SERPL-MCNC: 0.7 MG/DL (ref 0–1.2)
BUN SERPL-MCNC: 9 MG/DL (ref 8–27)
BUN/CREAT SERPL: 10 (ref 12–28)
CALCIUM SERPL-MCNC: 9.3 MG/DL (ref 8.7–10.3)
CHLORIDE SERPL-SCNC: 103 MMOL/L (ref 96–106)
CO2 SERPL-SCNC: 23 MMOL/L (ref 20–29)
CREAT SERPL-MCNC: 0.87 MG/DL (ref 0.57–1)
CREAT UR-MCNC: 58.2 MG/DL
EGFR: 75 ML/MIN/1.73
EOSINOPHIL # BLD AUTO: 0.4 X10E3/UL (ref 0–0.4)
EOSINOPHIL NFR BLD AUTO: 5 %
ERYTHROCYTE [DISTWIDTH] IN BLOOD BY AUTOMATED COUNT: 13.3 % (ref 11.7–15.4)
GLOBULIN SER-MCNC: 2.6 G/DL (ref 1.5–4.5)
GLUCOSE SERPL-MCNC: 143 MG/DL (ref 70–99)
HBA1C MFR BLD: 5.7 % (ref 4.8–5.6)
HCT VFR BLD AUTO: 45.8 % (ref 34–46.6)
HGB BLD-MCNC: 15.1 G/DL (ref 11.1–15.9)
IMM GRANULOCYTES # BLD: 0 X10E3/UL (ref 0–0.1)
IMM GRANULOCYTES NFR BLD: 0 %
LITHIUM SERPL-SCNC: 1.2 MMOL/L (ref 0.5–1.2)
LYMPHOCYTES # BLD AUTO: 2.5 X10E3/UL (ref 0.7–3.1)
LYMPHOCYTES NFR BLD AUTO: 30 %
MCH RBC QN AUTO: 31.7 PG (ref 26.6–33)
MCHC RBC AUTO-ENTMCNC: 33 G/DL (ref 31.5–35.7)
MCV RBC AUTO: 96 FL (ref 79–97)
MICROALBUMIN UR-MCNC: 36.7 UG/ML
MONOCYTES # BLD AUTO: 0.6 X10E3/UL (ref 0.1–0.9)
MONOCYTES NFR BLD AUTO: 7 %
NEUTROPHILS # BLD AUTO: 4.7 X10E3/UL (ref 1.4–7)
NEUTROPHILS NFR BLD AUTO: 57 %
PLATELET # BLD AUTO: 224 X10E3/UL (ref 150–450)
POTASSIUM SERPL-SCNC: 4.2 MMOL/L (ref 3.5–5.2)
PROT SERPL-MCNC: 6.7 G/DL (ref 6–8.5)
RBC # BLD AUTO: 4.76 X10E6/UL (ref 3.77–5.28)
SODIUM SERPL-SCNC: 138 MMOL/L (ref 134–144)
TSH SERPL DL<=0.005 MIU/L-ACNC: 1.05 UIU/ML (ref 0.45–4.5)
WBC # BLD AUTO: 8.3 X10E3/UL (ref 3.4–10.8)

## 2023-08-30 ENCOUNTER — TELEPHONE (OUTPATIENT)
Age: 63
End: 2023-08-30

## 2023-08-30 NOTE — TELEPHONE ENCOUNTER
Patients GI provider:  Dr. Silas Maza    Number to return call: [phone]    Reason for call: Pt called and has some questions regarding her prep, she stated she is not able to find 64oz Gatorade, and also she said because she is diabetic she cant be drinking all that sugar, please review and reach out to see if there is another option she can do for prep?  Thank you     Scheduled procedure/appointment date if applicable: procedure 60/43/8411

## 2023-08-31 ENCOUNTER — TELEPHONE (OUTPATIENT)
Age: 63
End: 2023-08-31

## 2023-08-31 NOTE — TELEPHONE ENCOUNTER
----- Message from Emil Proctor DO sent at 8/31/2023 10:17 AM EDT -----  Overall blood work is ok.  Blood sugars are a little high but a1c has improved to 5.7  Lithium level is normal

## 2023-08-31 NOTE — TELEPHONE ENCOUNTER
called patient. And was notified and is wondering about her albumin creat/ratio, and is in deep depression and is asking for referral form you  Looking for a jon that is close , did mention to her about samira.

## 2023-08-31 NOTE — TELEPHONE ENCOUNTER
Called and spoke to patient.gave patient message as per Artur.  Patient voiced understanding and had no further questions or concerns

## 2023-09-01 ENCOUNTER — TELEPHONE (OUTPATIENT)
Age: 63
End: 2023-09-01

## 2023-09-01 NOTE — TELEPHONE ENCOUNTER
FYI    Sister called to see if patient was admitted to hospital - apparently she called and what sister could make out she was going to an ER due to depression. Nothing in patients chart through [facility]. Location is unknown at this time.

## 2023-09-01 NOTE — TELEPHONE ENCOUNTER
I left a voice mail for augusto sister doctor jong does not know if augusto went to the er for depression

## 2023-09-14 ENCOUNTER — TELEPHONE (OUTPATIENT)
Age: 63
End: 2023-09-14

## 2023-09-14 NOTE — TELEPHONE ENCOUNTER
Scheduled date of colonoscopy (as of today):10/17/2023  Physician performing colonoscopy: Dr Hui Rachel  Location of colonoscopy:[place]  Bowel prep reviewed with patient:miralax/dul

## 2023-09-15 ENCOUNTER — TELEPHONE (OUTPATIENT)
Dept: NEPHROLOGY | Facility: CLINIC | Age: 63
End: 2023-09-15

## 2023-09-15 ENCOUNTER — TELEPHONE (OUTPATIENT)
Age: 63
End: 2023-09-15

## 2023-09-15 DIAGNOSIS — R80.9 MICROALBUMINURIA: Primary | ICD-10-CM

## 2023-09-15 DIAGNOSIS — Z90.5 ABSENT KIDNEY: ICD-10-CM

## 2023-09-15 NOTE — TELEPHONE ENCOUNTER
Called patient to informed her she is to schedule appointment with Nephrology not Urology. Patient is aware and she will contact her family doctor for referral to nephrology.

## 2023-09-15 NOTE — TELEPHONE ENCOUNTER
Looks like pt self referred. I will place a referral to NEPHROLOGY. That's who she needs to see if she has concerns. However based on labs her overall renal function is stable.

## 2023-09-15 NOTE — TELEPHONE ENCOUNTER
Haydee Buregr from Nephrology called. Wanted to double check appt and referral??    Was notified by Urology that patient scheduled a Urology appt  It is scheduled for 9/18.    "elevated Albumin / creatinine urine ratio/ pt has one kidney"    Was the appt supposed to be w/ Nephrology or Urology? No referral place in patients chart for either.     Please advise: [phone] Haydee Burger or Carlos Parent w/ Nephrology)    Please call so they know whether or not to cancel patients appt w/ Urology on 9/18/23

## 2023-09-15 NOTE — TELEPHONE ENCOUNTER
Patient called Urology Department and schedule appointment. Appointment and patient chart was review by provider Lily Thakkar PA-C and spoke to Trina Maradiaga (MR) and stated that the patient really needs to be seen by Nephrology. HANNA Ruff) called Dr. Nik Panda (PCP) office and spoke to Og Bass (MR) and explained that the patient has an appointment with Urology but may need to be seen by Nephrology. Rudy (MR) at Dr. Nik Panda (PCP) office stated that she will place a note in the system for Dr. Nik Panda to review the chart to see if it is Urology the patient needs to see or if it is Nephrology. Referral will be placed in the system for the proper department.

## 2023-09-18 ENCOUNTER — APPOINTMENT (OUTPATIENT)
Dept: RADIOLOGY | Facility: CLINIC | Age: 63
End: 2023-09-18
Payer: COMMERCIAL

## 2023-09-18 ENCOUNTER — OFFICE VISIT (OUTPATIENT)
Dept: OBGYN CLINIC | Facility: CLINIC | Age: 63
End: 2023-09-18
Payer: COMMERCIAL

## 2023-09-18 VITALS
HEIGHT: 66 IN | HEART RATE: 75 BPM | DIASTOLIC BLOOD PRESSURE: 71 MMHG | WEIGHT: 220 LBS | BODY MASS INDEX: 35.36 KG/M2 | OXYGEN SATURATION: 99 % | SYSTOLIC BLOOD PRESSURE: 110 MMHG

## 2023-09-18 DIAGNOSIS — M17.0 PRIMARY OSTEOARTHRITIS OF BOTH KNEES: Primary | ICD-10-CM

## 2023-09-18 DIAGNOSIS — M25.561 RIGHT KNEE PAIN, UNSPECIFIED CHRONICITY: ICD-10-CM

## 2023-09-18 PROCEDURE — 20610 DRAIN/INJ JOINT/BURSA W/O US: CPT | Performed by: FAMILY MEDICINE

## 2023-09-18 PROCEDURE — 99214 OFFICE O/P EST MOD 30 MIN: CPT | Performed by: FAMILY MEDICINE

## 2023-09-18 PROCEDURE — 73564 X-RAY EXAM KNEE 4 OR MORE: CPT

## 2023-09-18 RX ORDER — BUPIVACAINE HYDROCHLORIDE 2.5 MG/ML
4 INJECTION, SOLUTION INFILTRATION; PERINEURAL
Status: COMPLETED | OUTPATIENT
Start: 2023-09-18 | End: 2023-09-18

## 2023-09-18 RX ORDER — METHYLPREDNISOLONE ACETATE 40 MG/ML
1 INJECTION, SUSPENSION INTRA-ARTICULAR; INTRALESIONAL; INTRAMUSCULAR; SOFT TISSUE
Status: COMPLETED | OUTPATIENT
Start: 2023-09-18 | End: 2023-09-18

## 2023-09-18 RX ADMIN — BUPIVACAINE HYDROCHLORIDE 4 ML: 2.5 INJECTION, SOLUTION INFILTRATION; PERINEURAL at 10:15

## 2023-09-18 RX ADMIN — METHYLPREDNISOLONE ACETATE 1 ML: 40 INJECTION, SUSPENSION INTRA-ARTICULAR; INTRALESIONAL; INTRAMUSCULAR; SOFT TISSUE at 10:15

## 2023-09-18 NOTE — PROGRESS NOTES
Subjective:    Chief Complaint   Patient presents with   • Left Knee - Follow-up, Pain     Cortizone   • Right Knee - Follow-up, Pain       Vanessa Saldivar is a 60 y.o. female presenting for follow-up visit in regards to bilateral knee pain. Patient has been following for ongoing left knee pain that was treated with cortisone injection and Visco injections in the past.  Patient is scheduled to have repeat Visco injections in approximately 2 months however was having recurrence of pain symptoms and we had opted to have her come in today for cortisone injection to provide leaf until Visco injection can be provided. She states that her pain symptoms have improved as she had been in the hospital recently and has been off of legs. Now has recurrence of symptoms that she is walking up and down stairs. She states that she has been having similar issues in the right knee which have been worse since that she is favoring left side. The following portions were reviewed and updated as needed: allergies, current medications, past medical history, past social history, past surgical history and problem list.    Review of Systems   Constitutional: Negative for fever. HENT: Negative for dental problem and headaches. Eyes: Negative for vision loss. Respiratory: Negative for cough and shortness of breath. Cardiovascular: Negative for leg swelling and palpitations. Gastrointestinal: Negative for constipation and diarrhea. Genitourinary: Negative for bladder incontinence and difficulty urinating. Musculoskeletal: Negative for back pain and difficulty walking. Skin: Negative for rash and ulcer. Neurological: Negative for dizziness and headaches. Hem/Lymph/Immuno: Negative for blood clots. Does not bruise/bleed easily. Psychiatric/Behavioral: Negative for confusion.          Objective:  General: no acute distress, non toxic, AAO x3   Skin: no skin changes, no rashes, no wounds or laceration  Vasculature: normal cap refill, no LE edema, normal popliteal and dorsalis pedis pulse  Neurologic:   Musculoskeletal: Bilateral KNEE EXAM  Gait: limping gait negative, able to weight bear without difficulty  Inspection: No gross deformity, no redness or warmth   Effusion: Negative   Medial joint line TTP: Positive  Lateral joint line TTP: Positive  ROM: Full flexion and extension  Akshat's: negative,   Instability to varus/valgus stress: negative  Anterior Drawer: negative   Lachman's test: negative  Posterior Drawer: negative            Imaging:       Assessment/Plan:  1. Primary osteoarthritis of both knees  X-rays of the right knee were reviewed in office today independently. Moderate degree of degenerative changes were appreciated. Follow-up with official radiology report. Clinical impression is that patient's right knee pain is due to exacerbation of underlying knee osteoarthritis and due to compensation from favoring the left knee. Discussed treatment options for knee osteoarthritis including activity modification, physical therapy, anti-inflammatory medication and consideration for cortisone injection. After discussion patient would like to proceed with cortisone injection for bilateral knees. CSI performed in office today without complication. She will contact me again in about 2 months through [address] [place] [address] to let me know about her knee pain symptoms and we will consider resubmitting for Visco injection. Large joint arthrocentesis: bilateral knee  Universal Protocol:  Consent: Verbal consent obtained.   Risks and benefits: risks, benefits and alternatives were discussed  Consent given by: patient    Supporting Documentation  Indications: pain   Procedure Details  Location: knee - bilateral knee  Preparation: Patient was prepped and draped in the usual sterile fashion  Needle size: 22 G  Approach: anterolateral    Medications (Right): 4 mL bupivacaine 0.25 %; 1 mL methylPREDNISolone acetate 40 mg/mLMedications (Left): 4 mL bupivacaine 0.25 %; 1 mL methylPREDNISolone acetate 40 mg/mL   Patient tolerance: patient tolerated the procedure well with no immediate complications    Risks and benefits of CSI were discussed with patient extensively. Risks were highlighted which included but were not limited to infection, pain, local site swelling, and chance that injection may not be effective. Patient was also counseled regarding glucose elevation days after receiving CSI and to be mindful of diet and check sugars daily. Patient agreeable to proceed with CSI after counseling.             - XR knee 4+ vw right injury;  Future

## 2023-09-26 ENCOUNTER — OFFICE VISIT (OUTPATIENT)
Age: 63
End: 2023-09-26
Payer: COMMERCIAL

## 2023-09-26 VITALS
DIASTOLIC BLOOD PRESSURE: 78 MMHG | HEART RATE: 83 BPM | RESPIRATION RATE: 20 BRPM | WEIGHT: 220.4 LBS | TEMPERATURE: 97.5 F | BODY MASS INDEX: 35.57 KG/M2 | SYSTOLIC BLOOD PRESSURE: 110 MMHG | OXYGEN SATURATION: 98 %

## 2023-09-26 DIAGNOSIS — G25.71 AKATHISIA: ICD-10-CM

## 2023-09-26 DIAGNOSIS — D58.2 ELEVATED HEMOGLOBIN (HCC): ICD-10-CM

## 2023-09-26 DIAGNOSIS — F15.93 WITHDRAWAL FROM OTHER STIMULANT DRUG (HCC): Primary | ICD-10-CM

## 2023-09-26 DIAGNOSIS — R71.8 ELEVATED HEMATOCRIT: ICD-10-CM

## 2023-09-26 PROCEDURE — 99214 OFFICE O/P EST MOD 30 MIN: CPT | Performed by: FAMILY MEDICINE

## 2023-09-26 RX ORDER — MIRTAZAPINE 15 MG/1
15 TABLET, FILM COATED ORAL
COMMUNITY
Start: 2023-09-22 | End: 2024-09-21

## 2023-09-26 NOTE — PROGRESS NOTES
Name: Conchita Blankenship      : [birth year]      MRN: [id number]  Encounter Provider: Gayla Vegas DO  Encounter Date: 2023   Encounter department: [address]     1. Withdrawal from other stimulant drug (HCC)-off of antipsychotic. Vital stable. will follow-up with psychiatry soon. -     Comprehensive metabolic panel; Future    2. Elevated hemoglobin (HCC)  3. Elevated hematocrit-Pt was hostpilizsed in a behavioral unit on end of august this coinsides with elevatedin h/h which was previously normal .  Obtain follow-up studies to monitor for resolution vs persistence. -     Peripheral Smear; Future  -     CBC and Platelet; Future    4. Akathisia-persistent patient to [facility] and she reports mild improvement. She will continue to follow-up with psychiatry. -     Comprehensive metabolic panel; Future        Depression Screening and Follow-up Plan: Patient was screened for depression during today's encounter. They screened negative with a PHQ-2 score of 0. Subjective       Started on ruxalti in the hostpial stay 0.5   At discharge increased to 1mg and akathesia started soon after   Has since stopped medication. The involuntary movment has imporved some.          Current Outpatient Medications on File Prior to Visit   Medication Sig    albuterol (Ventolin HFA) 90 mcg/act inhaler Inhale 2 puffs every 6 (six) hours as needed for wheezing    amphetamine-dextroamphetamine (ADDERALL XR) 10 MG 24 hr capsule Take 10 mg by mouth    clonazePAM (KlonoPIN) 1 mg tablet TAKE 1 TABLET BY ORAL ROUTE 5 TIMES PER DAY    Durolane 60 MG/3ML injection     ergocalciferol (VITAMIN D2) 50,000 units     gabapentin (NEURONTIN) 300 mg capsule     levothyroxine 100 mcg tablet Take 1 tablet (100 mcg total) by mouth daily    liothyronine (CYTOMEL) 25 mcg tablet     lithium 300 MG tablet Take 1,200 mg by mouth    meclizine (ANTIVERT) 25 mg tablet Take 1 tablet (25 mg total) by mouth 3 (three) times a day as needed for dizziness    mirtazapine (REMERON) 15 mg tablet Take 15 mg by mouth (Patient not taking: Reported on 10/11/2023)    Ozempic, 0.25 or 0.5 MG/DOSE, 2 MG/1.5ML injection pen     prazosin (MINIPRESS) 2 mg capsule     traZODone (DESYREL) 100 mg tablet Take 100 mg by mouth    traZODone (DESYREL) 50 mg tablet Take by mouth    dextromethorphan-guaifenesin (MUCINEX DM)  MG per 12 hr tablet Take 1 tablet by mouth every 12 (twelve) hours       Objective     /78 (BP Location: Left arm, Patient Position: Sitting, Cuff Size: Standard)   Pulse 83   Temp 97.5 °F (36.4 °C) (Temporal)   Resp 20   Wt 100 kg (220 lb 6.4 oz)   SpO2 98%   BMI 35.57 kg/m²     Physical Exam  HENT:      Head: Normocephalic. Eyes:      Conjunctiva/sclera: Conjunctivae normal.   Cardiovascular:      Rate and Rhythm: Normal rate. Pulmonary:      Effort: Pulmonary effort is normal.   Musculoskeletal:      Right lower leg: No edema. Left lower leg: No edema. Neurological:      Mental Status: She is alert and oriented to person, place, and time. Motor: Tremor present. Coordination: Coordination abnormal.   Psychiatric:         Mood and Affect: Affect is flat. Behavior: Behavior is cooperative.        Mery Christine,

## 2023-09-27 ENCOUNTER — HOSPITAL ENCOUNTER (OUTPATIENT)
Dept: NON INVASIVE DIAGNOSTICS | Facility: CLINIC | Age: 63
Discharge: HOME | End: 2023-09-27
Payer: COMMERCIAL

## 2023-09-27 VITALS
SYSTOLIC BLOOD PRESSURE: 110 MMHG | HEART RATE: 83 BPM | HEIGHT: 66 IN | WEIGHT: 220 LBS | BODY MASS INDEX: 35.36 KG/M2 | DIASTOLIC BLOOD PRESSURE: 78 MMHG

## 2023-09-27 DIAGNOSIS — Q24.8 ABNORMAL CARDIAC VALVE: ICD-10-CM

## 2023-09-27 LAB
AORTIC ROOT: 3.1 CM
APICAL FOUR CHAMBER EJECTION FRACTION: 69 %
ASCENDING AORTA: 3.5 CM
E WAVE DECELERATION TIME: 202 MS
FRACTIONAL SHORTENING: 33 % (ref 28–44)
INTERVENTRICULAR SEPTUM IN DIASTOLE (PARASTERNAL SHORT AXIS VIEW): 0.9 CM
INTERVENTRICULAR SEPTUM: 0.9 CM (ref 0.6–1.1)
LAAS-AP2: 15.1 CM2
LAAS-AP4: 14.7 CM2
LEFT ATRIUM SIZE: 3.2 CM
LEFT ATRIUM VOLUME (MOD BIPLANE): 38 ML
LEFT INTERNAL DIMENSION IN SYSTOLE: 3 CM (ref 2.1–4)
LEFT VENTRICULAR INTERNAL DIMENSION IN DIASTOLE: 4.5 CM (ref 3.5–6)
LEFT VENTRICULAR POSTERIOR WALL IN END DIASTOLE: 1.1 CM
LEFT VENTRICULAR STROKE VOLUME: 61 ML
LVSV (TEICH): 61 ML
MV E'TISSUE VEL-SEP: 8 CM/S
MV PEAK A VEL: 0.67 M/S
MV PEAK E VEL: 53 CM/S
MV STENOSIS PRESSURE HALF TIME: 59 MS
MV VALVE AREA P 1/2 METHOD: 3.73 CM2
RIGHT ATRIUM AREA SYSTOLE A4C: 14.3 CM2
RIGHT VENTRICLE ID DIMENSION: 3.1 CM
SL CV LEFT ATRIUM LENGTH A2C: 5 CM
SL CV LV EF: 60
SL CV PED ECHO LEFT VENTRICLE DIASTOLIC VOLUME (MOD BIPLANE) 2D: 95 ML
SL CV PED ECHO LEFT VENTRICLE SYSTOLIC VOLUME (MOD BIPLANE) 2D: 34 ML
TR MAX PG: 20 MMHG
TR PEAK VELOCITY: 2.2 M/S
TRICUSPID ANNULAR PLANE SYSTOLIC EXCURSION: 1.8 CM
TRICUSPID VALVE PEAK REGURGITATION VELOCITY: 2.24 M/S

## 2023-09-27 PROCEDURE — 93306 TTE W/DOPPLER COMPLETE: CPT | Performed by: INTERNAL MEDICINE

## 2023-09-27 PROCEDURE — 93306 TTE W/DOPPLER COMPLETE: CPT

## 2023-09-30 LAB
ALBUMIN SERPL-MCNC: 4.1 G/DL (ref 3.9–4.9)
ALBUMIN/GLOB SERPL: 1.4 {RATIO} (ref 1.2–2.2)
ALP SERPL-CCNC: 64 IU/L (ref 44–121)
ALT SERPL-CCNC: 13 IU/L (ref 0–32)
AST SERPL-CCNC: 12 IU/L (ref 0–40)
BASOPHILS # BLD AUTO: 0.1 X10E3/UL (ref 0–0.2)
BASOPHILS NFR BLD AUTO: 1 %
BILIRUB SERPL-MCNC: 0.4 MG/DL (ref 0–1.2)
BUN SERPL-MCNC: 12 MG/DL (ref 8–27)
BUN/CREAT SERPL: 13 (ref 12–28)
CALCIUM SERPL-MCNC: 9.2 MG/DL (ref 8.7–10.3)
CHLORIDE SERPL-SCNC: 103 MMOL/L (ref 96–106)
CO2 SERPL-SCNC: 23 MMOL/L (ref 20–29)
CREAT SERPL-MCNC: 0.9 MG/DL (ref 0.57–1)
EGFR: 71 ML/MIN/1.73
EOSINOPHIL # BLD AUTO: 0.4 X10E3/UL (ref 0–0.4)
EOSINOPHIL NFR BLD AUTO: 4 %
ERYTHROCYTE [DISTWIDTH] IN BLOOD BY AUTOMATED COUNT: 13 % (ref 11.7–15.4)
GLOBULIN SER-MCNC: 3 G/DL (ref 1.5–4.5)
GLUCOSE SERPL-MCNC: 129 MG/DL (ref 70–99)
HCT VFR BLD AUTO: 44.4 % (ref 34–46.6)
HGB BLD-MCNC: 14.9 G/DL (ref 11.1–15.9)
IMM GRANULOCYTES # BLD: 0 X10E3/UL (ref 0–0.1)
IMM GRANULOCYTES NFR BLD: 0 %
LYMPHOCYTES # BLD AUTO: 2.7 X10E3/UL (ref 0.7–3.1)
LYMPHOCYTES NFR BLD AUTO: 27 %
MCH RBC QN AUTO: 31.4 PG (ref 26.6–33)
MCHC RBC AUTO-ENTMCNC: 33.6 G/DL (ref 31.5–35.7)
MCV RBC AUTO: 94 FL (ref 79–97)
MONOCYTES # BLD AUTO: 0.8 X10E3/UL (ref 0.1–0.9)
MONOCYTES NFR BLD AUTO: 7 %
NEUTROPHILS # BLD AUTO: 6.2 X10E3/UL (ref 1.4–7)
NEUTROPHILS NFR BLD AUTO: 61 %
PATH REV BLD -IMP: NORMAL
PATHOLOGIST NAME: NORMAL
PLATELET # BLD AUTO: 224 X10E3/UL (ref 150–450)
POTASSIUM SERPL-SCNC: 4 MMOL/L (ref 3.5–5.2)
PROT SERPL-MCNC: 7.1 G/DL (ref 6–8.5)
RBC # BLD AUTO: 4.75 X10E6/UL (ref 3.77–5.28)
SODIUM SERPL-SCNC: 138 MMOL/L (ref 134–144)
WBC # BLD AUTO: 10.1 X10E3/UL (ref 3.4–10.8)

## 2023-10-10 ENCOUNTER — TELEPHONE (OUTPATIENT)
Dept: GASTROENTEROLOGY | Facility: CLINIC | Age: 63
End: 2023-10-10

## 2023-10-10 NOTE — TELEPHONE ENCOUNTER
Pt called to r/s colonoscopy. Colonoscopy r/s'd for 12/1 w/ Dr. Theodore Paredes Pt has instructions.

## 2023-10-11 ENCOUNTER — AMB VIDEO VISIT (OUTPATIENT)
Dept: OTHER | Facility: HOSPITAL | Age: 63
End: 2023-10-11

## 2023-10-11 ENCOUNTER — OFFICE VISIT (OUTPATIENT)
Age: 63
End: 2023-10-11
Payer: COMMERCIAL

## 2023-10-11 VITALS
SYSTOLIC BLOOD PRESSURE: 110 MMHG | RESPIRATION RATE: 18 BRPM | WEIGHT: 220.4 LBS | BODY MASS INDEX: 35.57 KG/M2 | OXYGEN SATURATION: 98 % | HEART RATE: 60 BPM | TEMPERATURE: 97.7 F | DIASTOLIC BLOOD PRESSURE: 68 MMHG

## 2023-10-11 DIAGNOSIS — F32.A DEPRESSION, UNSPECIFIED DEPRESSION TYPE: Primary | ICD-10-CM

## 2023-10-11 DIAGNOSIS — R71.8 ELEVATED HEMATOCRIT: ICD-10-CM

## 2023-10-11 DIAGNOSIS — G25.71 AKATHISIA: Primary | ICD-10-CM

## 2023-10-11 DIAGNOSIS — D58.2 ELEVATED HEMOGLOBIN (HCC): ICD-10-CM

## 2023-10-11 DIAGNOSIS — F33.2 SEVERE EPISODE OF RECURRENT MAJOR DEPRESSIVE DISORDER, WITHOUT PSYCHOTIC FEATURES (HCC): ICD-10-CM

## 2023-10-11 DIAGNOSIS — E87.1 HYPONATREMIA: ICD-10-CM

## 2023-10-11 PROCEDURE — 99214 OFFICE O/P EST MOD 30 MIN: CPT | Performed by: FAMILY MEDICINE

## 2023-10-11 PROCEDURE — ECARE PR SL URGENT CARE VIRTUAL VISIT: Performed by: PHYSICIAN ASSISTANT

## 2023-10-11 NOTE — PROGRESS NOTES
Name: Se Brothers      :       MRN: [id number]  Encounter Provider: Kavin Dumont DO  Encounter Date: 10/11/2023   Encounter department: [address]     1. Akathisia-unchanged. No longer on Cogentin because it did not make a significant difference. Psychiatrist is considering propanolol. Patient's blood pressures are low normal range. Given starting with daily dosing. Patient has BP cuff at home. Encouraged to monitor. Patient would like to establish with neurology for further evaluation.  -     Ambulatory Referral to Neurology; Future    2. Elevated hemoglobin ([address])-  3. Elevated hematocrit-resolved per  H&H. Smear unremarkable. 4. Hyponatremia-decreased serum sodium. Most recent 129 at the end of September. Patient asymptomatic. Repeat BMP ordered. -     Basic metabolic panel; Future    5. Severe episode of recurrent major depressive disorder, without psychotic features (HCC)-no current SI/HI. See below     Depression Screening Follow-up Plan: Patient's depression screening was positive with a PHQ-2 score of 3. Their PHQ-9 score was 14. Continue regular follow-up with their psychologist/therapist/psychiatrist who is managing their mental health condition(s). Depression Screening and Follow-up Plan: Patient's depression screening was positive with a PHQ-2 score of 3. Their PHQ-9 score was 14. Subjective      Patient presents for medication side effect issue. Continues to deal with akathisia. Being managed by her psychiatrist.  They recommended propanolol. Patient concerned about her blood pressure and propanolol. Reviewed recent CBC study and peripheral smear with patient.           Current Outpatient Medications on File Prior to Visit   Medication Sig    albuterol (Ventolin HFA) 90 mcg/act inhaler Inhale 2 puffs every 6 (six) hours as needed for wheezing    amphetamine-dextroamphetamine (ADDERALL XR) 10 MG 24 hr capsule Take 10 mg by mouth    clonazePAM (KlonoPIN) 1 mg tablet TAKE 1 TABLET BY ORAL ROUTE 5 TIMES PER DAY    Durolane 60 MG/3ML injection     ergocalciferol (VITAMIN D2) 50,000 units     gabapentin (NEURONTIN) 300 mg capsule     levothyroxine 100 mcg tablet Take 1 tablet (100 mcg total) by mouth daily    liothyronine (CYTOMEL) 25 mcg tablet     lithium 300 MG tablet Take 1,200 mg by mouth    meclizine (ANTIVERT) 25 mg tablet Take 1 tablet (25 mg total) by mouth 3 (three) times a day as needed for dizziness    Ozempic, 0.25 or 0.5 MG/DOSE, 2 MG/1.5ML injection pen     prazosin (MINIPRESS) 2 mg capsule     traZODone (DESYREL) 100 mg tablet Take 100 mg by mouth    traZODone (DESYREL) 50 mg tablet Take by mouth    dextromethorphan-guaifenesin (MUCINEX DM)  MG per 12 hr tablet Take 1 tablet by mouth every 12 (twelve) hours    mirtazapine (REMERON) 15 mg tablet Take 15 mg by mouth (Patient not taking: Reported on 10/11/2023)       Objective     /68 (BP Location: Left arm, Patient Position: Sitting, Cuff Size: Large)   Pulse 60   Temp 97.7 °F (36.5 °C) (Temporal)   Resp 18   Wt 100 kg (220 lb 6.4 oz)   SpO2 98%   BMI 35.57 kg/m²     Physical Exam  HENT:      Head: Normocephalic. Cardiovascular:      Rate and Rhythm: Normal rate. Pulmonary:      Effort: Pulmonary effort is normal.   Neurological:      Mental Status: She is alert and oriented to person, place, and time. Motor: Tremor present. Gait: Gait normal.   Psychiatric:         Mood and Affect: Mood normal. Affect is flat. Speech: Speech normal.         Behavior: Behavior is cooperative. Thought Content: Thought content does not include suicidal ideation.        Gary Witt DO

## 2023-10-12 ENCOUNTER — AMB VIDEO VISIT (OUTPATIENT)
Dept: OTHER | Facility: HOSPITAL | Age: 63
End: 2023-10-12

## 2023-10-12 NOTE — PROGRESS NOTES
Video Visit - [employer] 58 y.o. female MRN: [id number]    REQUIRED DOCUMENTATION:     At address in chart    1. This service was provided via [employer]. 2. Provider located at [facility] [place] [id number] [address]  [phone].  3. [facility] provider: Jah Medina PA-C.  4. Identify all parties in room with patient during [facility] visit:  Pt alone  5. After connecting through [employer], patient was identified by name and date of birth. Patient was then informed that this was a Telemedicine visit and that the exam was being conducted confidentially over secure lines. My office door was closed. No one else was in the room. Patient acknowledged consent and understanding of privacy and security of the Telemedicine visit. I informed the patient that I have reviewed their record in Epic and presented the opportunity for them to ask any questions regarding the visit today. The patient agreed to participate. Patient presents w/ c/o multiple concerns. She reports confusion regarding her doctor's order to recheck her sodium level. She notes that was hospitalized August 31-Sept 9 at [facility] for depression and suicidal thoughts. She has developed akathisia from a medication she was discharged on. She states that she was tried on remeron and has now been prescribed propanolol by her psychiatrist. She notes that she has orthostatic hypotension and is concerned about her BP dropping too low with the propanolol. She reports feeling depressed and hopeless. She reports feeling hot and cold, restless, and has been having difficulty sleeping. Pt denies thoughts of hurting her self but states that she feels "at the end of her rope". Pt notes that she is new to the area. Review of Systems   Constitutional:  Negative for chills and fever. HENT:  Negative for ear pain and sore throat. Eyes:  Negative for pain and visual disturbance.    Respiratory:  Negative for cough and shortness of breath. Cardiovascular:  Negative for chest pain and palpitations. Gastrointestinal:  Negative for abdominal pain and vomiting. Genitourinary:  Negative for dysuria and hematuria. Musculoskeletal:  Negative for arthralgias and back pain. Skin:  Negative for color change and rash. Neurological:  Negative for seizures and syncope. Psychiatric/Behavioral:  Positive for sleep disturbance. The patient is nervous/anxious. All other systems reviewed and are negative. Physical Exam  Vitals and nursing note reviewed. Constitutional:       General: She is not in acute distress. Appearance: Normal appearance. She is well-developed. She is not ill-appearing or diaphoretic. Comments: Appears fatigued; lying in bed   HENT:      Head: Normocephalic and atraumatic. Nose: Nose normal.      Mouth/Throat:      Mouth: Mucous membranes are moist.      Pharynx: Oropharynx is clear. Eyes:      General:         Right eye: No discharge. Left eye: No discharge. Conjunctiva/sclera: Conjunctivae normal.   Pulmonary:      Effort: Pulmonary effort is normal. No respiratory distress. Breath sounds: Normal breath sounds. Musculoskeletal:      Cervical back: Neck supple. Skin:     General: Skin is dry. Findings: No rash. Comments: Of face and neck   Neurological:      Mental Status: She is alert and oriented to person, place, and time. Psychiatric:         Behavior: Behavior normal.         Thought Content:  Thought content normal.       Diagnoses and all orders for this visit:    Depression, unspecified depression type      Patient Instructions   Discussed sodium level was within normal limits at the end of September; glucose was 129 - pt aware of DM status  Get labs in AM as directed by physician  Hydrate well prior to taking the propanolol  Monitor for worsening symptoms such as dizziness, fatigue, etc.  Follow up with PCP in 2-3 days  If worsening depression, thoughts of hurting self, go to ER    Depression   AMBULATORY CARE:   Depression  is a mood disorder that causes feelings of sadness or hopelessness that do not go away. Depression may cause you to lose interest in things you used to enjoy. These feelings may interfere with your daily life. Common signs and symptoms:   Appetite changes, or weight gain or loss    Trouble falling or staying asleep, or sleeping too much    Fatigue (being mentally and physically tired) or lack of energy    Feeling restless, irritable, or withdrawn    Feeling worthless, hopeless, discouraged, or guilty    Trouble concentrating, remembering things, doing daily tasks, or making decisions    Thoughts about hurting or killing yourself    Call your local emergency number (911 in the [address]) if:   You think about hurting yourself or someone else. You have done something on purpose to hurt yourself. Call your therapist or doctor if:   Your symptoms get worse or do not get better with treatment. Your depression keeps you from doing your regular daily activities. You have new symptoms since your last visit. You have questions or concerns about your condition or care. The following resources are available at any time to help you, if needed:   Contact a suicide prevention organization: For the [employer] Suicide and Crisis Lifeline:     Call or text [phone] [phone] 94 73 a chat on https://[employer].org/chat     Call 2-950.552.8371 (4-751-826-TALK)    For the Suicide Hotline, call 6-228.964.3669 (8-578-ZQRDHDV)    For a list of international numbers: https://save.org/find-help/international-resources/  Treatment for depression  depends on how severe your symptoms are. You may need any of the following:  Cognitive behavioral therapy (CBT)  teaches you how to identify and change negative thought patterns. Antidepressant medicine  may be given to decrease or manage symptoms.  You may need to take this medicine for several weeks before they start working. Self-care:   Talk to someone about your depression. Your healthcare provider may suggest counseling. You might feel more comfortable talking with a friend or family member about your depression. Choose someone you know will be supportive and encouraging. Get regular physical activity. Physical activity can lower your stress, improve your mood, and help you sleep better. Work with your healthcare provider to develop a plan that you enjoy. Create a regular sleep schedule. A routine can help you relax before bed. Listen to music, read, or do yoga. Try to go to bed and wake up at the same time every day. Sleep is important for emotional health. Eat a variety of healthy foods. Healthy foods include fruits, vegetables, whole-grain breads, low-fat dairy products, lean meats, fish, and cooked beans. A healthy meal plan is low in fat, salt, and added sugar. Do not use alcohol, drugs, or nicotine products. These can worsen depression or make it hard to manage. Talk to your therapist or healthcare provider if you use any of these products and need help to quit. Follow up with your therapist or doctor as directed: Your healthcare provider will monitor your progress at follow-up visits. Your provider will also monitor your medicine if you take antidepressants and ask if the medicine is helping. Tell your provider about any side effects or problems you have with your medicine. The type or amount of medicine may need to be changed. Write down your questions so you remember to ask them during your visits. For more information or support:   [employer] on Mental Illness  Formerly [facility], [facility] [address] [facility] , [address]  Phone: [phone]  Phone: 2- [phone] - [phone]  Web Address: http://Jogg.[employer]/. org  [address] [facility] Suicide and [address] [place] [address]  Phone: 2- 844 - 675  Web Address: [employer] [facility]. [employer] OR https://inZair.[employer]/chat/    © Copyright Melquiades Kuo 2023 Information is for End User's use only and may not be sold, redistributed or otherwise used for commercial purposes. The above information is an  only. It is not intended as medical advice for individual conditions or treatments. Talk to your doctor, nurse or pharmacist before following any medical regimen to see if it is safe and effective for you. Follow up with PCP if not improved, if symptoms are worse, go to the ER.

## 2023-10-12 NOTE — CARE ANYWHERE EVISITS
Visit Summary for Gunner Cuba Devin Balderas - Gender: Female - Date of Birth: [id number]  Date: [id number] - Duration: 17 minutes  Patient: Gunner Beltran Ahmet Balderas  Provider: Amira Wang PA-C    Patient Contact Information  Address  [address] [facility]; [address] [facility]  [phone]    Visit Topics    Triage Questions   What is your current physical address in the event of a medical emergency? Answer []  Are you allergic to any medications? Answer []  Are you now or could you be pregnant? Answer []  Do you have any immune system compromise or chronic lung   disease? Answer []  Do you have any vulnerable family members in the home (infant, pregnant, cancer, elderly)? Answer []     Conversation Transcripts  [0A][0A] [Notification] You are connected with Amira Wang PA-C, Urgent Care Specialist.[0A][Notification] Vanessa Saldivar is located in [place]. [0A][Notification] Vanessa Saldivar has shared health history. 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   The area of positive cancer is as marked on the Mohs map.

## 2023-10-12 NOTE — PATIENT INSTRUCTIONS
Discussed sodium level was within normal limits at the end of September; glucose was 129 - pt aware of DM status  Get labs in AM as directed by physician  Hydrate well prior to taking the propanolol  Monitor for worsening symptoms such as dizziness, fatigue, etc.  Follow up with PCP in 2-3 days  If worsening depression, thoughts of hurting self, go to ER    Depression   AMBULATORY CARE:   Depression  is a mood disorder that causes feelings of sadness or hopelessness that do not go away. Depression may cause you to lose interest in things you used to enjoy. These feelings may interfere with your daily life. Common signs and symptoms:   Appetite changes, or weight gain or loss    Trouble falling or staying asleep, or sleeping too much    Fatigue (being mentally and physically tired) or lack of energy    Feeling restless, irritable, or withdrawn    Feeling worthless, hopeless, discouraged, or guilty    Trouble concentrating, remembering things, doing daily tasks, or making decisions    Thoughts about hurting or killing yourself    Call your local emergency number (911 in the [address]) if:   You think about hurting yourself or someone else. You have done something on purpose to hurt yourself. Call your therapist or doctor if:   Your symptoms get worse or do not get better with treatment. Your depression keeps you from doing your regular daily activities. You have new symptoms since your last visit. You have questions or concerns about your condition or care. The following resources are available at any time to help you, if needed:   Contact a suicide prevention organization:         For the 988 Suicide and Crisis Lifeline:     Call or text [phone] 41 94 73 a chat on https://[employer].org/chat     Call 9-162.661.1779 (9-846-980-TALK)    For the Suicide Hotline, call 2-949.979.1383 (8-840-KUEZMSY)    For a list of international numbers: https://save.org/find-help/international-resources/  Treatment for depression  depends on how severe your symptoms are. You may need any of the following:  Cognitive behavioral therapy (CBT)  teaches you how to identify and change negative thought patterns. Antidepressant medicine  may be given to decrease or manage symptoms. You may need to take this medicine for several weeks before they start working. Self-care:   Talk to someone about your depression. Your healthcare provider may suggest counseling. You might feel more comfortable talking with a friend or family member about your depression. Choose someone you know will be supportive and encouraging. Get regular physical activity. Physical activity can lower your stress, improve your mood, and help you sleep better. Work with your healthcare provider to develop a plan that you enjoy. Create a regular sleep schedule. A routine can help you relax before bed. Listen to music, read, or do yoga. Try to go to bed and wake up at the same time every day. Sleep is important for emotional health. Eat a variety of healthy foods. Healthy foods include fruits, vegetables, whole-grain breads, low-fat dairy products, lean meats, fish, and cooked beans. A healthy meal plan is low in fat, salt, and added sugar. Do not use alcohol, drugs, or nicotine products. These can worsen depression or make it hard to manage. Talk to your therapist or healthcare provider if you use any of these products and need help to quit. Follow up with your therapist or doctor as directed: Your healthcare provider will monitor your progress at follow-up visits. Your provider will also monitor your medicine if you take antidepressants and ask if the medicine is helping. Tell your provider about any side effects or problems you have with your medicine. The type or amount of medicine may need to be changed. Write down your questions so you remember to ask them during your visits.   For more information or support:   [employer] on Mental Illness  [facility] [facility] , [address] [facility] , [address] [place] [address] [address]  Phone: 8- [phone]  Phone: 5- 429 - [phone]  Web Address: http://[employer]/. org  [address] [facility] Suicide and [address] [place] [address]  Phone: 3- 681 - 336  Web Address: [employer]. org OR https://[employer]/LIQUITY/    © Copyright [employer] 2023 Information is for End User's use only and may not be sold, redistributed or otherwise used for commercial purposes. The above information is an  only. It is not intended as medical advice for individual conditions or treatments. Talk to your doctor, nurse or pharmacist before following any medical regimen to see if it is safe and effective for you.

## 2023-10-13 LAB
BUN SERPL-MCNC: 10 MG/DL (ref 8–27)
BUN/CREAT SERPL: 11 (ref 12–28)
CALCIUM SERPL-MCNC: 9.9 MG/DL (ref 8.7–10.3)
CHLORIDE SERPL-SCNC: 103 MMOL/L (ref 96–106)
CO2 SERPL-SCNC: 25 MMOL/L (ref 20–29)
CREAT SERPL-MCNC: 0.9 MG/DL (ref 0.57–1)
EGFR: 71 ML/MIN/1.73
GLUCOSE SERPL-MCNC: 129 MG/DL (ref 70–99)
POTASSIUM SERPL-SCNC: 4.5 MMOL/L (ref 3.5–5.2)
SODIUM SERPL-SCNC: 141 MMOL/L (ref 134–144)

## 2023-11-14 ENCOUNTER — TELEPHONE (OUTPATIENT)
Dept: GASTROENTEROLOGY | Facility: CLINIC | Age: 63
End: 2023-11-14

## 2023-11-16 ENCOUNTER — TELEPHONE (OUTPATIENT)
Dept: OBGYN CLINIC | Facility: HOSPITAL | Age: 63
End: 2023-11-16

## 2023-11-16 DIAGNOSIS — M17.0 PRIMARY OSTEOARTHRITIS OF BOTH KNEES: Primary | ICD-10-CM

## 2023-11-16 DIAGNOSIS — M17.12 PRIMARY OSTEOARTHRITIS OF LEFT KNEE: Primary | ICD-10-CM

## 2023-11-16 NOTE — TELEPHONE ENCOUNTER
Caller: Patient  Doctor/office: Dr Pierce/[place] P[facility]  CB#: [phone]      Patient called to start auth/delivery for VISCO/EUFLEXXA/Durolane injections    Body Part: Left knee  Pain Level (scale 1-10): 7  Date of last Gel Injection: 6-1-23    Educated patient on Visco procedure.  Medications must first be authorized and delivered to our office BEFORE we can schedule

## 2023-11-21 ENCOUNTER — OFFICE VISIT (OUTPATIENT)
Dept: OBGYN CLINIC | Facility: CLINIC | Age: 63
End: 2023-11-21
Payer: COMMERCIAL

## 2023-11-21 ENCOUNTER — TELEPHONE (OUTPATIENT)
Age: 63
End: 2023-11-21

## 2023-11-21 VITALS
HEIGHT: 66 IN | SYSTOLIC BLOOD PRESSURE: 111 MMHG | BODY MASS INDEX: 35.36 KG/M2 | HEART RATE: 67 BPM | OXYGEN SATURATION: 97 % | DIASTOLIC BLOOD PRESSURE: 81 MMHG | RESPIRATION RATE: 18 BRPM | WEIGHT: 220 LBS

## 2023-11-21 DIAGNOSIS — M17.0 PRIMARY OSTEOARTHRITIS OF BOTH KNEES: Primary | ICD-10-CM

## 2023-11-21 PROCEDURE — 99213 OFFICE O/P EST LOW 20 MIN: CPT | Performed by: FAMILY MEDICINE

## 2023-11-21 RX ORDER — SUMATRIPTAN 50 MG/1
50 TABLET, FILM COATED ORAL DAILY PRN
COMMUNITY
Start: 2023-11-02

## 2023-11-21 RX ORDER — SENNOSIDES A AND B 8.6 MG/1
17.2 TABLET, FILM COATED ORAL
COMMUNITY
Start: 2023-11-03

## 2023-11-21 RX ORDER — AMANTADINE HYDROCHLORIDE 100 MG/1
100 CAPSULE, GELATIN COATED ORAL 2 TIMES DAILY
COMMUNITY
Start: 2023-11-03

## 2023-11-21 RX ORDER — MAGNESIUM OXIDE 400 MG/1
1 TABLET ORAL DAILY
COMMUNITY
Start: 2023-11-03

## 2023-11-21 NOTE — PROGRESS NOTES
Subjective:    Chief Complaint   Patient presents with    Right Knee - Follow-up, Pain    Left Knee - Follow-up, Pain       Hemant Booker is a 58 y.o. female presenting for follow-up visit in regards to bilateral knee pain. Had received corticosteroid injection for bilateral knees approximately 2 months ago which provided about 6 to 7 weeks of relief. Has now developed pain symptoms over the past week. Rates the pain 7 out of 10 in severity. Would like to submit for hyaluronic acid injection    The following portions were reviewed and updated as needed: allergies, current medications, past medical history, past social history, past surgical history and problem list.    Review of Systems   Constitutional: Negative for fever. HENT: Negative for dental problem and headaches. Eyes: Negative for vision loss. Respiratory: Negative for cough and shortness of breath. Cardiovascular: Negative for leg swelling and palpitations. Gastrointestinal: Negative for constipation and diarrhea. Genitourinary: Negative for bladder incontinence and difficulty urinating. Musculoskeletal: Negative for back pain and difficulty walking. Skin: Negative for rash and ulcer. Neurological: Negative for dizziness and headaches. Hem/Lymph/Immuno: Negative for blood clots. Does not bruise/bleed easily. Psychiatric/Behavioral: Negative for confusion.          Objective:  General: no acute distress, non toxic, AAO x3   Skin: no skin changes, no rashes, no wounds or laceration  Vasculature: normal cap refill, no LE edema, normal popliteal and dorsalis pedis pulse  Neurologic:   Musculoskeletal: Bilateral KNEE EXAM  Gait: limping gait negative, able to weight bear without difficulty  Inspection: No gross deformity, no redness or warmth   Effusion: Negative   Medial joint line TTP: Positive  Lateral joint line TTP: Positive  ROM: Full flexion and extension  [facility]'s: negative,   Instability to varus/valgus stress: negative  Anterior Drawer: negative   Lachman's test: negative  Posterior Drawer: negative            Imaging:       Assessment/Plan:  1. Primary osteoarthritis of both knees  Submission for hyaluronic acid injections-has received response in the past.  Submit for 3 series shot as the single series series injection seem to cause her increased pain symptoms.

## 2023-11-21 NOTE — TELEPHONE ENCOUNTER
Caller:     Doctor/Office: Yuly Richardson    CB#: [phone]      What needs to be faxed:  PT order dated 11/21    ATTN to: [facility]    Fax#: [phone]

## 2023-11-29 ENCOUNTER — TELEPHONE (OUTPATIENT)
Dept: OBGYN CLINIC | Facility: HOSPITAL | Age: 63
End: 2023-11-29

## 2023-11-29 ENCOUNTER — TELEPHONE (OUTPATIENT)
Age: 63
End: 2023-11-29

## 2023-11-29 NOTE — TELEPHONE ENCOUNTER
Patient went to Neurology for   Akathisia  and they referred her to a movement disorder specialist, but she can't be seen till Sept.Is this ok?

## 2023-11-30 NOTE — TELEPHONE ENCOUNTER
I am not completely sure but Dr. Yared Montero  may be able to help  this is a private office.  Please provide number

## 2023-11-30 NOTE — TELEPHONE ENCOUNTER
Patient called again left a voice mail requesting to speak with the person she spoke with yesterday. Please call patient.  She still has question about referral.

## 2023-12-05 ENCOUNTER — TELEPHONE (OUTPATIENT)
Dept: NEPHROLOGY | Facility: CLINIC | Age: 63
End: 2023-12-05

## 2023-12-05 DIAGNOSIS — R80.9 MICROALBUMINURIA: Primary | ICD-10-CM

## 2023-12-05 NOTE — TELEPHONE ENCOUNTER
Called and spoke to pt. Pt will do labs prior to her appt. Pt stated that will be going to [facility] on [address] in [place]. Lab order sent at [phone].

## 2023-12-08 LAB
ALBUMIN/CREAT UR: 59 MG/G CREAT (ref 0–29)
APPEARANCE UR: CLEAR
BILIRUB UR QL STRIP: NEGATIVE
COLOR UR: YELLOW
CREAT UR-MCNC: 31.3 MG/DL
GLUCOSE UR QL: NEGATIVE
HGB UR QL STRIP: NEGATIVE
KETONES UR QL STRIP: NEGATIVE
LEUKOCYTE ESTERASE UR QL STRIP: NEGATIVE
MICRO URNS: NORMAL
MICROALBUMIN UR-MCNC: 18.6 UG/ML
NITRITE UR QL STRIP: NEGATIVE
PH UR STRIP: 7.5 [PH] (ref 5–7.5)
PROT UR QL STRIP: NEGATIVE
SP GR UR: 1.01 (ref 1–1.03)
UROBILINOGEN UR STRIP-ACNC: 0.2 MG/DL (ref 0.2–1)

## 2023-12-11 ENCOUNTER — TELEPHONE (OUTPATIENT)
Dept: OBGYN CLINIC | Facility: HOSPITAL | Age: 63
End: 2023-12-11

## 2023-12-11 NOTE — TELEPHONE ENCOUNTER
Caller: Patient     Doctor: Shira Allan    Patient is calling back to check if she can still attend the appointment tomorrow? ?     Reason for call: Patient's insurance was cancelled accidentally but should be be back on within 48hrs. Patient wants to know if she can still come in for her appointment tomorrow, 12/12. Please advise.      Call back#: [phone]

## 2023-12-11 NOTE — TELEPHONE ENCOUNTER
Caller: Patient    Doctor: Jimmy Najera    Reason for call: Patient's insurance was cancelled accidentally but should be be back on within 48hrs. Patient wants to know if she can still come in for her appointment tomorrow, 12/12. Please advise.     Call back#: [phone]

## 2023-12-13 ENCOUNTER — CONSULT (OUTPATIENT)
Dept: NEPHROLOGY | Facility: CLINIC | Age: 63
End: 2023-12-13
Payer: COMMERCIAL

## 2023-12-13 ENCOUNTER — TELEPHONE (OUTPATIENT)
Dept: NEPHROLOGY | Facility: CLINIC | Age: 63
End: 2023-12-13

## 2023-12-13 VITALS
BODY MASS INDEX: 36.1 KG/M2 | WEIGHT: 224.6 LBS | HEART RATE: 72 BPM | TEMPERATURE: 97.5 F | HEIGHT: 66 IN | OXYGEN SATURATION: 96 % | DIASTOLIC BLOOD PRESSURE: 90 MMHG | RESPIRATION RATE: 18 BRPM | SYSTOLIC BLOOD PRESSURE: 122 MMHG

## 2023-12-13 DIAGNOSIS — Z90.5 HISTORY OF NEPHRECTOMY: ICD-10-CM

## 2023-12-13 DIAGNOSIS — R80.9 MICROALBUMINURIA: Primary | ICD-10-CM

## 2023-12-13 DIAGNOSIS — E11.9 TYPE 2 DIABETES MELLITUS WITHOUT COMPLICATION, WITHOUT LONG-TERM CURRENT USE OF INSULIN (HCC): ICD-10-CM

## 2023-12-13 PROCEDURE — 99204 OFFICE O/P NEW MOD 45 MIN: CPT

## 2023-12-13 RX ORDER — CARIPRAZINE 1.5 MG/1
1.5 CAPSULE, GELATIN COATED ORAL DAILY
COMMUNITY
Start: 2023-11-04

## 2023-12-13 NOTE — TELEPHONE ENCOUNTER
I called Harper Roche @ [phone] Automated System and also spoke to [facility] [employer] Rep) to check eligible for the patient and as of  12/13/2023 the patient has current active coverage as of 1/1/2023. The [employer] System 4 digit reference number is: [id number] and the reference number from [facility] Rep is: [id number].  Daphnie Oconnell, .

## 2023-12-13 NOTE — PROGRESS NOTES
NEPHROLOGY OFFICE NOTE    Patient: Amita Alba               Sex: female           YOB: 1960        Age:  58 y.o.       12/13/2023      BACKGROUND     I had the pleasure of seeing Amita Alba in the nephrology office today for consultation. She has a past medical history significant for type 2 diabetes, Bipolar disorder, hypothyroidism, and depression. She was referred to our office for microalbuminuria. She has underlying Bipolar disorder and depression, managed on multiple medications. She has underlying osteoarthritis and following with Orthopedics for management of knee pain. She had undergone right nephrectomy in the past.  Reports that she underwent total abdominal hysterectomy in 2009 and there was an injury to her right kidney. She reports that she was told that the kidney "shriveled up" and had to be removed. She had not been referred to a nephrologist in the past and has not seen a kidney doctor. Most recent renal imaging was obtained in July of 2013, which showed no structural abnormality of the left kidney. She has underlying type 2 diabetes and is maintained on Ozempic. Reports blood sugars are under good control at home. She denies any history of hypertension, does report that she does monitor her home blood pressure at home because she was having issues with orthostatic hypotension, which is since improved. The last lab work was done on 12/7/2023, which we have reviewed together. SUBJECTIVE     She currently has no complaints at this time and is feeling well. Patient denies any chest pain, shortness of breath, or swelling. REVIEW OF SYSTEMS     Review of Systems   Constitutional:  Negative for activity change, chills, fatigue and fever. HENT:  Negative for trouble swallowing. Respiratory:  Negative for shortness of breath. Cardiovascular:  Negative for leg swelling. Gastrointestinal:  Positive for diarrhea (intermittent).  Negative for constipation, nausea and vomiting. Genitourinary:  Negative for difficulty urinating, dysuria, frequency and hematuria. Musculoskeletal:  Negative for back pain. Skin:  Negative for pallor. Neurological:  Negative for dizziness, syncope, weakness and light-headedness. Psychiatric/Behavioral:  Negative for sleep disturbance. The patient is not nervous/anxious. OBJECTIVE     Current Weight: Weight - Scale: 102 kg (224 lb 9.6 oz)  Vitals:    12/13/23 1552   BP: 122/90   Pulse: 72   Resp: 18   Temp: 97.5 °F (36.4 °C)   SpO2: 96%     Body mass index is 36.25 kg/m².     CURRENT MEDICATIONS       Current Outpatient Medications:     albuterol (Ventolin HFA) 90 mcg/act inhaler, Inhale 2 puffs every 6 (six) hours as needed for wheezing, Disp: 18 g, Rfl: 5    amantadine (SYMMETREL) 100 mg capsule, Take 100 mg by mouth 2 (two) times a day, Disp: , Rfl:     clonazePAM (KlonoPIN) 1 mg tablet, Take 1 mg by mouth 2 (two) times a day, Disp: , Rfl:     ergocalciferol (VITAMIN D2) 50,000 units, , Disp: , Rfl:     gabapentin (NEURONTIN) 300 mg capsule, Take 300 mg by mouth 3 (three) times a day, Disp: , Rfl:     levothyroxine 100 mcg tablet, Take 1 tablet (100 mcg total) by mouth daily, Disp: 90 tablet, Rfl: 1    liothyronine (CYTOMEL) 25 mcg tablet, Take 25 mcg by mouth daily, Disp: , Rfl:     lithium 300 MG tablet, Take 600 mg by mouth daily in the early morning, Disp: , Rfl:     magnesium oxide (MAG-OX) 400 mg tablet, Take 1 tablet by mouth daily, Disp: , Rfl:     meclizine (ANTIVERT) 25 mg tablet, Take 1 tablet (25 mg total) by mouth 3 (three) times a day as needed for dizziness, Disp: 30 tablet, Rfl: 0    Ozempic, 0.25 or 0.5 MG/DOSE, 2 MG/1.5ML injection pen, , Disp: , Rfl:     SUMAtriptan (IMITREX) 50 mg tablet, Take 50 mg by mouth daily as needed, Disp: , Rfl:     traZODone (DESYREL) 100 mg tablet, Take 200 mg by mouth daily at bedtime, Disp: , Rfl:     Vraylar 1.5 MG capsule, Take 1.5 mg by mouth daily, Disp: , Rfl: PHYSICAL EXAMINATION     Physical Exam  Vitals and nursing note reviewed. Constitutional:       General: She is not in acute distress. HENT:      Head: Normocephalic. Mouth/Throat:      Lips: Pink. Mouth: Mucous membranes are moist.   Eyes:      General: Lids are normal. No scleral icterus. Cardiovascular:      Rate and Rhythm: Normal rate and regular rhythm. Heart sounds: S1 normal and S2 normal.   Pulmonary:      Effort: Pulmonary effort is normal. No accessory muscle usage or respiratory distress. Breath sounds: Normal breath sounds. Abdominal:      General: There is no distension. Tenderness: There is no abdominal tenderness. Musculoskeletal:      Cervical back: Normal range of motion and neck supple. No tenderness. Right lower leg: No edema. Left lower leg: No edema. Skin:     General: Skin is warm. Coloration: Skin is not cyanotic or jaundiced. Neurological:      General: No focal deficit present. Mental Status: She is alert and oriented to person, place, and time. Psychiatric:         Attention and Perception: Attention normal.         Speech: Speech normal.         Behavior: Behavior is cooperative. LAB RESULTS     BMP 10/18/2023:  Sodium 137  Potassium 4.4  Chloride 106  CO2 22  BUN 11  Creatinine 0.86  Glucose 108  Calcium 9.3  eGFR 75    Urine albumin to creatinine ratio 12/7/2023 - 61    RADIOLOGY RESULTS      CT Renal Stone Study 7/10/2023:  Clinical Indication:  Left lower quadrant pain     Multidetector-row CT images of the abdomen and pelvis are obtained from the xiphoid through the symphysis. Only oral contrast is administered. Prior CT study of 8/25/09 and MRI of 9/9/11 are available for comparison     01. Liver: Persistent diffuse fatty liver. No focal mass   02. Spleen: Normal   03. Pancreas: Normal   04. Gallbladder/Biliary Tree: Normal   05. Adrenals: Normal   06. Kidneys: Is status post right nephrectomy.  Nephrectomy bed appears normal. Left kidney is normal   07. Retroperitoneum/lymphadenopathy: No adenopathy   08. Bowel: No bowel-related abnormalities   09. Pelvic viscera: Normal   10. Pelvic Lymphnodes: No pelvic adenopathy   11. Aorta/IVC/Pelvic Vessels: Normal   11. Peritoneum/abdominal wall: No ascites or implants   12. Skeletal: No lytic/blastic lesions   13. Lung Bases: No nodules       ASSESSMENT/PLAN     History of right nephrectomy:  Patient underwent right total nephrectomy 2009, most recent CT imaging in June 2013 showed no structural abnormality of the left kidney. After review of the medical record, it appears baseline creatinine levels fluctuate around 0.8. Most recent creatinine level was 0.86 with an eGFR 75 and preserved. Advised patient she may be showing the beginning signs of CKD secondary to reduced nephron mass given prior nephrectomy as well as microalbuminuria as below. Will repeat BMP prior to follow-up. Recommend adequate hydration and avoidance of nephrotoxic agents such as NSAIDs. Microalbuminuria:  Noted presence of trace proteinuria on urinalysis 10/18/2023, follow-up evaluation revealed a mildly elevated microalbumin to creatinine ratio of 59. Suspect etiology may be secondary to diabetic nephropathy. Advised avoidance of high protein intake and NSAIDs. If persistent proteinuria noted on follow-up urine studies, will consider initiation of ACE/ARB therapy at that time if blood pressure allows. Type 2 Diabetes:  Currently maintained on Ozempic. Most recent hemoglobin A1C was 5.7. Recommend Nephrology follow-up in 6 months. Claire Lo, [address] [facility]  Nephrology  12/13/2023      Portions of the record may have been created with voice recognition software. Occasional wrong word or "sound a like" substitutions may have occurred due to the inherent limitations of voice recognition software.  Read the chart carefully and recognize, using context, where substitutions have occurred.

## 2023-12-14 ENCOUNTER — PROCEDURE VISIT (OUTPATIENT)
Dept: OBGYN CLINIC | Facility: CLINIC | Age: 63
End: 2023-12-14
Payer: COMMERCIAL

## 2023-12-14 ENCOUNTER — TELEPHONE (OUTPATIENT)
Dept: OBGYN CLINIC | Facility: HOSPITAL | Age: 63
End: 2023-12-14

## 2023-12-14 VITALS
DIASTOLIC BLOOD PRESSURE: 79 MMHG | BODY MASS INDEX: 36 KG/M2 | SYSTOLIC BLOOD PRESSURE: 112 MMHG | HEIGHT: 66 IN | OXYGEN SATURATION: 98 % | HEART RATE: 79 BPM | RESPIRATION RATE: 18 BRPM | WEIGHT: 224 LBS

## 2023-12-14 DIAGNOSIS — M17.0 PRIMARY OSTEOARTHRITIS OF BOTH KNEES: Primary | ICD-10-CM

## 2023-12-14 PROCEDURE — 20610 DRAIN/INJ JOINT/BURSA W/O US: CPT | Performed by: FAMILY MEDICINE

## 2023-12-14 NOTE — PROGRESS NOTES
Large joint arthrocentesis: bilateral knee  Universal Protocol:  Consent: Verbal consent obtained.   Risks and benefits: risks, benefits and alternatives were discussed  Consent given by: patient  Supporting Documentation  Indications: pain   Procedure Details  Location: knee - bilateral knee  Preparation: Patient was prepped and draped in the usual sterile fashion  Needle size: 22 G  Ultrasound guidance: no  Approach: anterolateral    Medications (Right): 2 mL sodium hyaluronate 16.8 MG/2MLMedications (Left): 2 mL sodium hyaluronate 16.8 MG/2ML   Patient tolerance: patient tolerated the procedure well with no immediate complications

## 2023-12-14 NOTE — TELEPHONE ENCOUNTER
Caller: Alan Johansen     Doctor: Irlanda Richards / Marin Mccollum     Reason for call: Patient was seen for B/L  gel injections today in office. She states she noticed swelling around noon and it has not gone down with icing. Patient states she has pain in both her legs.      Left leg pain scale 7/10  Right leg pain scale 5/10     Please advise if this is normal.     Call back#: [phone]

## 2023-12-14 NOTE — TELEPHONE ENCOUNTER
Caller: myself to patient     Doctor: Charles Cleary /     Reason for call: see previous message. I called patient and relayed message from Dr. Charles Cleary. Patient stated she is unable to take nsaid due to only having one kidney. She is taking tylenol arthritis without any relief. Patient understood message and will watch for redness of the knee and fever. Patient will call in tomorrow with update if symptoms worsen.           Call back#: [phone]

## 2023-12-15 NOTE — TELEPHONE ENCOUNTER
Some local site swelling and pain can be expected after injection, would continue to monitor.  Reassuring that it is improving

## 2023-12-15 NOTE — TELEPHONE ENCOUNTER
Caller: Patient- Delilah Gross    Doctor: Dr Giselle Winslow    Reason for call: Patient is calling in stating that she received injections yesterday 12/14 to both of her legs. The pain in the right knee has mostly subsided but that pain in the left is still bothering her. She is still having swelling in both her knees as she just wants to make sure that this is still normal for her to still be experiencing this as she is asking for a call back relating this.      Call back#: [phone]

## 2023-12-15 NOTE — TELEPHONE ENCOUNTER
Call to this patient to relay Dr Génesis Oropeza message she will cont. to monitor the site and understood instructions.

## 2023-12-19 ENCOUNTER — TELEPHONE (OUTPATIENT)
Age: 63
End: 2023-12-19

## 2023-12-19 DIAGNOSIS — U07.1 COVID-19 VIRUS INFECTION: Primary | ICD-10-CM

## 2023-12-19 NOTE — TELEPHONE ENCOUNTER
Patient left a voice mail message    Hi, my name is Katlyn Frye. I'm a patient of . I just tested positive for COVID, the history of bronchitis and pneumonia and asthma and diabetic. Just wondering if I need to be on any medication. It seems to be mild, but I just wanted to check in and see if there's anything I need to do besides just rest and isolate. OK, thank you. My number is [phone]

## 2023-12-19 NOTE — TELEPHONE ENCOUNTER
Stan    Pt would like to speak to clinical team. She tested pos for covid and would also like to discuss the swelling in the knee.     Offered to RS her second gel inj to one week after her currently scheduled 3rd gel inj but preferred to hold off.     Callback: [phone]

## 2023-12-19 NOTE — TELEPHONE ENCOUNTER
Rest, isolate for 5 days, taking vitamin c and zinc.  Drink more warm fluids and using cold and sinus medication if congestion gets bad.   For the most part I don't recommend taking antivirals. If your breathing become hard or you have any chest pain it is best to go to the ER.

## 2023-12-19 NOTE — TELEPHONE ENCOUNTER
Called and spoke with pt, seend 12/14/23 for bilateral knee VISCO injections.  Pt is having swelling in both knees more so the left than right where the injection was inserted. Pt states the pain is similar to before she had the injection, achy. Left knee where the injection was inserted is tender to touch than the right knee. Pt denies redness from knee into calf. Pt has feeling in both extremities. Pt has been elevating and icing. Pt has been trying to move extremities.     Pt received another call from [facility] while I was on the line, I advised the pt to take the call incase it was the provider calling her directly. I will call pt back or wait for her call

## 2023-12-28 ENCOUNTER — TELEPHONE (OUTPATIENT)
Age: 63
End: 2023-12-28

## 2023-12-28 NOTE — TELEPHONE ENCOUNTER
Pt would like a call back.    She had COVID around a wk ago; took Paxlovid prescribed by another - symptoms went away     Her symptoms are worse now:COVID is back   Wheezing  Fever of 101 point 8    Taking Mucinex, Allegra D, using humidifier  and inhaler    What else can she do?  Should she just wait it out?

## 2023-12-28 NOTE — TELEPHONE ENCOUNTER
COVID is not back.  Too early to be reinfected.  It is likely a different infection.  Could be the common cold or could be influenza.  I would add NyQuil or DayQuil to the therapies that she is taking.  If her fever does not break with Tylenol or Motrin then she should go to the emergency room

## 2024-01-04 ENCOUNTER — HOSPITAL ENCOUNTER (OUTPATIENT)
Age: 64
Discharge: HOME | End: 2024-01-04
Payer: COMMERCIAL

## 2024-01-04 ENCOUNTER — PROCEDURE VISIT (OUTPATIENT)
Dept: OBGYN CLINIC | Facility: CLINIC | Age: 64
End: 2024-01-04
Payer: COMMERCIAL

## 2024-01-04 VITALS
SYSTOLIC BLOOD PRESSURE: 114 MMHG | HEART RATE: 71 BPM | DIASTOLIC BLOOD PRESSURE: 82 MMHG | BODY MASS INDEX: 35.36 KG/M2 | RESPIRATION RATE: 18 BRPM | OXYGEN SATURATION: 98 % | HEIGHT: 66 IN | WEIGHT: 220 LBS

## 2024-01-04 VITALS — HEIGHT: 66 IN | WEIGHT: 224 LBS | BODY MASS INDEX: 36 KG/M2

## 2024-01-04 DIAGNOSIS — M17.0 PRIMARY OSTEOARTHRITIS OF BOTH KNEES: Primary | ICD-10-CM

## 2024-01-04 PROCEDURE — 77067 SCR MAMMO BI INCL CAD: CPT

## 2024-01-04 PROCEDURE — 77063 BREAST TOMOSYNTHESIS BI: CPT

## 2024-01-04 PROCEDURE — 20610 DRAIN/INJ JOINT/BURSA W/O US: CPT | Performed by: FAMILY MEDICINE

## 2024-01-04 RX ORDER — DOXYCYCLINE HYCLATE 100 MG
100 TABLET ORAL 2 TIMES DAILY
COMMUNITY
Start: 2023-12-30 | End: 2024-01-06

## 2024-01-04 RX ORDER — BENZONATATE 100 MG/1
CAPSULE ORAL
COMMUNITY
Start: 2023-12-30 | End: 2024-01-06

## 2024-01-15 ENCOUNTER — PROCEDURE VISIT (OUTPATIENT)
Dept: OBGYN CLINIC | Facility: CLINIC | Age: 64
End: 2024-01-15
Payer: COMMERCIAL

## 2024-01-15 VITALS
DIASTOLIC BLOOD PRESSURE: 80 MMHG | WEIGHT: 220 LBS | RESPIRATION RATE: 18 BRPM | OXYGEN SATURATION: 97 % | BODY MASS INDEX: 35.36 KG/M2 | SYSTOLIC BLOOD PRESSURE: 110 MMHG | HEIGHT: 66 IN | HEART RATE: 76 BPM

## 2024-01-15 DIAGNOSIS — M17.0 PRIMARY OSTEOARTHRITIS OF BOTH KNEES: Primary | ICD-10-CM

## 2024-01-15 PROCEDURE — 20610 DRAIN/INJ JOINT/BURSA W/O US: CPT | Performed by: FAMILY MEDICINE

## 2024-01-15 RX ORDER — MULTIVITAMIN WITH IRON
100 TABLET ORAL DAILY
COMMUNITY

## 2024-01-15 NOTE — PROGRESS NOTES
Large joint arthrocentesis: bilateral knee  Universal Protocol:  Consent: Verbal consent obtained.  Risks and benefits: risks, benefits and alternatives were discussed  Consent given by: patient  Patient identity confirmed: verbally with patient  Supporting Documentation  Indications: pain   Procedure Details  Location: knee - bilateral knee  Preparation: Patient was prepped and draped in the usual sterile fashion  Needle size: 22 G  Ultrasound guidance: no  Approach: anterolateral    Medications (Right): 2 mL sodium hyaluronate 16.8 MG/2MLMedications (Left): 2 mL sodium hyaluronate 16.8 MG/2ML   Patient tolerance: patient tolerated the procedure well with no immediate complications           Pt tolerated surgery and anesthesia well. Pt transported to PACU with all vital signs stable.

## 2024-01-19 ENCOUNTER — ANESTHESIA (OUTPATIENT)
Dept: GASTROENTEROLOGY | Facility: HOSPITAL | Age: 64
End: 2024-01-19

## 2024-01-19 ENCOUNTER — HOSPITAL ENCOUNTER (OUTPATIENT)
Dept: GASTROENTEROLOGY | Facility: HOSPITAL | Age: 64
Setting detail: OUTPATIENT SURGERY
End: 2024-01-19
Attending: INTERNAL MEDICINE
Payer: COMMERCIAL

## 2024-01-19 ENCOUNTER — ANESTHESIA EVENT (OUTPATIENT)
Dept: GASTROENTEROLOGY | Facility: HOSPITAL | Age: 64
End: 2024-01-19

## 2024-01-19 VITALS
HEART RATE: 66 BPM | WEIGHT: 221.56 LBS | DIASTOLIC BLOOD PRESSURE: 76 MMHG | TEMPERATURE: 97.6 F | BODY MASS INDEX: 35.61 KG/M2 | HEIGHT: 66 IN | SYSTOLIC BLOOD PRESSURE: 123 MMHG | OXYGEN SATURATION: 96 % | RESPIRATION RATE: 16 BRPM

## 2024-01-19 DIAGNOSIS — Z12.11 SCREENING FOR COLON CANCER: ICD-10-CM

## 2024-01-19 DIAGNOSIS — Z80.0 FAMILY HISTORY OF COLON CANCER: ICD-10-CM

## 2024-01-19 LAB — GLUCOSE SERPL-MCNC: 158 MG/DL (ref 65–140)

## 2024-01-19 PROCEDURE — 45380 COLONOSCOPY AND BIOPSY: CPT | Performed by: INTERNAL MEDICINE

## 2024-01-19 PROCEDURE — 88305 TISSUE EXAM BY PATHOLOGIST: CPT | Performed by: PATHOLOGY

## 2024-01-19 PROCEDURE — 82948 REAGENT STRIP/BLOOD GLUCOSE: CPT

## 2024-01-19 RX ORDER — PROPOFOL 10 MG/ML
INJECTION, EMULSION INTRAVENOUS AS NEEDED
Status: DISCONTINUED | OUTPATIENT
Start: 2024-01-19 | End: 2024-01-19

## 2024-01-19 RX ORDER — LIDOCAINE HYDROCHLORIDE 20 MG/ML
INJECTION, SOLUTION EPIDURAL; INFILTRATION; INTRACAUDAL; PERINEURAL AS NEEDED
Status: DISCONTINUED | OUTPATIENT
Start: 2024-01-19 | End: 2024-01-19

## 2024-01-19 RX ORDER — SODIUM CHLORIDE, SODIUM LACTATE, POTASSIUM CHLORIDE, CALCIUM CHLORIDE 600; 310; 30; 20 MG/100ML; MG/100ML; MG/100ML; MG/100ML
INJECTION, SOLUTION INTRAVENOUS CONTINUOUS PRN
Status: DISCONTINUED | OUTPATIENT
Start: 2024-01-19 | End: 2024-01-19

## 2024-01-19 RX ADMIN — PROPOFOL 20 MG: 10 INJECTION, EMULSION INTRAVENOUS at 07:23

## 2024-01-19 RX ADMIN — PROPOFOL 30 MG: 10 INJECTION, EMULSION INTRAVENOUS at 07:29

## 2024-01-19 RX ADMIN — PROPOFOL 50 MG: 10 INJECTION, EMULSION INTRAVENOUS at 07:17

## 2024-01-19 RX ADMIN — PROPOFOL 20 MG: 10 INJECTION, EMULSION INTRAVENOUS at 07:25

## 2024-01-19 RX ADMIN — LIDOCAINE HYDROCHLORIDE 100 MG: 20 INJECTION, SOLUTION EPIDURAL; INFILTRATION; INTRACAUDAL; PERINEURAL at 07:16

## 2024-01-19 RX ADMIN — PROPOFOL 30 MG: 10 INJECTION, EMULSION INTRAVENOUS at 07:21

## 2024-01-19 RX ADMIN — PROPOFOL 50 MG: 10 INJECTION, EMULSION INTRAVENOUS at 07:18

## 2024-01-19 RX ADMIN — SODIUM CHLORIDE, SODIUM LACTATE, POTASSIUM CHLORIDE, AND CALCIUM CHLORIDE: .6; .31; .03; .02 INJECTION, SOLUTION INTRAVENOUS at 07:14

## 2024-01-19 RX ADMIN — PROPOFOL 100 MG: 10 INJECTION, EMULSION INTRAVENOUS at 07:16

## 2024-01-19 NOTE — ANESTHESIA POSTPROCEDURE EVALUATION
Post-Op Assessment Note    CV Status:  Stable  Pain Score: 0    Pain management: adequate       Mental Status:  Sleepy   Hydration Status:  Euvolemic   PONV Controlled:  Controlled   Airway Patency:  Patent     Post Op Vitals Reviewed: Yes      Staff: CRNA               /75 (01/19/24 0745)    Temp 97.6 °F (36.4 °C) (01/19/24 0734)    Pulse 66 (01/19/24 0745)   Resp 18 (01/19/24 0745)    SpO2 96 % (01/19/24 0745)

## 2024-01-19 NOTE — ANESTHESIA PREPROCEDURE EVALUATION
Procedure:  COLONOSCOPY    Relevant Problems   ENDO   (+) Type 2 diabetes mellitus without complication, without long-term current use of insulin (HCC)   Bipolar  Last dose of ozempic 1/10/2024  NPO since 11p 1/18       Physical Exam    Airway    Mallampati score: II  TM Distance: >3 FB  Neck ROM: full     Dental   No notable dental hx     Cardiovascular  Rhythm: regular    Pulmonary   Breath sounds clear to auscultation    Other Findings  post-pubertal.      Anesthesia Plan  ASA Score- 2     Anesthesia Type- IV sedation with anesthesia with ASA Monitors.         Additional Monitors:     Airway Plan:            Plan Factors-    Chart reviewed.   Existing labs reviewed. Patient summary reviewed.    Patient is not a current smoker.              Induction- intravenous.    Postoperative Plan-     Informed Consent- Anesthetic plan and risks discussed with patient.  I personally reviewed this patient with the CRNA. Discussed and agreed on the Anesthesia Plan with the CRNA..

## 2024-01-19 NOTE — H&P
History and Physical - [facility]  Katlyn Frye 63 y.o. female MRN: [id number]                  HPI: Katlyn Frye is a 63 y.o. year old female who presents for colonoscopy for colon cancer screening.      REVIEW OF SYSTEMS: Per the HPI, and otherwise unremarkable.    Historical Information   Past Medical History:   Diagnosis Date    Arthritis     Bilateral knee    Asthma All my life; pretty mild these days    Bipolar 2 disorder (HCC) 1990    Chronic kidney disease     Depression     Diabetes mellitus (HCC)     Disease of thyroid gland 2009    GERD (gastroesophageal reflux disease)      Past Surgical History:   Procedure Laterality Date    COLONOSCOPY      DILATION AND CURETTAGE OF UTERUS      ELECTROCONVULSIVE THERAPY      HYSTERECTOMY  2007    KIDNEY SURGERY Right 2009    KNEE SURGERY  Right meniscus repair    SINUS SURGERY  1987     Social History   Social History     Substance and Sexual Activity   Alcohol Use Yes    Comment: social 1 cup a day wine     Social History     Substance and Sexual Activity   Drug Use Never     Social History     Tobacco Use   Smoking Status Never    Passive exposure: Never   Smokeless Tobacco Never     Family History   Problem Relation Age of Onset    Heart disease Mother     Colon cancer Father     Neuropathy Father     Cancer Father     Colon cancer Maternal Grandmother     Diabetes Maternal Grandfather     Heart attack Brother     Breast cancer Cousin        Meds/Allergies       Current Outpatient Medications:     albuterol (Ventolin HFA) 90 mcg/act inhaler    clonazePAM (KlonoPIN) 1 mg tablet    ELDERBERRY PO    ergocalciferol (VITAMIN D2) 50,000 units    gabapentin (NEURONTIN) 300 mg capsule    liothyronine (CYTOMEL) 25 mcg tablet    lithium 300 MG tablet    magnesium oxide (MAG-OX) 400 mg tablet    pyridoxine (VITAMIN B6) 100 mg tablet    traZODone (DESYREL) 100 mg tablet    Vraylar 1.5 MG capsule    amantadine (SYMMETREL) 100 mg capsule     "beclomethasone (QVAR REDIHALER) 40 MCG/ACT inhaler    levothyroxine 100 mcg tablet    meclizine (ANTIVERT) 25 mg tablet    Ozempic, 0.25 or 0.5 MG/DOSE, 2 MG/1.5ML injection pen    SUMAtriptan (IMITREX) 50 mg tablet    Allergies   Allergen Reactions    Iv Contrast [Iodinated Contrast Media] Vomiting     Vomiting blood    Lamictal [Lamotrigine] Hives     rash    Latuda [Lurasidone] Hives     rash    Levaquin [Levofloxacin] Hives     rash    Dulaglutide Diarrhea    Benztropine Diarrhea, Other (See Comments) and Headache     Insomnia, muscle weakness acathisia worsening    Brexpiprazole Other (See Comments)     Feels like something is crawling under her skin when she sits she starts uncontrollable movement to rock back and off     Lumateperone Anxiety, Irritability and Other (See Comments)       Objective     /80   Pulse 86   Temp 97.8 °F (36.6 °C) (Temporal)   Resp 18   Ht 5' 6\" (1.676 m)   Wt 101 kg (221 lb 9 oz)   SpO2 97%   BMI 35.76 kg/m²       PHYSICAL EXAM    Gen: NAD  Head: NCAT  CV: RRR  CHEST: Clear  ABD: soft, NT/ND  EXT: no edema      ASSESSMENT/PLAN:   Katlyn Frye is a 63 y.o. year old female who presents for colonoscopy for colon cancer screening. The patient is stable and optimized for the procedure, we reviewed risk and benefits. Risk include but not limited to infection, bleeding, perforation and missing a lesion.          "

## 2024-01-22 PROCEDURE — 88305 TISSUE EXAM BY PATHOLOGIST: CPT | Performed by: PATHOLOGY

## 2024-02-08 ENCOUNTER — OFFICE VISIT (OUTPATIENT)
Age: 64
End: 2024-02-08
Payer: COMMERCIAL

## 2024-02-08 VITALS
DIASTOLIC BLOOD PRESSURE: 62 MMHG | WEIGHT: 228 LBS | OXYGEN SATURATION: 98 % | RESPIRATION RATE: 18 BRPM | HEART RATE: 66 BPM | BODY MASS INDEX: 36.8 KG/M2 | TEMPERATURE: 97.6 F | SYSTOLIC BLOOD PRESSURE: 100 MMHG

## 2024-02-08 DIAGNOSIS — R80.9 TYPE 2 DIABETES MELLITUS WITH MICROALBUMINURIA, WITHOUT LONG-TERM CURRENT USE OF INSULIN: ICD-10-CM

## 2024-02-08 DIAGNOSIS — Z00.00 ANNUAL PHYSICAL EXAM: Primary | ICD-10-CM

## 2024-02-08 DIAGNOSIS — G24.01 TARDIVE DYSKINESIA: ICD-10-CM

## 2024-02-08 DIAGNOSIS — E11.29 TYPE 2 DIABETES MELLITUS WITH MICROALBUMINURIA, WITHOUT LONG-TERM CURRENT USE OF INSULIN: ICD-10-CM

## 2024-02-08 DIAGNOSIS — F31.4 BIPOLAR 1 DISORDER, DEPRESSED, SEVERE (HCC): ICD-10-CM

## 2024-02-08 DIAGNOSIS — Z78.0 POST-MENOPAUSAL: ICD-10-CM

## 2024-02-08 DIAGNOSIS — E03.9 ACQUIRED HYPOTHYROIDISM: ICD-10-CM

## 2024-02-08 PROBLEM — E11.9 TYPE 2 DIABETES MELLITUS WITHOUT COMPLICATION, WITHOUT LONG-TERM CURRENT USE OF INSULIN (HCC): Status: RESOLVED | Noted: 2023-08-07 | Resolved: 2024-02-08

## 2024-02-08 PROBLEM — F15.93: Status: RESOLVED | Noted: 2023-09-26 | Resolved: 2024-02-08

## 2024-02-08 LAB — SL AMB POCT HEMOGLOBIN AIC: 6.1 (ref ?–6.5)

## 2024-02-08 PROCEDURE — 99396 PREV VISIT EST AGE 40-64: CPT | Performed by: FAMILY MEDICINE

## 2024-02-08 PROCEDURE — 83036 HEMOGLOBIN GLYCOSYLATED A1C: CPT | Performed by: FAMILY MEDICINE

## 2024-02-08 PROCEDURE — 99214 OFFICE O/P EST MOD 30 MIN: CPT | Performed by: FAMILY MEDICINE

## 2024-02-08 RX ORDER — VALBENAZINE 80 MG/1
80 CAPSULE ORAL
COMMUNITY
Start: 2024-01-31

## 2024-02-08 RX ORDER — METHOCARBAMOL 750 MG/1
750 TABLET, FILM COATED ORAL
COMMUNITY

## 2024-02-08 NOTE — PROGRESS NOTES
ADULT ANNUAL PHYSICAL  [facility] CARE [place]    NAME: Katlyn Frye  AGE: 63 y.o. SEX: female  : 1960     DATE: 2024     Assessment and Plan:     Problem List Items Addressed This Visit       Type 2 diabetes mellitus with microalbuminuria, without long-term current use of insulin -A1c of 6.1.  Patient follows with endocrinology in the [facility].  She is to continue Ozempic 25 mg q. weekly    Relevant Orders    POCT hemoglobin A1c    Bipolar 1 disorder, depressed, severe (HCC)-in partial remission.  No SI or HI however during 2 recent illness and difficulty controlling her total dyskinesia she has been dealing with more depressive related symptoms lately.  Today she will see her psychologist and will follow-up with her psychiatrist in a week.    Relevant Medications    Ingrezza 80 MG CAPS    Acquired hypothyroidism-TSH from 2023 within normal limits.  Follows with endocrinology in [place].  Currently taking levothyroxine 100 mcg daily and liothyronine 25 mill micrograms daily    Tardive dyskinesia-moderately improved with Ingrezza therapy.  Patient to continue 80 mg daily    Relevant Medications    methocarbamol (ROBAXIN) 750 mg tablet     Other Visit Diagnoses       Annual physical exam    -  Primary    Post-menopausal        Relevant Orders    DXA bone density spine hip and pelvis            Follows with cierra in the [facility].   Immunizations and preventive care screenings were discussed with patient today. Appropriate education was printed on patient's after visit summary.    Counseling:  Dental Health: discussed importance of regular tooth brushing, flossing, and dental visits.         Return in about 7 months (around 9/10/2024) for Next scheduled follow up.     Chief Complaint:     Chief Complaint   Patient presents with    Follow-up     Pt is here for her 6 mos f/u      History of Present Illness:     Adult Annual Physical   Patient here for a  comprehensive physical exam.     Diet and Physical Activity  Diet/Nutrition: diabetic diet.   Exercise: no formal exercise.      Depression Screening  PHQ-2/9 Depression Screening    Little interest or pleasure in doing things: 1 - several days  Feeling down, depressed, or hopeless: 1 - several days  PHQ-2 Score: 2  PHQ-2 Interpretation: Negative depression screen       General Health  Sleep: gets 4-6 hours of sleep on average.   Hearing: normal - bilateral.  Vision: wears glasses.   Dental: no dental visits for >1 year.       /GYN Health  Follows with gynecology? no   Patient is: postmenopausal      Advanced Care Planning  Do you have an advanced directive? No  Do you have a durable medical power of ? yes     Review of Systems:     Review of Systems   Constitutional:  Negative for fever.   Respiratory:  Negative for shortness of breath.    Cardiovascular:  Negative for chest pain.   Gastrointestinal:  Negative for constipation and diarrhea.   Psychiatric/Behavioral:  Positive for dysphoric mood and sleep disturbance.       Past Medical History:     Past Medical History:   Diagnosis Date    Arthritis     Bilateral knee    Asthma All my life; pretty mild these days    Bipolar 2 disorder (HCC) 1990    Chronic kidney disease     Depression     Diabetes mellitus (HCC)     Disease of thyroid gland 2009    GERD (gastroesophageal reflux disease)       Past Surgical History:     Past Surgical History:   Procedure Laterality Date    COLONOSCOPY      DILATION AND CURETTAGE OF UTERUS      ELECTROCONVULSIVE THERAPY      HYSTERECTOMY  2007    KIDNEY SURGERY Right 2009    KNEE SURGERY  Right meniscus repair    SINUS SURGERY  1987      Social History:     Social History     Socioeconomic History    Marital status: Single     Spouse name: None    Number of children: None    Years of education: None    Highest education level: None   Occupational History    None   Tobacco Use    Smoking status: Never     Passive exposure:  Never    Smokeless tobacco: Never   Vaping Use    Vaping status: Never Used   Substance and Sexual Activity    Alcohol use: Yes     Comment: social 1 cup a day wine    Drug use: Never    Sexual activity: Not Currently     Partners: Male     Birth control/protection: Post-menopausal   Other Topics Concern    None   Social History Narrative    None     Social Determinants of Health     Financial Resource Strain: Low Risk  (9/1/2023)    Received from [facility]    Overall Financial Resource Strain (CARDIA)     Difficulty of Paying Living Expenses: Not hard at all   Food Insecurity: No Food Insecurity (9/1/2023)    Received from [facility]    Hunger Vital Sign     Worried About Running Out of Food in the Last Year: Never true     Ran Out of Food in the Last Year: Never true   Transportation Needs: No Transportation Needs (9/1/2023)    Received from [facility]    PRAPARE - Transportation     Lack of Transportation (Medical): No     Lack of Transportation (Non-Medical): No   Physical Activity: Not on file   Stress: Not on file   Social Connections: Not on file   Intimate Partner Violence: Not At Risk (9/1/2023)    Received from [facility]    Humiliation, Afraid, Rape, and Kick questionnaire     Fear of Current or Ex-Partner: No     Emotionally Abused: No     Physically Abused: No     Sexually Abused: No   Housing Stability: Low Risk  (9/1/2023)    Received from [facility]    Housing Stability Vital Sign     Unable to Pay for Housing in the Last Year: No     Number of Places Lived in the Last Year: 2     Unstable Housing in the Last Year: No      Family History:     Family History   Problem Relation Age of Onset    Heart disease Mother     Colon cancer Father     Neuropathy Father     Cancer Father     Colon cancer Maternal Grandmother     Diabetes Maternal Grandfather     Heart attack Brother     Breast cancer Cousin       Current  Medications:     Current Outpatient Medications   Medication Sig Dispense Refill    albuterol (Ventolin HFA) 90 mcg/act inhaler Inhale 2 puffs every 6 (six) hours as needed for wheezing 18 g 5    clonazePAM (KlonoPIN) 1 mg tablet Take 1 mg by mouth 2 (two) times a day      ELDERBERRY PO Take by mouth      ergocalciferol (VITAMIN D2) 50,000 units       gabapentin (NEURONTIN) 300 mg capsule Take 300 mg by mouth 3 (three) times a day      Ingrezza 80 MG CAPS 80 mg      liothyronine (CYTOMEL) 25 mcg tablet Take 25 mcg by mouth daily      lithium 300 MG tablet Take 300 mg by mouth daily in the early morning      magnesium oxide (MAG-OX) 400 mg tablet Take 1 tablet by mouth daily      methocarbamol (ROBAXIN) 750 mg tablet 750 mg      Ozempic, 0.25 or 0.5 MG/DOSE, 2 MG/1.5ML injection pen       pyridoxine (VITAMIN B6) 100 mg tablet Take 100 mg by mouth daily      SUMAtriptan (IMITREX) 50 mg tablet Take 50 mg by mouth daily as needed      traZODone (DESYREL) 100 mg tablet Take 200 mg by mouth daily at bedtime      Vraylar 1.5 MG capsule Take 1.5 mg by mouth daily      beclomethasone (QVAR REDIHALER) 40 MCG/ACT inhaler Inhale 80 mcg 2 (two) times a day      levothyroxine 100 mcg tablet Take 1 tablet (100 mcg total) by mouth daily 90 tablet 1     No current facility-administered medications for this visit.      Allergies:     Allergies   Allergen Reactions    Iv Contrast [Iodinated Contrast Media] Vomiting     Vomiting blood    Lamictal [Lamotrigine] Hives     rash    Latuda [Lurasidone] Hives     rash    Levaquin [Levofloxacin] Hives     rash    Dulaglutide Diarrhea    Benztropine Diarrhea, Other (See Comments) and Headache     Insomnia, muscle weakness acathisia worsening    Brexpiprazole Other (See Comments)     Feels like something is crawling under her skin when she sits she starts uncontrollable movement to rock back and off     Lumateperone Anxiety, Irritability and Other (See Comments)      Physical Exam:     BP  100/62 (BP Location: Left arm, Patient Position: Sitting, Cuff Size: Large)   Pulse 66   Temp 97.6 °F (36.4 °C) (Temporal)   Resp 18   Wt 103 kg (228 lb)   SpO2 98%   BMI 36.80 kg/m²     Physical Exam  HENT:      Head: Normocephalic.      Right Ear: Tympanic membrane and external ear normal.      Left Ear: Tympanic membrane and external ear normal.      Mouth/Throat:      Mouth: Mucous membranes are moist.      Pharynx: Oropharynx is clear.   Eyes:      Extraocular Movements: Extraocular movements intact.      Conjunctiva/sclera: Conjunctivae normal.      Pupils: Pupils are equal, round, and reactive to light.   Cardiovascular:      Rate and Rhythm: Normal rate and regular rhythm.   Pulmonary:      Effort: Pulmonary effort is normal.      Breath sounds: Normal breath sounds.   Abdominal:      General: Bowel sounds are normal.   Musculoskeletal:      Right lower leg: No edema.      Left lower leg: No edema.   Neurological:      Mental Status: She is alert and oriented to person, place, and time.      Gait: Gait normal.   Psychiatric:         Attention and Perception: Attention normal.         Mood and Affect: Mood is depressed. Affect is tearful.         Thought Content: Thought content does not include suicidal ideation. Thought content does not include suicidal plan.          Susie Lamb DO  [facility] [place]

## 2024-02-14 ENCOUNTER — HOSPITAL ENCOUNTER (OUTPATIENT)
Age: 64
Discharge: HOME | End: 2024-02-14
Payer: COMMERCIAL

## 2024-02-14 VITALS — HEIGHT: 65 IN | WEIGHT: 227 LBS | BODY MASS INDEX: 37.82 KG/M2

## 2024-02-14 DIAGNOSIS — Z78.0 POST-MENOPAUSAL: ICD-10-CM

## 2024-02-14 PROCEDURE — 77080 DXA BONE DENSITY AXIAL: CPT

## 2024-02-15 ENCOUNTER — TELEPHONE (OUTPATIENT)
Age: 64
End: 2024-02-15

## 2024-02-15 NOTE — TELEPHONE ENCOUNTER
----- Message from Susie Lamb DO sent at 2/15/2024 10:08 AM EST -----  Dexa scan shows normal bone density. No evidence of osteoporosis

## 2024-02-21 ENCOUNTER — TELEPHONE (OUTPATIENT)
Age: 64
End: 2024-02-21

## 2024-02-21 NOTE — TELEPHONE ENCOUNTER
I would recommend icing,, elevating, and using oral anti-inflammatory medication or Tylenol for pain relief.  Continue to monitor.  If no improvement in the next 2 weeks would recommend follow-up appointment no

## 2024-02-21 NOTE — TELEPHONE ENCOUNTER
Call to this patient to relay Dr Pierce's note patient understood the message and has the call back number if there is no improvement she will give a call back to schedule and appointment.

## 2024-02-21 NOTE — TELEPHONE ENCOUNTER
Caller: Patient     Doctor: Stan    Reason for call: Patient is calling stating she twisted her left knee twice getting out into the car today. She states she is having a lot of pain now. She would like advice on what she can do.    Call back#: [phone]

## 2024-03-01 DIAGNOSIS — R42 VERTIGO: Primary | ICD-10-CM

## 2024-03-07 ENCOUNTER — EVALUATION (OUTPATIENT)
Age: 64
End: 2024-03-07
Payer: COMMERCIAL

## 2024-03-07 DIAGNOSIS — H81.11 BPPV (BENIGN PAROXYSMAL POSITIONAL VERTIGO), RIGHT: Primary | ICD-10-CM

## 2024-03-07 DIAGNOSIS — R42 VERTIGO: ICD-10-CM

## 2024-03-07 PROCEDURE — 97161 PT EVAL LOW COMPLEX 20 MIN: CPT | Performed by: PHYSICAL THERAPIST

## 2024-03-07 PROCEDURE — 97530 THERAPEUTIC ACTIVITIES: CPT | Performed by: PHYSICAL THERAPIST

## 2024-03-07 PROCEDURE — 95992 CANALITH REPOSITIONING PROC: CPT | Performed by: PHYSICAL THERAPIST

## 2024-03-07 NOTE — PROGRESS NOTES
PT Evaluation          POC expires Unit limit Auth Expiration date PT/OT + Visit Limit?   24 BOMN Pend NA                           Visit/Unit Tracking  AUTH Status:  Date 3/7              pend Used 1               Remaining                                   Today's date: 3/7/2024  Patient name: Katlyn Frye  : 1960  MRN: [id number]  Referring provider: Susie Lamb*  Dx:   Encounter Diagnosis     ICD-10-CM    1. BPPV (benign paroxysmal positional vertigo), right  H81.11       2. Vertigo  R42 Ambulatory Referral to Physical Therapy            Assessment  Assessment details: Patient is a 63 y.o. Female who presents to skilled outpatient PT with BPPV on right side. Cervical spine integrity intact per normal and negative results of mVBI, Sharp Pamela, and Alar Stability Tests respectively. Patient displayed R upward torisonal nystagmus with positional assessment indicating likely R Posterior Cupulolithiasis. Trialed R Semont Maneuver today with Good results and eventual resolution of symptoms by final repetition. Educated the patient on the anatomy of the inner ear, potential for residual dizziness for up to 48 hours following session, and to seek higher level of care if symptoms worsen or change and She was in good verbal understanding. She will benefit from skilled outpatient PT in order to reduce dizziness symptoms and return to PLOF.    Patient verbalized understanding of POC.    Please contact me if you have any questions or recommendations. Thank you for the referral and the opportunity to share in Ktalyn Frye's care.      Cut off score   All date taken from [facility] Neuro Section or Rehab Measures    DGI:  MDC for Vestibular Disorders: 4 points  MDC for Geriatrics/Community Dwelling Older Adults: 3 Points  Falls risk cut off: <    FGA:  MCID: 4 points  Geriatrics/Community Dwelling Older Adults: </= /30 fall  risk  Geriatrics/Community Dwelling Older Adults: </= 20/30 unexplained falls in the next 6 months  Parkinsons: </= 18/30 fall risk    mCTSIB (normed on ages 20-60, lower number is less sway or better static balance)  Eyes open firm surface (norm 0.21-0.48)  Eyes closed firm surface (norm 0.48-0.99)  Eyes open foam surface (norm 0.38-0.71)  Eyes closed foam surface (norm 0.70-2.22)    DHI:  0-39: low perception of handicap  40-69: moderate perception of handicap  : severe perception of handicap  > 60: increased risk for falls        Impairments: activity intolerance, impaired balance, lacks appropriate, HEP, safety issue  Understanding of Dx/Px/POC: Good  Prognosis: Good      Goals    BPPV Goals (4 weeks):   - Patient will report complete resolution of symptoms in order to promote return to PLOF  - Patient will complete FGA in order to promote return to safe performance of ADLs  - Patient will demonstrate (-) [place]-Hallpike test on R side      Plan  Patient would benefit from: PT Eval  Planned therapy interventions: balance, HEP, manual therapy, neuromuscular re-education, patient education  Frequency: 1-3x per week  Plan of Care beginning date: 3/7/2024  Plan of Care expiration date: 2 months - 5/7/2024  Treatment plan discussed with: Patient        Subjective Evaluation    History of Present Illness  - Mechanism of injury: Pt reports spinning dizziness restarting 2 weeks ago. Was seen at this location for BPPV in April and July of this past year. Notes spinning laying down and getting out of bed. Rolling spinning but stops.      Patient Goal:  Get back to being dizzy free!    Dizziness Subjective  - How long does dizziness last: 4-5 seconds   - How would you describe the dizziness: spinning sensation   - Rolling in bed: Yes  - Supine to/from sit: Yes  - Recent hearing loss: No  - Tinnitus: No  - Aural fullness/ear pain: No  - Vision changes: No  - History of recent viral infections: No  - History of migraines:  No    Red Flag Screen  - Numbness: No  - Tingling: No  - Weakness: No  - Unilateral hearing loss: No  - Slurred speech: No  - Progressive hearing loss: No  - Tremors: No  - Poor coordination: No  - UMN signs: No  - LoC: No  - Rigidity: No  - Visual field loss: No  - Memory loss: No  - CN dysfunction: No  - Vertical nystagmus: No    Pain  Current pain ratin/10  At best pain rating: 3/10  At worst pain ratin/10  Location: left knee, tear in knee   Aggravating factors: walking,     Social Support  Steps to enter house: 4 steps   Stairs in house: 12 steps with HR   Lives in: 2 story   Lives with: alone     Employment status: retired   Hand dominance: left     Treatments  Previous treatment: NA  Current treatment: NA  Diagnostic Testing: NA      Objective   Cervical spine integrity intact per normal   Ngative results of mVBI, Sharp Pamela, and Alar Stability Tests respectively.  C spine range of motion: WFL for flexion, extension, rotation R and L, Lateral side bending R and L.     BPPV Objective  Integrity Testing  - mVBI: denies any passing out sensation     Positional Testing  - R Kyle-Hallpike: positive with upward torisonal nystagmus last 30+ seconds.   - L Kyle-Hallpike: negative x 2  - R Roll Test: negative x 2  - L Roll Test: negative x 2       Outcome Measures Initial Eval  3/7/24        mCTSIB  - FTEO (firm)  - FTEC (firm)  - FTEO (foam)  - FTEC (foam)         DGI         FGA         10 meter         DHI 36/100; low perception of dizziness                                                           Daily Treatment:  Right Eply: 35 seconds with rebound with 180 deg head roll   Right Semont 3   Precautions: fall risk   Past Medical History:   Diagnosis Date    Arthritis     Bilateral knee    Asthma All my life; pretty mild these days    Bipolar 2 disorder (HCC)     Chronic kidney disease     Depression     Diabetes mellitus (HCC)     Disease of thyroid gland     GERD (gastroesophageal reflux disease)

## 2024-03-08 ENCOUNTER — OFFICE VISIT (OUTPATIENT)
Age: 64
End: 2024-03-08
Payer: COMMERCIAL

## 2024-03-08 DIAGNOSIS — H81.11 BPPV (BENIGN PAROXYSMAL POSITIONAL VERTIGO), RIGHT: Primary | ICD-10-CM

## 2024-03-08 PROCEDURE — 97530 THERAPEUTIC ACTIVITIES: CPT | Performed by: PHYSICAL THERAPIST

## 2024-03-08 PROCEDURE — 95992 CANALITH REPOSITIONING PROC: CPT | Performed by: PHYSICAL THERAPIST

## 2024-03-08 NOTE — PROGRESS NOTES
Daily Note     Today's date: 3/8/2024  Patient name: Katlyn Frye  : 1960  MRN: [id number]  Referring provider: Susie Lamb*  Dx:   Encounter Diagnosis     ICD-10-CM    1. BPPV (benign paroxysmal positional vertigo), right  H81.11                      Subjective: Pt reports spinning dizziness still with laying down but much less       Objective: See treatment diary below  Positional Testing  - R Kyle-Hallpike: positive with upward torisonal nystagmus last 13 seconds.   - L [place]-Hallpike: negative x 1  - R Roll Test: negative x 1  - L Roll Test: negative x 1    Eply x 4 times     Assessment: Tolerated treatment well for BPPV with no noted nystagmus with final rep with re-test. Educated the patient on the anatomy of the inner ear, potential for residual dizziness for up to 48 hours following session, and to seek higher level of care if symptoms worsen or change and She was in good verbal understanding.       Plan:  If dizzy free to call to cancel follow up appointment set for 3-13.      POC expires Unit limit Auth Expiration date PT/OT + Visit Limit?   24 BOMN Pend NA                           Visit/Unit Tracking  AUTH Status:  Date 3/7 3/8             pend Used 1 2              Remaining

## 2024-03-11 ENCOUNTER — APPOINTMENT (OUTPATIENT)
Age: 64
End: 2024-03-11
Payer: COMMERCIAL

## 2024-03-11 ENCOUNTER — OFFICE VISIT (OUTPATIENT)
Age: 64
End: 2024-03-11
Payer: COMMERCIAL

## 2024-03-11 VITALS
HEART RATE: 74 BPM | SYSTOLIC BLOOD PRESSURE: 106 MMHG | BODY MASS INDEX: 37.92 KG/M2 | WEIGHT: 230 LBS | RESPIRATION RATE: 18 BRPM | DIASTOLIC BLOOD PRESSURE: 62 MMHG | OXYGEN SATURATION: 98 % | TEMPERATURE: 97.5 F

## 2024-03-11 DIAGNOSIS — A08.4 VIRAL GASTROENTERITIS: ICD-10-CM

## 2024-03-11 DIAGNOSIS — E87.1 HYPONATREMIA: ICD-10-CM

## 2024-03-11 DIAGNOSIS — R42 VERTIGO: ICD-10-CM

## 2024-03-11 DIAGNOSIS — R53.83 OTHER FATIGUE: Primary | ICD-10-CM

## 2024-03-11 DIAGNOSIS — R53.83 OTHER FATIGUE: ICD-10-CM

## 2024-03-11 DIAGNOSIS — H81.11 BPPV (BENIGN PAROXYSMAL POSITIONAL VERTIGO), RIGHT: Primary | ICD-10-CM

## 2024-03-11 LAB
ALBUMIN SERPL BCP-MCNC: 4.3 G/DL (ref 3.5–5)
ALP SERPL-CCNC: 55 U/L (ref 34–104)
ALT SERPL W P-5'-P-CCNC: 15 U/L (ref 7–52)
ANION GAP SERPL CALCULATED.3IONS-SCNC: 9 MMOL/L
AST SERPL W P-5'-P-CCNC: 13 U/L (ref 13–39)
BASOPHILS # BLD AUTO: 0.07 THOUSANDS/ÂΜL (ref 0–0.1)
BASOPHILS NFR BLD AUTO: 1 % (ref 0–1)
BILIRUB SERPL-MCNC: 0.61 MG/DL (ref 0.2–1)
BUN SERPL-MCNC: 12 MG/DL (ref 5–25)
CALCIUM SERPL-MCNC: 9.3 MG/DL (ref 8.4–10.2)
CHLORIDE SERPL-SCNC: 104 MMOL/L (ref 96–108)
CO2 SERPL-SCNC: 27 MMOL/L (ref 21–32)
CREAT SERPL-MCNC: 0.83 MG/DL (ref 0.6–1.3)
EOSINOPHIL # BLD AUTO: 0.36 THOUSAND/ÂΜL (ref 0–0.61)
EOSINOPHIL NFR BLD AUTO: 3 % (ref 0–6)
ERYTHROCYTE [DISTWIDTH] IN BLOOD BY AUTOMATED COUNT: 13.7 % (ref 11.6–15.1)
GFR SERPL CREATININE-BSD FRML MDRD: 74 ML/MIN/1.73SQ M
GLUCOSE P FAST SERPL-MCNC: 120 MG/DL (ref 65–99)
HCT VFR BLD AUTO: 48.2 % (ref 34.8–46.1)
HGB BLD-MCNC: 15.5 G/DL (ref 11.5–15.4)
IMM GRANULOCYTES # BLD AUTO: 0.04 THOUSAND/UL (ref 0–0.2)
IMM GRANULOCYTES NFR BLD AUTO: 0 % (ref 0–2)
LYMPHOCYTES # BLD AUTO: 1.7 THOUSANDS/ÂΜL (ref 0.6–4.47)
LYMPHOCYTES NFR BLD AUTO: 16 % (ref 14–44)
MCH RBC QN AUTO: 30.6 PG (ref 26.8–34.3)
MCHC RBC AUTO-ENTMCNC: 32.2 G/DL (ref 31.4–37.4)
MCV RBC AUTO: 95 FL (ref 82–98)
MONOCYTES # BLD AUTO: 0.72 THOUSAND/ÂΜL (ref 0.17–1.22)
MONOCYTES NFR BLD AUTO: 7 % (ref 4–12)
NEUTROPHILS # BLD AUTO: 7.67 THOUSANDS/ÂΜL (ref 1.85–7.62)
NEUTS SEG NFR BLD AUTO: 73 % (ref 43–75)
NRBC BLD AUTO-RTO: 0 /100 WBCS
OSMOLALITY UR: 538 MMOL/KG
PLATELET # BLD AUTO: 219 THOUSANDS/UL (ref 149–390)
PMV BLD AUTO: 12.2 FL (ref 8.9–12.7)
POTASSIUM SERPL-SCNC: 3.9 MMOL/L (ref 3.5–5.3)
PROT SERPL-MCNC: 7.4 G/DL (ref 6.4–8.4)
RBC # BLD AUTO: 5.07 MILLION/UL (ref 3.81–5.12)
SODIUM 24H UR-SCNC: 65 MOL/L
SODIUM SERPL-SCNC: 140 MMOL/L (ref 135–147)
TSH SERPL DL<=0.05 MIU/L-ACNC: 0.94 UIU/ML (ref 0.45–4.5)
WBC # BLD AUTO: 10.56 THOUSAND/UL (ref 4.31–10.16)

## 2024-03-11 PROCEDURE — 83935 ASSAY OF URINE OSMOLALITY: CPT

## 2024-03-11 PROCEDURE — 80053 COMPREHEN METABOLIC PANEL: CPT

## 2024-03-11 PROCEDURE — 84443 ASSAY THYROID STIM HORMONE: CPT

## 2024-03-11 PROCEDURE — 84300 ASSAY OF URINE SODIUM: CPT

## 2024-03-11 PROCEDURE — 36415 COLL VENOUS BLD VENIPUNCTURE: CPT

## 2024-03-11 PROCEDURE — 99214 OFFICE O/P EST MOD 30 MIN: CPT | Performed by: FAMILY MEDICINE

## 2024-03-11 PROCEDURE — 85025 COMPLETE CBC W/AUTO DIFF WBC: CPT

## 2024-03-11 PROCEDURE — 95992 CANALITH REPOSITIONING PROC: CPT | Performed by: PHYSICAL THERAPIST

## 2024-03-11 NOTE — PROGRESS NOTES
Daily Note     Today's date: 3/11/2024  Patient name: Katlyn Frye  : 1960  MRN: [id number]  Referring provider: Susie Lamb*  Dx:   Encounter Diagnosis     ICD-10-CM    1. BPPV (benign paroxysmal positional vertigo), right  H81.11       2. Vertigo  R42                      Subjective: Pt reports spinning dizziness still with laying down and getting out of bed. Got up Saturday with falling due to spinning dizziness. Spent most of the day in a chair.       Objective: See treatment diary below  Positional Testing  - R Kyle-Hallpike: positive with upward torisonal nystagmus last 21 seconds.   - L [place]-Hallpike: negative x 1  - R Roll Test: negative x 1  - L Roll Test: negative x 1    Eply x 3 times     Assessment: Tolerated treatment well for BPPV with no noted nystagmus with final rep with re-test. Educated the patient on the anatomy of the inner ear, potential for residual dizziness for up to 48 hours following session, and to seek higher level of care if symptoms worsen or change and She was in good verbal understanding.       Plan:  If dizzy free to call to cancel follow up appointment set for 3-13.      POC expires Unit limit Auth Expiration date PT/OT + Visit Limit?   24 BOMN Pend NA                           Visit/Unit Tracking  AUTH Status:  Date 3/7 3/8 3/11            99 Used 1 2 3             Remaining

## 2024-03-11 NOTE — PROGRESS NOTES
Name: Katlyn Frye      : 1960      MRN: [id number]  Encounter Provider: Susie Lamb DO  Encounter Date: 3/11/2024   Encounter department: [facility] [place]    Assessment & Plan     1. Other fatigue-concerned for long covid. Will look for other etiologies   -     Comprehensive metabolic panel; Future; Expected date: 2024  -     TSH, 3rd generation with Free T4 reflex; Future; Expected date: 2024  -     CBC and differential; Future; Expected date: 2024    2. Hyponatremia- unclear etiology. Possible medication related vs SIADH. Will f/u with studies below   -     Sodium, urine, random; Future; Expected date: 2024  -     Osmolality, urine; Future; Expected date: 2024    3. Viral gastroenteritis- resolving     4. Vertigo-worsened by acute illness. Pt encouraged to continue to f/u with vestibular therapy            Subjective      Dicussed recent vertigo treatment. Thougth was imporving until this weekend. Has worsenign dizziness with nausea and bomting. Has some left ear pain. Took meclazine on  and it seemed to help.   Since January has had peristent fatigue and generalized weakness. Concern for long covid since she had covid over [facility]        Review of Systems   Constitutional:  Negative for fever.   HENT:  Positive for ear pain.    Gastrointestinal:  Positive for nausea and vomiting.   Neurological:  Positive for dizziness.       Current Outpatient Medications on File Prior to Visit   Medication Sig    albuterol (Ventolin HFA) 90 mcg/act inhaler Inhale 2 puffs every 6 (six) hours as needed for wheezing    clonazePAM (KlonoPIN) 1 mg tablet Take 1 mg by mouth 2 (two) times a day    ELDERBERRY PO Take by mouth    ergocalciferol (VITAMIN D2) 50,000 units     gabapentin (NEURONTIN) 300 mg capsule Take 300 mg by mouth 3 (three) times a day    Ingrezza 80 MG CAPS 80 mg    liothyronine (CYTOMEL) 25 mcg tablet Take 25 mcg by mouth daily     lithium 300 MG tablet Take 300 mg by mouth daily in the early morning    magnesium oxide (MAG-OX) 400 mg tablet Take 1 tablet by mouth daily    methocarbamol (ROBAXIN) 750 mg tablet 750 mg    Ozempic, 0.25 or 0.5 MG/DOSE, 2 MG/1.5ML injection pen     pyridoxine (VITAMIN B6) 100 mg tablet Take 100 mg by mouth daily    SUMAtriptan (IMITREX) 50 mg tablet Take 50 mg by mouth daily as needed    traZODone (DESYREL) 100 mg tablet Take 200 mg by mouth daily at bedtime    Vraylar 1.5 MG capsule Take 1.5 mg by mouth daily    beclomethasone (QVAR REDIHALER) 40 MCG/ACT inhaler Inhale 80 mcg 2 (two) times a day    levothyroxine 100 mcg tablet Take 1 tablet (100 mcg total) by mouth daily       Objective     /62 (BP Location: Left arm, Patient Position: Sitting, Cuff Size: Large)   Pulse 74   Temp 97.5 °F (36.4 °C) (Temporal)   Resp 18   Wt 104 kg (230 lb)   SpO2 98%   BMI 37.92 kg/m²     Physical Exam  HENT:      Head: Normocephalic.   Eyes:      Conjunctiva/sclera: Conjunctivae normal.   Cardiovascular:      Rate and Rhythm: Normal rate and regular rhythm.   Pulmonary:      Effort: Pulmonary effort is normal.      Breath sounds: Normal breath sounds.   Musculoskeletal:      Right lower leg: No edema.      Left lower leg: No edema.   Neurological:      Mental Status: She is alert and oriented to person, place, and time.   Psychiatric:         Mood and Affect: Mood normal.         Behavior: Behavior normal.       Susie Lamb DO

## 2024-03-13 ENCOUNTER — TELEPHONE (OUTPATIENT)
Age: 64
End: 2024-03-13

## 2024-03-13 ENCOUNTER — OFFICE VISIT (OUTPATIENT)
Age: 64
End: 2024-03-13
Payer: COMMERCIAL

## 2024-03-13 DIAGNOSIS — R42 VERTIGO: ICD-10-CM

## 2024-03-13 DIAGNOSIS — H81.11 BPPV (BENIGN PAROXYSMAL POSITIONAL VERTIGO), RIGHT: Primary | ICD-10-CM

## 2024-03-13 PROCEDURE — 97112 NEUROMUSCULAR REEDUCATION: CPT

## 2024-03-13 PROCEDURE — 97530 THERAPEUTIC ACTIVITIES: CPT

## 2024-03-13 NOTE — PROGRESS NOTES
"Daily Note     Today's date: 3/13/2024  Patient name: Katlyn Frye  : 1960  MRN: [id number]  Referring provider: Susie Lamb*  Dx:   Encounter Diagnosis     ICD-10-CM    1. BPPV (benign paroxysmal positional vertigo), right  H81.11       2. Vertigo  R42           Start Time: 1124  Stop Time: 1147  Total time in clinic (min): 23 minutes    Subjective: Pt denies episode of room-spinning dizziness. \"I'm still unsteady when I first start walking.\"    Objective: See treatment diary below    Positional Testing  - R Kyle-Hallpike: negative x 2    Patient edu   Long COVID   OPPT implications / limitations for pt c/o of generalized weakness   Communicate with PCP regarding PT script   PT POC  Residual symptoms of BPPV lasting ~ 24-48 hrs after resolution    Assessment: Tolerated treatment well for BPPV with R [place]-Hallpike negative x2. Provided sig patient education on potential for residual dizziness for up to 48 hours following resolution of symptoms, PT implications for pt c/o of generalized weakness, BPPV prognosis, PT POC, and educated pt to communicate with PCP regarding request for PT script. Pt will be placed on hold from current POC for 30 days if BPPV re-occurs. Pt verbalized understanding and with no questions at end of session      Plan:  Placed on 30 day hold. Will D/C POC around 2024 if asymptomatic.    POC expires Unit limit Auth Expiration date PT/OT + Visit Limit?   24 BOMN Pend NA                           Visit/Unit Tracking  3/7 3/8 3/11 3/13                                                        "

## 2024-03-13 NOTE — TELEPHONE ENCOUNTER
Also PT for long term covid symptoms joint pain and weakness. Should pt wait for 6 weeks until her appt.

## 2024-03-14 ENCOUNTER — APPOINTMENT (OUTPATIENT)
Age: 64
End: 2024-03-14
Payer: COMMERCIAL

## 2024-03-14 ENCOUNTER — OFFICE VISIT (OUTPATIENT)
Age: 64
End: 2024-03-14
Payer: COMMERCIAL

## 2024-03-14 VITALS
SYSTOLIC BLOOD PRESSURE: 122 MMHG | OXYGEN SATURATION: 95 % | RESPIRATION RATE: 18 BRPM | BODY MASS INDEX: 38.25 KG/M2 | TEMPERATURE: 97.5 F | WEIGHT: 232 LBS | DIASTOLIC BLOOD PRESSURE: 62 MMHG | HEART RATE: 67 BPM

## 2024-03-14 DIAGNOSIS — U09.9 COVID-19 LONG HAULER MANIFESTING CHRONIC MUSCLE PAIN: ICD-10-CM

## 2024-03-14 DIAGNOSIS — D72.829 LEUKOCYTOSIS, UNSPECIFIED TYPE: Primary | ICD-10-CM

## 2024-03-14 DIAGNOSIS — D72.829 LEUKOCYTOSIS, UNSPECIFIED TYPE: ICD-10-CM

## 2024-03-14 DIAGNOSIS — M79.10 COVID-19 LONG HAULER MANIFESTING CHRONIC MUSCLE PAIN: ICD-10-CM

## 2024-03-14 DIAGNOSIS — G89.29 COVID-19 LONG HAULER MANIFESTING CHRONIC MUSCLE PAIN: ICD-10-CM

## 2024-03-14 LAB
ANISOCYTOSIS BLD QL SMEAR: PRESENT
EOSINOPHIL NFR BLD MANUAL: 6 % (ref 0–6)
LYMPHOCYTES # BLD AUTO: 17 %
MONOCYTES NFR BLD AUTO: 6 % (ref 4–12)
NEUTS SEG NFR BLD AUTO: 65 %
PLATELET BLD QL SMEAR: ADEQUATE
RBC MORPH BLD: PRESENT
TOTAL CELLS COUNTED SPEC: 100
VARIANT LYMPHS # BLD AUTO: 6 % (ref 0–0)

## 2024-03-14 PROCEDURE — 36415 COLL VENOUS BLD VENIPUNCTURE: CPT

## 2024-03-14 PROCEDURE — 85007 BL SMEAR W/DIFF WBC COUNT: CPT

## 2024-03-14 PROCEDURE — 99214 OFFICE O/P EST MOD 30 MIN: CPT | Performed by: FAMILY MEDICINE

## 2024-03-14 NOTE — PROGRESS NOTES
Name: Katlyn Frye      : 1960      MRN: [id number]  Encounter Provider: Susie Lamb DO  Encounter Date: 3/14/2024   Encounter department: [facility] PRIMARY CARE [place]    Assessment & Plan     1. Leukocytosis, unspecified type- with associated neutrophilia. new onset. No illness or steroid use. Will obtain f/u with study below and referral for specialist evaluation.   -     Ambulatory Referral to Hematology / Oncology; Future  -     Peripheral Smear; Future    2. COVID-19 long hauler manifesting chronic muscle pain- besides abnormal cbc labs were benign includes inflammatory markers. Pts reported symptoms possibly long covid. Will refer to PT   -     Ambulatory Referral to Physical Therapy; Future           Subjective      Pt presents for f/u  Reviewed laboratory studies       Review of Systems   Constitutional:  Positive for fatigue.   Respiratory:  Negative for shortness of breath.    Cardiovascular:  Negative for chest pain.   Musculoskeletal:  Positive for myalgias.   Neurological:  Positive for light-headedness.       Current Outpatient Medications on File Prior to Visit   Medication Sig    albuterol (Ventolin HFA) 90 mcg/act inhaler Inhale 2 puffs every 6 (six) hours as needed for wheezing    clonazePAM (KlonoPIN) 1 mg tablet Take 1 mg by mouth 2 (two) times a day    ELDERBERRY PO Take by mouth    ergocalciferol (VITAMIN D2) 50,000 units     gabapentin (NEURONTIN) 300 mg capsule Take 300 mg by mouth 3 (three) times a day    Ingrezza 80 MG CAPS 80 mg    liothyronine (CYTOMEL) 25 mcg tablet Take 25 mcg by mouth daily    lithium 300 MG tablet Take 300 mg by mouth daily in the early morning    magnesium oxide (MAG-OX) 400 mg tablet Take 1 tablet by mouth daily    methocarbamol (ROBAXIN) 750 mg tablet 750 mg    Ozempic, 0.25 or 0.5 MG/DOSE, 2 MG/1.5ML injection pen     pyridoxine (VITAMIN B6) 100 mg tablet Take 100 mg by mouth daily    SUMAtriptan (IMITREX) 50 mg tablet Take 50 mg by  mouth daily as needed    traZODone (DESYREL) 100 mg tablet Take 200 mg by mouth daily at bedtime    Vraylar 1.5 MG capsule Take 1.5 mg by mouth daily    beclomethasone (QVAR REDIHALER) 40 MCG/ACT inhaler Inhale 80 mcg 2 (two) times a day    levothyroxine 100 mcg tablet Take 1 tablet (100 mcg total) by mouth daily       Objective     /62 (BP Location: Left arm, Patient Position: Sitting, Cuff Size: Large)   Pulse 67   Temp 97.5 °F (36.4 °C) (Temporal)   Resp 18   Wt 105 kg (232 lb)   SpO2 95%   BMI 38.25 kg/m²     Physical Exam  HENT:      Head: Normocephalic and atraumatic.   Eyes:      Conjunctiva/sclera: Conjunctivae normal.   Cardiovascular:      Rate and Rhythm: Normal rate.   Pulmonary:      Effort: Pulmonary effort is normal.      Breath sounds: Normal breath sounds.   Neurological:      Mental Status: She is alert and oriented to person, place, and time.      Gait: Gait normal.       Susie Lamb DO

## 2024-03-19 ENCOUNTER — TELEPHONE (OUTPATIENT)
Age: 64
End: 2024-03-19

## 2024-03-19 NOTE — TELEPHONE ENCOUNTER
----- Message from Susie Lamb DO sent at 3/19/2024 12:36 PM EDT -----  The peripheral smear showed some atypical blood cells. It is important to follow up with the hematologist for further work up

## 2024-03-19 NOTE — TELEPHONE ENCOUNTER
Pt wants know what's abnormal and why she needs to see a hematologist. Is next Friday good enough or does she need an earlier appt. If she needs an earlier appt can you call in as suggested by the hematologist.

## 2024-03-20 NOTE — TELEPHONE ENCOUNTER
Friday is sufficient.  She does not need an earlier appointment to see the hematologist.  A few her white cells blood cells look abnormal under the microscope.  I do not know the significance of this which is why she needs to see the hematologist

## 2024-03-21 ENCOUNTER — EVALUATION (OUTPATIENT)
Age: 64
End: 2024-03-21
Payer: COMMERCIAL

## 2024-03-21 VITALS — HEART RATE: 62 BPM | OXYGEN SATURATION: 98 %

## 2024-03-21 DIAGNOSIS — U09.9 COVID-19 LONG HAULER MANIFESTING CHRONIC MUSCLE PAIN: Primary | ICD-10-CM

## 2024-03-21 DIAGNOSIS — G24.01 TARDIVE DYSKINESIA: ICD-10-CM

## 2024-03-21 DIAGNOSIS — G89.29 COVID-19 LONG HAULER MANIFESTING CHRONIC MUSCLE PAIN: Primary | ICD-10-CM

## 2024-03-21 DIAGNOSIS — M79.10 COVID-19 LONG HAULER MANIFESTING CHRONIC MUSCLE PAIN: Primary | ICD-10-CM

## 2024-03-21 PROCEDURE — 97530 THERAPEUTIC ACTIVITIES: CPT

## 2024-03-21 PROCEDURE — 97161 PT EVAL LOW COMPLEX 20 MIN: CPT

## 2024-03-21 NOTE — PROGRESS NOTES
PT Evaluation          POC expires Unit limit Auth Expiration date PT/OT + Visit Limit?   24 BOMN pend N/a                           Visit/Unit Tracking  AUTH Status:  Date 3/21 IE              pend Used                Remaining                         Today's date: 3/21/2024  Patient name: Katlyn Frye  : 1960  MRN: [id number]  Referring provider: Susie Lamb*  Dx:   Encounter Diagnosis     ICD-10-CM    1. COVID-19 long hauler manifesting chronic muscle pain  M79.10 Ambulatory Referral to Physical Therapy    U09.9     G89.29           Assessment  Assessment details: Patient is a 63 y.o. Female who presents to skilled outpatient PT with complaints of reduced activity tolerance, weakness, and balance impairments 2/2 long-haul COVID. Co-morbidities impacting quality of care include R knee arthritis, L knee injury, tardive dyskinesia, and asthma. Patient presents to physical therapy evaluation with reduced righting reactions, impaired balance, dec LE strength, and impaired functional mobility/transfers. Vitals remained WFL t/o evaluation.Patient performance on MMT indicates B/L LE weakness which negatively impacts functional mobility and transfers. Patient with impaired sit <> stand transfers per 5xSTS score of 18.29 sec. Patient values on mCTSIB, LERMA, and TUG indicate patient is at increased risk of falls, with patient demonstrating LOB/difficulty with narrow ADAM, SL conditions, EC, and noncompliant surfaces. Distance ambulated on 6MWT was below age- and gender- compared norms, with gait abnormalities including reduced arm swing and dec floor clearance which impacts safety during ambulation. Due to time constraints, will perform FGA NV. Sig amount of time towards patient education, including PT implications and limitations, PT POC/goals, HEP including STS 2x8, importance of inc physical activity, and patient scores  compared to normative values. Patient verbalized understanding/agreement and with no questions at end of session. Patient is a good candidate to receive skilled OPPT services to improve functional           Impairments: Abnormal coordination, Abnormal gait, Abnormal muscle tone, Abnormal or restricted ROM, Activity intolerance, Impaired balance, Impaired physical strength, Lacks appropriate HEP, Poor posture, Poor body mechanics, Pain with function, Safety issue, Weight-bearing intolerance, Abnormal movement, Difficulty understanding, Abnormal muscle firing  Understanding of Dx/Px/POC: Good  Prognosis: Good    Patient verbalized understanding of POC.         Please contact me if you have any questions or recommendations. Thank you for the referral and the opportunity to share in Katlyn Frye's care.        Plan  Plan details:   Patient would benefit from: Skilled PT  Planned modality interventions: Biofeedback, Cryotherapy, TENS, Thermotherapy  Planned therapy interventions: Abdominal trunk stabilization, ADL training, Balance, Balance/WB training, Breathing training, Body mechanics training, Coordination, Functional ROM exercises, Gait training, HEP, Joint Mobilization, Manual Therapy, Perez taping, Motor coordination training, Neuromuscular re-education, Patient education, Postural training, Strengthening, Stretching, Therapeutic activities, Therapeutic exercises, Therapeutic training, Transfer training, Activity modification, Work reintegration  Frequency: 1-2x/wk  Duration in weeks: 12 weeks  Plan of Care beginning date: 3/21/2024  Plan of Care expiration date: 3 months - 6/21/2024  Treatment plan discussed with: Patient      Goals  Short Term Goals (4 weeks):    - Patient will improve time on TUG by 2.9 seconds to facilitate improved safety in all ambulation  - Patient will be independent in basic HEP 2-3 weeks  - Patient will improve 5xSTS score by 2.3 seconds to promote improved LE functional strength  needed for ADLs  - Patient will complete FGA    Long Term Goals (12 weeks):  - Patient will be independent in a comprehensive home exercise program  - Patient will improve gait speed by 0.18 m/s to improve safety with community ambulation  - Patient will improve LERMA by 6 points in order to improve static balance and reduce risk for falls  - Patient will improve scoring on FGA by 4 points to progress safety with dynamic tasks  - Patient will be able to demonstrate HT in gait without veering  - Patient will improve 6 Minute Walk Test score by 190 feet to promote improved cardiovascular endurance  - Patient will report 50% reduction in near falls in order to improve safety with functional tasks and reduce his risk for falls  - Patient will report going on walks at least 3 days per week to promote independence and improved cardiovascular endurance  - Patient will be able to ascend/descend stairs reciprocally with 1 UE assist to promote independence and safety with ADLs  - Patient will report 50% reduction in near falls when ambulating on uneven terrain      Cut off score    All date taken from [facility] Neuro Section or Rehab Measures      Lerma/56  MDC: 6 pts  Age Norms:  60-69: M - 55   F - 55  70-79: M - 54   F - 53  80-89: M - 53   F - 50 5xSTS: Catia et al 2010  MDC: 2.3 sec  Age Norms:  60-69: 11.1 sec  70-79: 12.6 sec  80-89: 14.8 sec   TUG  MDC: 4.14 sec  Cut off score:  >13.5 sec community dwelling adults  >32.2 frail elderly  <20 I for basic transfers  >30 dependent on transfers 10 Meter Walk Test: Donell and Rene et al 2011  MDC: 0.18 m/s  20-29: M - 1.35 m   F - 1.34 m  30-39: M - 1.43 m   F - 1.34 m  40-49: M - 1.43 m   F - 1.39 m  50-59: M - 1.43 m   F - 1.31 m  60-69: M - 1.34 m   F - 1.24 m  70-79: M - 1.26 m   F - 1.13 m  80-89: M - 0.97 m   F - 0.94 m    Household Ambulator < 0.4 m/s  Limited Community Ambulator 0.4 - 0.8 m/s  Community Ambulator 0.8 - 1.2 m/s  Safely cross the street > 1.2 m/s    FGA  MCID: 4 pts  Geriatrics/community < 22/30 fall risk  Geriatrics/community < 20/30 unexplained falls    DGI  MDC: vestibular - 4 pts  MDC: geriatric/community - 3 pts  Falls risk <19/24 mCTSIB  Norm: 20-60 yrs  Eyes open firm: norm sway 0.21-0.48  Eyes closed firm: norm sway 0.48-0.99  Eyes open foam: norm sway 0.38-0.71  Eyes closed foam: norm sway 0.70-2.22   6 Minute Walk Test  MDC: 190.98 ft  MCID: 164 ft    Age Norms  60-69: M - 1876 ft (571.80 m)  F - 1765 ft (537.98 m)  70-79: M - 1729 ft (527.00 m)  F - 1545 ft (470.92 m)  80-89: M - 1368 ft (416.97 m)  F - 1286 ft (391.97 m) ABC: Anne & Deon, 2003  <67% increased risk for falls   Karnack-Katia Monofilaments  Evaluator Size:        Force (grams):          Hand/Dorsal Thresholds:        Plantar Thresholds:  - 1.65                       - 0.008                       - Normal                                 - Normal  - 2.36                       - 0.02                         - Normal                                 - Normal  - 2.44                       - 0.04                         - Normal                                 - Normal  - 2.83                       - 0.07                         - Normal                                 - Normal  - 3.22                       - 0.16                         - Diminished light touch          - Normal  - 3.61                       - 0.40                         - Diminished light touch          - Normal  - 3.84                       - 0.60                         - Diminished protective           - Diminished light touch  - 4.08                       - 1.00                         - Diminished protective           - Diminished light touch  - 4.17                       - 1.40                         - Diminished protective           - Diminished light touch  - 4.31                       - 2.00                         - Diminished protective           - Diminished light touch  - 4.56                       " - 4.00                         - Loss of protective sense      - Diminished protective  - 4.74                       - 6.00                         - Loss of protective sense      - Diminished protective  - 4.93                       - 8.00                         - Loss of protective sense      - Diminished protective  - 5.07                       - 10.0                         - Loss of protective sense     - Loss of protective sense  - 5.18                       - 15.0                         - Loss of protective sense     - Loss of protective sense  - 5.46                       - 26.0                         - Loss of protective sense     - Loss of protective sense  - 5.88                       - 60.0                         - Loss of protective sense     - Loss of protective sense  - 6.10                       - 100                          - Loss of protective sense     - Loss of protective sense  - 6.45                       - 180                          - Loss of protective sense     - Loss of protective sense  - 6.65                       - 300                          - Deep pressure sense only  - Deep pressure sense only         Subjective    History of Present Illness  - Mechanism of injury: Patient had COVID-19 in early  and 2023 which resulted in reduced activity tolerance (fatigued when walking to mailbox). She has tardive dyskinesia which worsens with fatigue. She has B/L knee problems (injury L and arthritis R).   - Primary AD: none  - Assist level at home: IND  - Decreased fine motor tasks: No    Patient goal: \"Improve strength and balance.\"    Pain  - Current pain ratin/10  - At best pain ratin/10  - At worst pain ratin/10  - Location: L knee  - Aggravating factors: stairs    Social Support  Steps to enter house: 4 steps   Stairs in house: 12 steps with HR   Lives in: 2 story   Lives with: alone     Employment status: retired   Hand dominance: left     Treatments  - " Previous treatment: neuro PT for BPPV and ortho PT  - Current treatment: n/a  - Diagnostic Testing: n/a       Objective     LE MMT  - R Hip Flexion: 4-/5   L Hip Flexion: 3+/5  - R Hip Abduction: 4-/5  L Hip Abduction: 4-/5  - R Hip Adduction: 4-/5  L Hip Adduction: 4-/5  - R Knee Extension: 4/5  L Knee Extension: 4-/5  - R Knee Flexion: 4/5   L Knee Flexion: 4-/5  - R Ankle DF: 3+/5   L Ankle DF: 3+/5  - R Ankle PF: 3+/5   L Ankle PF: 3+/5    Sensation  - Light touch: intact  - Deep pressure: intact    Coordination  - Heel to Kong: slowed; likely 2/2 weakness  - Alternate Toe Taps: WFL    OT Screen  - Difficulty w/ clothing fasteners: No  - Difficulty w/ bathing: No  - Difficulty w/ dressing: No  - Difficulty w/ toileting: No    Gait  - Abnormalities: reduced arm swing, reduced floor clearance           Outcome Measures Initial Eval  3/21        5xSTS 18.29 sec        TUG  - Regular  - Cognitive   12.82 sec  16.99 sec        10 meter 0.97 m/s        LERMA 40/56        FGA NV        mCTSIB  - FTEO (firm)  - FTEC (firm)  - FTEO (foam)  - FTEC (foam)   30 sec, mild sway  28.2 sec  4.2 sec  defer        6MWT 846 ft                             HEP: 2 x 8 STS and walking 2 x 5 minutes          Precautions:   Past Medical History:   Diagnosis Date    Arthritis     Bilateral knee    Asthma All my life; pretty mild these days    Bipolar 2 disorder (HCC) 1990    Chronic kidney disease     Depression     Diabetes mellitus (HCC)     Disease of thyroid gland 2009    GERD (gastroesophageal reflux disease)

## 2024-03-26 ENCOUNTER — HOSPITAL ENCOUNTER (OUTPATIENT)
Dept: CT IMAGING | Facility: CLINIC | Age: 64
Discharge: HOME | End: 2024-03-26
Payer: COMMERCIAL

## 2024-03-26 ENCOUNTER — TELEPHONE (OUTPATIENT)
Age: 64
End: 2024-03-26

## 2024-03-26 ENCOUNTER — OFFICE VISIT (OUTPATIENT)
Age: 64
End: 2024-03-26
Payer: COMMERCIAL

## 2024-03-26 VITALS
WEIGHT: 227.6 LBS | BODY MASS INDEX: 37.53 KG/M2 | OXYGEN SATURATION: 98 % | TEMPERATURE: 97.7 F | HEART RATE: 70 BPM | DIASTOLIC BLOOD PRESSURE: 68 MMHG | RESPIRATION RATE: 18 BRPM | SYSTOLIC BLOOD PRESSURE: 112 MMHG

## 2024-03-26 DIAGNOSIS — R19.7 DIARRHEA, UNSPECIFIED TYPE: Primary | ICD-10-CM

## 2024-03-26 DIAGNOSIS — R19.7 DIARRHEA, UNSPECIFIED TYPE: ICD-10-CM

## 2024-03-26 DIAGNOSIS — R10.819 LOWER ABDOMINAL TENDERNESS: ICD-10-CM

## 2024-03-26 DIAGNOSIS — K52.9 CHRONIC DIARRHEA OF UNKNOWN ORIGIN: Primary | ICD-10-CM

## 2024-03-26 PROCEDURE — 99214 OFFICE O/P EST MOD 30 MIN: CPT | Performed by: FAMILY MEDICINE

## 2024-03-26 PROCEDURE — 74176 CT ABD & PELVIS W/O CONTRAST: CPT

## 2024-03-26 NOTE — TELEPHONE ENCOUNTER
----- Message from Susie Lamb DO sent at 3/26/2024  4:48 PM EDT -----  Please notify patient that the cat scan doesn't show any acute diverticulitis of the abdomen. It's is over unremarkable except for a kidney stone on the left. She is to use immodium to help with diarrhea movements

## 2024-03-26 NOTE — TELEPHONE ENCOUNTER
No acute intra-abdominal findings noted that would account for diarrhea or pain    Has evidence of fatty liver, diverticulosis, and a 3 mm non-obstructive kidney stone

## 2024-03-26 NOTE — PROGRESS NOTES
Name: Katlyn Frye      : 1960      MRN: [id number]  Encounter Provider: Susie Lamb DO  Encounter Date: 3/26/2024   Encounter department: [facility] PRIMARY CARE [place]    Assessment & Plan     1. Diarrhea, unspecified type  2. Lower abdominal tenderness-persistent diarrheal bowel movements with now associated lower abdominal tenderness.  Concern for diverticulitis versus colitis versus small bowel obstruction.  Will order CT abdomen for further evaluation           Subjective      Pt reports diahrrea for almost 3 weeks. At first was loose but over the past week she has had profusly wateray BM. Deneis blood or bile.   Has abdominal cramping before defecating. Denies fever.   Treid petp bisal. Helped with nausea but no diahrrea.         Review of Systems   Constitutional:  Positive for fatigue. Negative for fever.   Gastrointestinal:  Positive for nausea. Negative for diarrhea and vomiting.       Current Outpatient Medications on File Prior to Visit   Medication Sig    albuterol (Ventolin HFA) 90 mcg/act inhaler Inhale 2 puffs every 6 (six) hours as needed for wheezing    clonazePAM (KlonoPIN) 1 mg tablet Take 1 mg by mouth 2 (two) times a day    ELDERBERRY PO Take by mouth    ergocalciferol (VITAMIN D2) 50,000 units     gabapentin (NEURONTIN) 300 mg capsule Take 300 mg by mouth 3 (three) times a day    Ingrezza 80 MG CAPS 80 mg    liothyronine (CYTOMEL) 25 mcg tablet Take 25 mcg by mouth daily    magnesium oxide (MAG-OX) 400 mg tablet Take 1 tablet by mouth daily    methocarbamol (ROBAXIN) 750 mg tablet 750 mg    pyridoxine (VITAMIN B6) 100 mg tablet Take 100 mg by mouth daily    SUMAtriptan (IMITREX) 50 mg tablet Take 50 mg by mouth daily as needed    traZODone (DESYREL) 100 mg tablet Take 200 mg by mouth daily at bedtime    Vraylar 1.5 MG capsule Take 1.5 mg by mouth daily    beclomethasone (QVAR REDIHALER) 40 MCG/ACT inhaler Inhale 80 mcg 2 (two) times a day    levothyroxine 100 mcg  tablet Take 1 tablet (100 mcg total) by mouth daily       Objective     /68 (BP Location: Left arm, Patient Position: Sitting, Cuff Size: Large)   Pulse 70   Temp 97.7 °F (36.5 °C) (Temporal)   Resp 18   Wt 103 kg (227 lb 9.6 oz)   SpO2 98%   BMI 37.53 kg/m²     Physical Exam  HENT:      Head: Normocephalic.   Cardiovascular:      Rate and Rhythm: Normal rate and regular rhythm.   Pulmonary:      Effort: Pulmonary effort is normal.      Breath sounds: Normal breath sounds.   Abdominal:      General: Abdomen is protuberant. Bowel sounds are increased.      Palpations: Abdomen is soft.      Tenderness: There is abdominal tenderness in the left lower quadrant. There is no rebound.   Neurological:      Mental Status: She is alert and oriented to person, place, and time.   Psychiatric:         Mood and Affect: Mood normal.       Susie Lamb DO

## 2024-03-27 ENCOUNTER — APPOINTMENT (OUTPATIENT)
Age: 64
End: 2024-03-27
Payer: COMMERCIAL

## 2024-04-01 ENCOUNTER — OFFICE VISIT (OUTPATIENT)
Age: 64
End: 2024-04-01
Payer: COMMERCIAL

## 2024-04-01 DIAGNOSIS — U09.9 COVID-19 LONG HAULER MANIFESTING CHRONIC MUSCLE PAIN: Primary | ICD-10-CM

## 2024-04-01 DIAGNOSIS — G89.29 COVID-19 LONG HAULER MANIFESTING CHRONIC MUSCLE PAIN: Primary | ICD-10-CM

## 2024-04-01 DIAGNOSIS — M79.10 COVID-19 LONG HAULER MANIFESTING CHRONIC MUSCLE PAIN: Primary | ICD-10-CM

## 2024-04-01 DIAGNOSIS — H81.11 BPPV (BENIGN PAROXYSMAL POSITIONAL VERTIGO), RIGHT: ICD-10-CM

## 2024-04-01 DIAGNOSIS — G24.01 TARDIVE DYSKINESIA: ICD-10-CM

## 2024-04-01 PROCEDURE — 97112 NEUROMUSCULAR REEDUCATION: CPT

## 2024-04-01 NOTE — PROGRESS NOTES
"Daily Note     Today's date: 2024  Patient name: Katlyn Frye  : 1960  MRN: [id number]  Referring provider: Susie Lamb*  Dx:   Encounter Diagnosis     ICD-10-CM    1. COVID-19 long hauler manifesting chronic muscle pain  M79.10     U09.9     G89.29       2. Tardive dyskinesia  G24.01       3. BPPV (benign paroxysmal positional vertigo), right  H81.11                      Subjective: Pt reports she is tired       Objective: See treatment diary below    SpO2 rest: 98%  SpO2 low with exercise: 96%    High knee march 3 laps at // bars  Step ups 4\" at stair  2x5   STS 2x5   Hip abduction 3s iso hold  Hip ext 3s iso hold  Step taps 2x10 ea on stool  5 min walk x2    Access Code: [email or web address]     Assessment: Pt tolerated treatment well. Noted inc difficulty with balance and SLS. Overall SpO2 monitored throughout session and WFL (>96%). HEP progressed to include 5 min walk, STS and hip abduction and physical handout provided. Pt educated to complete exercises daily.Patient would benefit from continued PT pending compliance of HEP and consistency of PT.       Plan: Continue per plan of care.      POC expires Unit limit Auth Expiration date PT/OT + Visit Limit?   24 BOMN pend N/a                           Visit/Unit Tracking  AUTH Status:  Date 3/21 IE              pend Used                Remaining                       "

## 2024-04-04 ENCOUNTER — OFFICE VISIT (OUTPATIENT)
Age: 64
End: 2024-04-04
Payer: COMMERCIAL

## 2024-04-04 ENCOUNTER — CONSULT (OUTPATIENT)
Age: 64
End: 2024-04-04
Payer: COMMERCIAL

## 2024-04-04 ENCOUNTER — APPOINTMENT (OUTPATIENT)
Age: 64
End: 2024-04-04
Payer: COMMERCIAL

## 2024-04-04 VITALS
SYSTOLIC BLOOD PRESSURE: 104 MMHG | WEIGHT: 227 LBS | HEART RATE: 75 BPM | HEIGHT: 66 IN | BODY MASS INDEX: 36.48 KG/M2 | DIASTOLIC BLOOD PRESSURE: 80 MMHG | OXYGEN SATURATION: 96 %

## 2024-04-04 DIAGNOSIS — K52.9 CHRONIC DIARRHEA OF UNKNOWN ORIGIN: ICD-10-CM

## 2024-04-04 DIAGNOSIS — M79.10 COVID-19 LONG HAULER MANIFESTING CHRONIC MUSCLE PAIN: Primary | ICD-10-CM

## 2024-04-04 DIAGNOSIS — G24.01 TARDIVE DYSKINESIA: ICD-10-CM

## 2024-04-04 DIAGNOSIS — G89.29 COVID-19 LONG HAULER MANIFESTING CHRONIC MUSCLE PAIN: Primary | ICD-10-CM

## 2024-04-04 DIAGNOSIS — U09.9 COVID-19 LONG HAULER MANIFESTING CHRONIC MUSCLE PAIN: Primary | ICD-10-CM

## 2024-04-04 PROCEDURE — 99214 OFFICE O/P EST MOD 30 MIN: CPT | Performed by: INTERNAL MEDICINE

## 2024-04-04 PROCEDURE — 97112 NEUROMUSCULAR REEDUCATION: CPT

## 2024-04-04 PROCEDURE — 82784 ASSAY IGA/IGD/IGG/IGM EACH: CPT

## 2024-04-04 PROCEDURE — 86258 DGP ANTIBODY EACH IG CLASS: CPT

## 2024-04-04 PROCEDURE — 86364 TISS TRNSGLTMNASE EA IG CLAS: CPT

## 2024-04-04 PROCEDURE — 36415 COLL VENOUS BLD VENIPUNCTURE: CPT

## 2024-04-04 PROCEDURE — 86231 EMA EACH IG CLASS: CPT

## 2024-04-04 RX ORDER — SEMAGLUTIDE 0.68 MG/ML
INJECTION, SOLUTION SUBCUTANEOUS
COMMUNITY
Start: 2024-03-11

## 2024-04-04 RX ORDER — LITHIUM CARBONATE 300 MG/1
TABLET, FILM COATED, EXTENDED RELEASE ORAL
COMMUNITY
Start: 2024-03-21

## 2024-04-04 NOTE — PROGRESS NOTES
"Daily Note     Today's date: 2024  Patient name: Katlyn Frye  : 1960  MRN: [id number]  Referring provider: Susie Lamb*  Dx:   Encounter Diagnosis     ICD-10-CM    1. COVID-19 long hauler manifesting chronic muscle pain  M79.10     U09.9     G89.29       2. Tardive dyskinesia  G24.01                      Subjective: Pt reports she is tired       Objective: See treatment diary below    High knee march 3 laps at // bars  Step ups 4\" at stair  2x5   STS 2x5   Hip abduction 3s iso hold  Hip ext 3s iso hold  Step taps 2x10 ea on stool  3 min walk   Tandem 3 laps  SLS 30s x4    Access Code: [email or web address]     Assessment: Pt tolerated treatment well. Dec walk to 3 min today to accommodate patient  hip soreness. Able to complete exercises with minimal UE support. Patient would benefit from continued PT pending compliance of HEP and consistency of PT.       Plan: Continue per plan of care.      POC expires Unit limit Auth Expiration date PT/OT + Visit Limit?   24 BOMN pend N/a                           Visit/Unit Tracking  AUTH Status:  Date 3/21 IE             pend Used                Remaining                       "

## 2024-04-08 ENCOUNTER — NURSE TRIAGE (OUTPATIENT)
Age: 64
End: 2024-04-08

## 2024-04-08 LAB
ENDOMYSIUM IGA SER QL: NEGATIVE
GLIADIN PEPTIDE IGA SER-ACNC: 6 UNITS (ref 0–19)
GLIADIN PEPTIDE IGG SER-ACNC: 4 UNITS (ref 0–19)
IGA SERPL-MCNC: 439 MG/DL (ref 87–352)
TTG IGA SER-ACNC: <2 U/ML (ref 0–3)
TTG IGG SER-ACNC: 2 U/ML (ref 0–5)

## 2024-04-08 NOTE — TELEPHONE ENCOUNTER
"Pt calling in, reports she had questions regarding recommendations from Dr. Elizalde. She has not had diarrhea since stopping the magnesium oxide and was asking if she is supposed to still use imodium 4x a day and fiber?     I reviewed with pt she should only use imodium as needed for diarrhea and fiber 5 g OTC daily. She understood. No further questions.   Reason for Disposition   Information only question and nurse able to answer    Answer Assessment - Initial Assessment Questions  1. REASON FOR CALL or QUESTION: \"What is your reason for calling today?\" or \"How can I best help you?\" or \"What question do you have that I can help answer?\"      See notes    Protocols used: Information Only Call - No Triage-ADULT-[place]    "

## 2024-04-09 ENCOUNTER — OFFICE VISIT (OUTPATIENT)
Age: 64
End: 2024-04-09
Payer: COMMERCIAL

## 2024-04-09 DIAGNOSIS — G24.01 TARDIVE DYSKINESIA: ICD-10-CM

## 2024-04-09 DIAGNOSIS — U09.9 COVID-19 LONG HAULER MANIFESTING CHRONIC MUSCLE PAIN: Primary | ICD-10-CM

## 2024-04-09 DIAGNOSIS — M79.10 COVID-19 LONG HAULER MANIFESTING CHRONIC MUSCLE PAIN: Primary | ICD-10-CM

## 2024-04-09 DIAGNOSIS — G89.29 COVID-19 LONG HAULER MANIFESTING CHRONIC MUSCLE PAIN: Primary | ICD-10-CM

## 2024-04-09 PROCEDURE — 97112 NEUROMUSCULAR REEDUCATION: CPT

## 2024-04-09 NOTE — PROGRESS NOTES
"Daily Note     Today's date: 2024  Patient name: Katlyn Frye  : 1960  MRN: [id number]  Referring provider: Susie Lamb*  Dx:   Encounter Diagnosis     ICD-10-CM    1. 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 Tardive dyskinesia  G24.01                        Subjective: Pt reports she is tired from overdoing it yesterday- walked 8 min loop which wiped her out.      Objective: See treatment diary below    High knee march 3 laps at // bars  Step ups 4\" at stair  2x5   STS 2x5   Hip abduction 3s iso hold  Hip ext 3s iso hold  Step taps 2x10 ea on stool  3 min walk   Tandem 3 laps  SLS 30s x4    Access Code: [email or web address]     Assessment: Pt tolerated treatment well. Modified tx with rest breaks today, which patient tolerated well. SpO2 WFL. Continue to progress NV with AW. HEP adjusted and physical handouts provided. Patient would benefit from continued PT pending compliance of HEP and consistency of PT.       Plan: Continue per plan of care.      POC expires Unit limit Auth Expiration date PT/OT + Visit Limit?   24 BOMN pend N/a                           Visit/Unit Tracking  AUTH Status:  Date 3/21 IE            pend Used                Remaining                       "

## 2024-04-11 ENCOUNTER — OFFICE VISIT (OUTPATIENT)
Age: 64
End: 2024-04-11
Payer: COMMERCIAL

## 2024-04-11 DIAGNOSIS — G24.01 TARDIVE DYSKINESIA: ICD-10-CM

## 2024-04-11 DIAGNOSIS — M79.10 COVID-19 LONG HAULER MANIFESTING CHRONIC MUSCLE PAIN: Primary | ICD-10-CM

## 2024-04-11 DIAGNOSIS — U09.9 COVID-19 LONG HAULER MANIFESTING CHRONIC MUSCLE PAIN: Primary | ICD-10-CM

## 2024-04-11 DIAGNOSIS — G89.29 COVID-19 LONG HAULER MANIFESTING CHRONIC MUSCLE PAIN: Primary | ICD-10-CM

## 2024-04-11 PROCEDURE — 97530 THERAPEUTIC ACTIVITIES: CPT

## 2024-04-11 NOTE — PROGRESS NOTES
Daily Note     Today's date: 2024  Patient name: Katlyn Frye  : 1960  MRN: [id number]  Referring provider: Susie Lamb*  Dx:   Encounter Diagnosis     ICD-10-CM    1. COVID-19 long hauler manifesting chronic muscle pain  M79.10     U09.9     G89.29       2. Tardive dyskinesia  G24.01                        Subjective: Pt reports she is tired today.      Objective: See treatment diary below  Ambulation 100ft x 3  STS 2x10    Access Code: [email or web address]     Assessment: Pt tolerated treatment well. Patient arrived feeling fatigued today. Reports BG before arriving was 130. After completing minimal exercise, pt CGM went off and reported trending downward BG. Pt did not have tablets. Given a snack and completed seated rest break. Continued to monitor BG via CGM. All other vitals WFL. Deferred further tx today. Significant amount of time spent completing patient education on BG norms, WFL for exercise and red flags. Upon leaving pt  and trending upward.  Patient would benefit from continued PT pending compliance of HEP and consistency of PT.       Plan: Continue per plan of care.      POC expires Unit limit Auth Expiration date PT/OT + Visit Limit?   24 BOMN pend N/a                           Visit/Unit Tracking  AUTH Status:  Date 3/21 IE           pend Used                Remaining

## 2024-04-16 ENCOUNTER — OFFICE VISIT (OUTPATIENT)
Age: 64
End: 2024-04-16
Payer: COMMERCIAL

## 2024-04-16 VITALS
RESPIRATION RATE: 18 BRPM | HEIGHT: 66 IN | SYSTOLIC BLOOD PRESSURE: 104 MMHG | OXYGEN SATURATION: 97 % | HEART RATE: 65 BPM | WEIGHT: 233.4 LBS | DIASTOLIC BLOOD PRESSURE: 72 MMHG | TEMPERATURE: 98.3 F | BODY MASS INDEX: 37.51 KG/M2

## 2024-04-16 DIAGNOSIS — F31.4 BIPOLAR 1 DISORDER, DEPRESSED, SEVERE (HCC): ICD-10-CM

## 2024-04-16 DIAGNOSIS — R80.9 TYPE 2 DIABETES MELLITUS WITH MICROALBUMINURIA, WITHOUT LONG-TERM CURRENT USE OF INSULIN (HCC): Primary | ICD-10-CM

## 2024-04-16 DIAGNOSIS — E11.29 TYPE 2 DIABETES MELLITUS WITH MICROALBUMINURIA, WITHOUT LONG-TERM CURRENT USE OF INSULIN (HCC): Primary | ICD-10-CM

## 2024-04-16 DIAGNOSIS — R19.7 DIARRHEA, UNSPECIFIED TYPE: ICD-10-CM

## 2024-04-16 DIAGNOSIS — E03.9 ACQUIRED HYPOTHYROIDISM: ICD-10-CM

## 2024-04-16 PROCEDURE — 99214 OFFICE O/P EST MOD 30 MIN: CPT | Performed by: FAMILY MEDICINE

## 2024-04-16 RX ORDER — AMANTADINE HYDROCHLORIDE 100 MG/1
100 TABLET ORAL 3 TIMES DAILY
COMMUNITY
Start: 2024-04-11

## 2024-04-16 NOTE — PROGRESS NOTES
Name: Katlyn Frye      : 1960      MRN: [id number]  Encounter Provider: Susie Lamb DO  Encounter Date: 2024   Encounter department: [facility] [place]    Assessment & Plan     1. Type 2 diabetes mellitus with microalbuminuria, without long-term current use of insulin (HCC)- diet controlled. Recnt A1c of 6.1 will continue to monitor     2. Acquired hypothyroidism- TSH wnl.     3. Bipolar 1 disorder, depressed, severe (HCC)- symptoms stable on  vraylar 1.5mg qd, lithium 3000mg qd, and trazadone 200mg qhs.     4. Diarrhea, unspecified type-resolved.            Subjective      Pt present for f/u  Discussed diarrhea BM. Has resolved.   Discussed PT.       Review of Systems   Constitutional:  Positive for fatigue.   Respiratory:  Negative for shortness of breath.    Cardiovascular:  Negative for chest pain.   Gastrointestinal:  Negative for diarrhea.   Psychiatric/Behavioral:  Positive for sleep disturbance.        Current Outpatient Medications on File Prior to Visit   Medication Sig    albuterol (Ventolin HFA) 90 mcg/act inhaler Inhale 2 puffs every 6 (six) hours as needed for wheezing    Amantadine HCl 100 MG tablet Take 100 mg by mouth 3 (three) times a day    clonazePAM (KlonoPIN) 1 mg tablet Take 1 mg by mouth 2 (two) times a day    ELDERBERRY PO Take by mouth    ergocalciferol (VITAMIN D2) 50,000 units     gabapentin (NEURONTIN) 300 mg capsule Take 300 mg by mouth 3 (three) times a day    Ingrezza 80 MG CAPS 80 mg    levothyroxine 100 mcg tablet Take 1 tablet (100 mcg total) by mouth daily    liothyronine (CYTOMEL) 25 mcg tablet Take 25 mcg by mouth daily    lithium carbonate (LITHOBID) 300 mg CR tablet TAKE 3 TABLETS BY ORAL ROUTE AT BEDTIME    methocarbamol (ROBAXIN) 750 mg tablet 750 mg    Ozempic, 0.25 or 0.5 MG/DOSE, 2 MG/3ML injection pen     pyridoxine (VITAMIN B6) 100 mg tablet Take 100 mg by mouth daily    SUMAtriptan (IMITREX) 50 mg tablet Take 50 mg by  "mouth daily as needed    traZODone (DESYREL) 100 mg tablet Take 200 mg by mouth daily at bedtime    Vraylar 1.5 MG capsule Take 1.5 mg by mouth daily    beclomethasone (QVAR REDIHALER) 40 MCG/ACT inhaler Inhale 80 mcg 2 (two) times a day    [DISCONTINUED] magnesium oxide (MAG-OX) 400 mg tablet Take 1 tablet by mouth daily       Objective     /72 (BP Location: Left arm, Patient Position: Sitting, Cuff Size: Large)   Pulse 65   Temp 98.3 °F (36.8 °C) (Tympanic)   Resp 18   Ht 5' 6\" (1.676 m)   Wt 106 kg (233 lb 6.4 oz)   SpO2 97%   BMI 37.67 kg/m²     Physical Exam  Constitutional:       Appearance: She is normal weight.   HENT:      Head: Normocephalic and atraumatic.   Cardiovascular:      Rate and Rhythm: Normal rate and regular rhythm.   Pulmonary:      Effort: Pulmonary effort is normal.      Breath sounds: Normal breath sounds.   Abdominal:      General: Bowel sounds are normal.      Palpations: Abdomen is soft.   Musculoskeletal:      Right lower leg: No edema.      Left lower leg: No edema.   Neurological:      Mental Status: She is oriented to person, place, and time.   Psychiatric:         Mood and Affect: Mood normal.       Susie Lamb,     "

## 2024-04-17 ENCOUNTER — OFFICE VISIT (OUTPATIENT)
Age: 64
End: 2024-04-17
Payer: COMMERCIAL

## 2024-04-17 DIAGNOSIS — G24.01 TARDIVE DYSKINESIA: ICD-10-CM

## 2024-04-17 DIAGNOSIS — U09.9 COVID-19 LONG HAULER MANIFESTING CHRONIC MUSCLE PAIN: Primary | ICD-10-CM

## 2024-04-17 DIAGNOSIS — M79.10 COVID-19 LONG HAULER MANIFESTING CHRONIC MUSCLE PAIN: Primary | ICD-10-CM

## 2024-04-17 DIAGNOSIS — H81.11 BPPV (BENIGN PAROXYSMAL POSITIONAL VERTIGO), RIGHT: ICD-10-CM

## 2024-04-17 DIAGNOSIS — G89.29 COVID-19 LONG HAULER MANIFESTING CHRONIC MUSCLE PAIN: Primary | ICD-10-CM

## 2024-04-17 PROCEDURE — 97112 NEUROMUSCULAR REEDUCATION: CPT

## 2024-04-17 NOTE — PROGRESS NOTES
Daily Note     Today's date: 2024  Patient name: Katlyn Frye  : 1960  MRN: [id number]  Referring provider: Susie Lamb*  Dx:   Encounter Diagnosis     ICD-10-CM    1. COVID-19 long hauler manifesting chronic muscle pain  M79.10     U09.9     G89.29       2. Tardive dyskinesia  G24.01       3. BPPV (benign paroxysmal positional vertigo), right  H81.11               Start Time: 1145  Stop Time: 1225  Total time in clinic (min): 40 minutes    Subjective: Pt reports she is tired today.      Objective: See treatment diary below    High knee marches 3 laps  Tandem 3 laps  Ambulating headt turns H/V 3 laps ea  Hip abduction 3s iso hold 2x10   Hip extension 3s iso hold 2x10  Bridges 10x  Ambulation 3' ambulation x 3      Access Code: [email or web address]     Assessment: Pt tolerated treatment well.  Blood sugar 180 upon arrival, asymptomatic. Pt does report moderate pain behind L knee. Modified tx to minimize knee pain. Pt demo good technique with exercises.  Patient would benefit from continued PT pending compliance of HEP and consistency of PT. NV re-evaluation, assess       Plan: Continue per plan of care.      POC expires Unit limit Auth Expiration date PT/OT + Visit Limit?   24 BOMN pend N/a                           Visit/Unit Tracking  AUTH Status:  Date 3/21 IE          pend Used                Remaining

## 2024-04-18 ENCOUNTER — OFFICE VISIT (OUTPATIENT)
Dept: OBGYN CLINIC | Facility: CLINIC | Age: 64
End: 2024-04-18
Payer: COMMERCIAL

## 2024-04-18 VITALS
BODY MASS INDEX: 37.77 KG/M2 | DIASTOLIC BLOOD PRESSURE: 69 MMHG | WEIGHT: 235 LBS | SYSTOLIC BLOOD PRESSURE: 105 MMHG | HEART RATE: 70 BPM | HEIGHT: 66 IN | OXYGEN SATURATION: 98 % | TEMPERATURE: 97.8 F | RESPIRATION RATE: 15 BRPM

## 2024-04-18 DIAGNOSIS — M17.0 PRIMARY OSTEOARTHRITIS OF BOTH KNEES: Primary | ICD-10-CM

## 2024-04-18 PROCEDURE — 99213 OFFICE O/P EST LOW 20 MIN: CPT | Performed by: FAMILY MEDICINE

## 2024-04-18 PROCEDURE — 20610 DRAIN/INJ JOINT/BURSA W/O US: CPT | Performed by: FAMILY MEDICINE

## 2024-04-18 RX ORDER — LIDOCAINE HYDROCHLORIDE 10 MG/ML
4 INJECTION, SOLUTION INFILTRATION; PERINEURAL
Status: COMPLETED | OUTPATIENT
Start: 2024-04-18 | End: 2024-04-18

## 2024-04-18 RX ORDER — TRIAMCINOLONE ACETONIDE 40 MG/ML
40 INJECTION, SUSPENSION INTRA-ARTICULAR; INTRAMUSCULAR
Status: COMPLETED | OUTPATIENT
Start: 2024-04-18 | End: 2024-04-18

## 2024-04-18 RX ADMIN — LIDOCAINE HYDROCHLORIDE 4 ML: 10 INJECTION, SOLUTION INFILTRATION; PERINEURAL at 09:30

## 2024-04-18 RX ADMIN — TRIAMCINOLONE ACETONIDE 40 MG: 40 INJECTION, SUSPENSION INTRA-ARTICULAR; INTRAMUSCULAR at 09:30

## 2024-04-18 NOTE — PROGRESS NOTES
Large joint arthrocentesis: L knee  Universal Protocol:  Consent: Verbal consent obtained.  Risks and benefits: risks, benefits and alternatives were discussed  Consent given by: patient  Patient identity confirmed: verbally with patient  Supporting Documentation  Indications: pain   Procedure Details  Location: knee - L knee  Preparation: Patient was prepped and draped in the usual sterile fashion  Needle size: 22 G  Ultrasound guidance: no  Approach: anterolateral  Medications administered: 4 mL lidocaine 1 %; 40 mg triamcinolone acetonide 40 mg/mL    Patient tolerance: patient tolerated the procedure well with no immediate complications    Risks and benefits of CSI were discussed with patient extensively. Risks were highlighted which included but were not limited to infection, pain, local site swelling, and chance that injection may not be effective. Patient was also counseled regarding glucose elevation days after receiving CSI and to be mindful of diet and check sugars daily. Patient agreeable to proceed with CSI after counseling.

## 2024-04-18 NOTE — PROGRESS NOTES
Subjective:    Chief Complaint   Patient presents with    Left Knee - Follow-up, Pain, Swelling    Right Knee - Follow-up, Pain       Katlyn Frye is a 63 y.o. female presenting for a 3-month follow-up visit for bilateral knee pain.  She states that the left knee is bothering her more than the right.  Affecting ability to walk up and down steps.  She has received the Visco injection therapy approximately 3 months ago which she states did provide her significant relief.  Right knee is doing relatively well  In regards to pain.  She would like a corticosteroid injection for the left knee    The following portions were reviewed and updated as needed: allergies, current medications, past medical history, past social history, past surgical history and problem list.    Review of Systems   Constitutional: Negative for fever.   HENT: Negative for dental problem and headaches.    Eyes: Negative for vision loss.   Respiratory: Negative for cough and shortness of breath.    Cardiovascular: Negative for leg swelling and palpitations.   Gastrointestinal: Negative for constipation and diarrhea.   Genitourinary: Negative for bladder incontinence and difficulty urinating.   Musculoskeletal: Negative for back pain and difficulty walking.   Skin: Negative for rash and ulcer.   Neurological: Negative for dizziness and headaches.   Hem/Lymph/Immuno: Negative for blood clots. Does not bruise/bleed easily.   Psychiatric/Behavioral: Negative for confusion.         Objective:  General: no acute distress, non toxic, AAO x3   Skin: no skin changes, no rashes, no wounds or laceration  Vasculature: normal cap refill, no LE edema, normal popliteal and dorsalis pedis pulse  Neurologic:   Musculoskeletal: Bilateral KNEE EXAM  Gait: limping gait negative, able to weight bear without difficulty  Inspection: No gross deformity, no redness or warmth   Effusion: Negative   Medial joint line TTP: Positive  Lateral joint line TTP: Positive  ROM:  Full flexion and extension  Akshat's: negative,   Instability to varus/valgus stress: negative  Anterior Drawer: negative   Lachman's test: negative  Posterior Drawer: negative            Imaging:       Assessment/Plan:  1. Primary osteoarthritis of both knees  A corticosteroid injection was provided for the patient's left knee at today's office visit.  She will return in about 3 months for Visco injection therapy.  Did advise her to return if the right knee does worsen pain symptoms before her follow-up visit.

## 2024-04-19 ENCOUNTER — EVALUATION (OUTPATIENT)
Age: 64
End: 2024-04-19
Payer: COMMERCIAL

## 2024-04-19 DIAGNOSIS — U09.9 COVID-19 LONG HAULER MANIFESTING CHRONIC MUSCLE PAIN: Primary | ICD-10-CM

## 2024-04-19 DIAGNOSIS — G24.01 TARDIVE DYSKINESIA: ICD-10-CM

## 2024-04-19 DIAGNOSIS — M79.10 COVID-19 LONG HAULER MANIFESTING CHRONIC MUSCLE PAIN: Primary | ICD-10-CM

## 2024-04-19 DIAGNOSIS — G89.29 COVID-19 LONG HAULER MANIFESTING CHRONIC MUSCLE PAIN: Primary | ICD-10-CM

## 2024-04-19 DIAGNOSIS — H81.11 BPPV (BENIGN PAROXYSMAL POSITIONAL VERTIGO), RIGHT: ICD-10-CM

## 2024-04-19 PROCEDURE — 97112 NEUROMUSCULAR REEDUCATION: CPT

## 2024-04-19 PROCEDURE — 97530 THERAPEUTIC ACTIVITIES: CPT

## 2024-04-19 NOTE — PROGRESS NOTES
PT Re-Evaluation /Progress         POC expires Unit limit Auth Expiration date PT/OT + Visit Limit?   24 BOMN pend N/a                           Visit/Unit Tracking  AUTH Status:  Date 3/21 IE              pend Used                Remaining                         Today's date: 2024  Patient name: Katlyn Frye  : 1960  MRN: [id number]  Referring provider: Susie Lamb*  Dx:   Encounter Diagnosis     ICD-10-CM    1. COVID-19 long hauler manifesting chronic muscle pain  M79.10     U09.9     G89.29       2. Tardive dyskinesia  G24.01       3. BPPV (benign paroxysmal positional vertigo), right  H81.11           Assessment  Assessment details: Patient is a 63 y.o. Female who presents to skilled outpatient PT with complaints of reduced activity tolerance, weakness, and balance impairments 2/2 long-haul COVID. Co-morbidities impacting quality of care include R knee arthritis, L knee injury, tardive dyskinesia, and asthma. POC complicated by L knee injection. Upon progress update, pt preents with improvements in TUG, MCTSIB, FGA, and LERMA. She demo increased risk for falls per FGA and is a limited ambulator below age related norms per 6mWT. Knee pain increased with activity and deferred 5xSTS today, continue NV. Despite acute knee pain, pt demo significant improvements in all other testing, including dynamic and static balance. Patient continues to have reduced righting reactions, impaired balance, dec LE strength, and impaired functional mobility/transfers. Patient is a good candidate to continue to receive skilled OPPT services to improve functional.  Due to over fatigue, will trial circuit style training NV.       Impairments: Abnormal coordination, Abnormal gait, Abnormal muscle tone, Abnormal or restricted ROM, Activity intolerance, Impaired balance, Impaired physical strength, Lacks appropriate HEP, Poor posture, Poor  body mechanics, Pain with function, Safety issue, Weight-bearing intolerance, Abnormal movement, Difficulty understanding, Abnormal muscle firing  Understanding of Dx/Px/POC: Good  Prognosis: Good    Patient verbalized understanding of POC.         Please contact me if you have any questions or recommendations. Thank you for the referral and the opportunity to share in Katlyn Frye's care.        Plan  Plan details:   Patient would benefit from: Skilled PT  Planned modality interventions: Biofeedback, Cryotherapy, TENS, Thermotherapy  Planned therapy interventions: Abdominal trunk stabilization, ADL training, Balance, Balance/WB training, Breathing training, Body mechanics training, Coordination, Functional ROM exercises, Gait training, HEP, Joint Mobilization, Manual Therapy, Perez taping, Motor coordination training, Neuromuscular re-education, Patient education, Postural training, Strengthening, Stretching, Therapeutic activities, Therapeutic exercises, Therapeutic training, Transfer training, Activity modification, Work reintegration  Frequency: 1-2x/wk  Duration in weeks: 12 weeks  Plan of Care beginning date: 3/21/2024  Plan of Care expiration date: 3 months - 7/19/2024  Treatment plan discussed with: Patient      Goals  Short Term Goals (4 weeks):    - Patient will improve time on TUG by 2.9 seconds to facilitate improved safety in all ambulation  - Patient will be independent in basic HEP 2-3 weeks  - Patient will improve 5xSTS score by 2.3 seconds to promote improved LE functional strength needed for ADLs  - Patient will complete FGA    Long Term Goals (12 weeks):  - Patient will be independent in a comprehensive home exercise program  - Patient will improve gait speed by 0.18 m/s to improve safety with community ambulation  - Patient will improve LERMA by 6 points in order to improve static balance and reduce risk for falls  - Patient will improve scoring on FGA by 4 points to progress safety with  dynamic tasks  - Patient will be able to demonstrate HT in gait without veering  - Patient will improve 6 Minute Walk Test score by 190 feet to promote improved cardiovascular endurance  - Patient will report 50% reduction in near falls in order to improve safety with functional tasks and reduce his risk for falls  - Patient will report going on walks at least 3 days per week to promote independence and improved cardiovascular endurance  - Patient will be able to ascend/descend stairs reciprocally with 1 UE assist to promote independence and safety with ADLs  - Patient will report 50% reduction in near falls when ambulating on uneven terrain      Cut off score    All date taken from [facility] Neuro Section or Rehab Measures      Nascimento/56  MDC: 6 pts  Age Norms:  60-69: M - 55   F - 55  70-79: M - 54   F - 53  80-89: M - 53   F - 50 5xSTS: Catia et al 2010  MDC: 2.3 sec  Age Norms:  60-69: 11.1 sec  70-79: 12.6 sec  80-89: 14.8 sec   TUG  MDC: 4.14 sec  Cut off score:  >13.5 sec community dwelling adults  >32.2 frail elderly  <20 I for basic transfers  >30 dependent on transfers 10 Meter Walk Test: Donell and Rene et al 2011  MDC: 0.18 m/s  20-29: M - 1.35 m   F - 1.34 m  30-39: M - 1.43 m   F - 1.34 m  40-49: M - 1.43 m   F - 1.39 m  50-59: M - 1.43 m   F - 1.31 m  60-69: M - 1.34 m   F - 1.24 m  70-79: M - 1.26 m   F - 1.13 m  80-89: M - 0.97 m   F - 0.94 m    Household Ambulator < 0.4 m/s  Limited Community Ambulator 0.4 - 0.8 m/s  Community Ambulator 0.8 - 1.2 m/s  Safely cross the street > 1.2 m/s   FGA  MCID: 4 pts  Geriatrics/community < 22/30 fall risk  Geriatrics/community < 20/30 unexplained falls    DGI  MDC: vestibular - 4 pts  MDC: geriatric/community - 3 pts  Falls risk <19/24 mCTSIB  Norm: 20-60 yrs  Eyes open firm: norm sway 0.21-0.48  Eyes closed firm: norm sway 0.48-0.99  Eyes open foam: norm sway 0.38-0.71  Eyes closed foam: norm sway 0.70-2.22   6 Minute Walk Test  MDC: 190.98 ft  MCID: 164  ft    Age Norms  60-69: M - 1876 ft (571.80 m)  F - 1765 ft (537.98 m)  70-79: M - 1729 ft (527.00 m)  F - 1545 ft (470.92 m)  80-89: M - 1368 ft (416.97 m)  F - 1286 ft (391.97 m) ABC: Anne & Deon, 2003  <67% increased risk for falls   Monroe-Katia Monofilaments  Evaluator Size:        Force (grams):          Hand/Dorsal Thresholds:        Plantar Thresholds:  - 1.65                       - 0.008                       - Normal                                 - Normal  - 2.36                       - 0.02                         - Normal                                 - Normal  - 2.44                       - 0.04                         - Normal                                 - Normal  - 2.83                       - 0.07                         - Normal                                 - Normal  - 3.22                       - 0.16                         - Diminished light touch          - Normal  - 3.61                       - 0.40                         - Diminished light touch          - Normal  - 3.84                       - 0.60                         - Diminished protective           - Diminished light touch  - 4.08                       - 1.00                         - Diminished protective           - Diminished light touch  - 4.17                       - 1.40                         - Diminished protective           - Diminished light touch  - 4.31                       - 2.00                         - Diminished protective           - Diminished light touch  - 4.56                       - 4.00                         - Loss of protective sense      - Diminished protective  - 4.74                       - 6.00                         - Loss of protective sense      - Diminished protective  - 4.93                       - 8.00                         - Loss of protective sense      - Diminished protective  - 5.07                       - 10.0                         - Loss of protective sense     -  "Loss of protective sense  - 5.18                       - 15.0                         - Loss of protective sense     - Loss of protective sense  - 5.46                       - 26.0                         - Loss of protective sense     - Loss of protective sense  - 5.88                       - 60.0                         - Loss of protective sense     - Loss of protective sense  - 6.10                       - 100                          - Loss of protective sense     - Loss of protective sense  - 6.45                       - 180                          - Loss of protective sense     - Loss of protective sense  - 6.65                       - 300                          - Deep pressure sense only  - Deep pressure sense only         Subjective    History of Present Illness  - Mechanism of injury: Patient had COVID-19 in early  and 2023 which resulted in reduced activity tolerance (fatigued when walking to mailbox). She has tardive dyskinesia which worsens with fatigue. She has B/L knee problems (injury L and arthritis R).     UPDATE 24: Pt is completing HEP \"pretty much daily\" occasionally reporting missing a day. She is walking 6-8 minutes 2xday and hip strengthening. She did have POC complication of L knee pain and had CSI on L side . She reports she feels her therapy has been a bit too aggressive and would request to \"scale it back a bit\" and work towards slow progression.       - Primary AD: none  - Assist level at home: IND  - Decreased fine motor tasks: No    Patient goal: \"Improve strength and balance.\"    Pain  - Current pain ratin/10  - At best pain ratin/10  - At worst pain ratin/10  - Location: L knee  - Aggravating factors: stairs    Social Support  Steps to enter house: 4 steps   Stairs in house: 12 steps with HR   Lives in: 2 story   Lives with: alone     Employment status: retired   Hand dominance: left     Treatments  - Previous treatment: neuro PT for BPPV and " ortho PT  - Current treatment: n/a  - Diagnostic Testing: n/a       Objective     LE MMT  - R Hip Flexion: 4-/5   L Hip Flexion: 3+/5  - R Hip Abduction: 4-/5  L Hip Abduction: 4-/5  - R Hip Adduction: 4-/5  L Hip Adduction: 4-/5  - R Knee Extension: 4/5  L Knee Extension: 4-/5  - R Knee Flexion: 4/5   L Knee Flexion: 4-/5  - R Ankle DF: 3+/5   L Ankle DF: 3+/5  - R Ankle PF: 3+/5   L Ankle PF: 3+/5    Sensation  - Light touch: intact  - Deep pressure: intact    Coordination  - Heel to [place]: slowed; likely 2/2 weakness  - Alternate Toe Taps: WFL    OT Screen  - Difficulty w/ clothing fasteners: No  - Difficulty w/ bathing: No  - Difficulty w/ dressing: No  - Difficulty w/ toileting: No    Gait  - Abnormalities: reduced arm swing, reduced floor clearance           Outcome Measures Initial Eval  3/21 4/19       5xSTS 18.29 sec Defer due to CSI       TUG  - Regular  - Cognitive   12.82 sec  16.99 sec   6.63 sec  NT       10 meter 0.97 m/s 1.1m/s        MARIA GUADALUPE 40/56 46/56       FGA NV        mCTSIB  - FTEO (firm)  - FTEC (firm)  - FTEO (foam)  - FTEC (foam)   30 sec, mild sway  28.2 sec  4.2 sec  defer   30s   30s  30s min sway  30s SEVERE sway       6MWT 846 ft 400ft 2mWT  Unable to complete full due to L knee pain ended 4:17                            HEP: 2 x 8 STS and walking 2 x 5 minutes      Precautions:   Past Medical History:   Diagnosis Date    Arthritis     Bilateral knee    Asthma All my life; pretty mild these days    Bipolar 2 disorder (HCC) 1990    Chronic kidney disease     Depression     Diabetes mellitus (HCC)     Disease of thyroid gland 2009    GERD (gastroesophageal reflux disease)

## 2024-04-22 ENCOUNTER — TELEPHONE (OUTPATIENT)
Age: 64
End: 2024-04-22

## 2024-04-22 ENCOUNTER — OFFICE VISIT (OUTPATIENT)
Age: 64
End: 2024-04-22
Payer: COMMERCIAL

## 2024-04-22 DIAGNOSIS — U09.9 COVID-19 LONG HAULER MANIFESTING CHRONIC MUSCLE PAIN: Primary | ICD-10-CM

## 2024-04-22 DIAGNOSIS — G89.29 COVID-19 LONG HAULER MANIFESTING CHRONIC MUSCLE PAIN: Primary | ICD-10-CM

## 2024-04-22 DIAGNOSIS — M79.10 COVID-19 LONG HAULER MANIFESTING CHRONIC MUSCLE PAIN: Primary | ICD-10-CM

## 2024-04-22 DIAGNOSIS — G24.01 TARDIVE DYSKINESIA: ICD-10-CM

## 2024-04-22 DIAGNOSIS — H81.11 BPPV (BENIGN PAROXYSMAL POSITIONAL VERTIGO), RIGHT: ICD-10-CM

## 2024-04-22 PROCEDURE — 97110 THERAPEUTIC EXERCISES: CPT

## 2024-04-22 PROCEDURE — 97112 NEUROMUSCULAR REEDUCATION: CPT

## 2024-04-22 NOTE — TELEPHONE ENCOUNTER
Caller: patient    Doctor: lissette    Reason for call: patient hurt left knee on Saturday and would like some clinical advice on what to do   Please advise     Call back#: [phone]

## 2024-04-22 NOTE — TELEPHONE ENCOUNTER
Call to this patient reviewed her symptoms discussed elevating, Icing, OTC pain relievers. She is not able to take NSAID'S. She was advised that if her symptoms do not subside she should be seen by care now to be evaluated, since Dr Pierce is out of the office until April 30th, and his closest colleague is also booked. She understood and was given information on the closest care now to her home. She will let us know if she is not getting any better, and will be put on the cancellation list for first available appointment.

## 2024-04-22 NOTE — PROGRESS NOTES
"Daily Note     Today's date: 2024  Patient name: Katlyn Frye  : 1960  MRN: [id number]  Referring provider: Susie Lamb*  Dx:   Encounter Diagnosis     ICD-10-CM    1. 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 BPPV (benign paroxysmal positional vertigo), right  H81.11       3. Tardive dyskinesia  G24.01                      Subjective: Patient reports that her Right knee was injured over the weekend. Unsure of what she did. Patient reports pain being 7/10.       Objective: See treatment diary below  BS: 119   NMR: 2 mins on 3 mins off   Tandem 3 laps  Ambulating head turns H/V 3 laps ea  Hip abduction 3s iso hold 2x10   Hip extension 3s iso hold 2x10  Bridges 10x  Hip add/abd 10 x 3\"  Ab brace 5\" x 10    Assessment: Tolerated treatment fair. Initiated circuit training this session. Patient tolerated circuit training well and experienced less fatigue at the end of the session. Deferred sit to stands and HKM due to patients increase in knee pain. Patient had increased pain in her knee near the middle of the session, deferred standing for the rest of the session. Continued with mat exercises. Patient demonstrated fatigue post treatment, exhibited good technique with therapeutic exercises, and would benefit from continued PT      Plan: Continue per plan of care.      POC expires Unit limit Auth Expiration date PT/OT + Visit Limit?   24 BOMN pend N/a                           Visit/Unit Tracking  AUTH Status:  Date 3/21 IE  RE        pend Used                Remaining                         "

## 2024-04-24 ENCOUNTER — OFFICE VISIT (OUTPATIENT)
Age: 64
End: 2024-04-24
Payer: COMMERCIAL

## 2024-04-24 DIAGNOSIS — G24.01 TARDIVE DYSKINESIA: ICD-10-CM

## 2024-04-24 DIAGNOSIS — H81.11 BPPV (BENIGN PAROXYSMAL POSITIONAL VERTIGO), RIGHT: ICD-10-CM

## 2024-04-24 DIAGNOSIS — U09.9 COVID-19 LONG HAULER MANIFESTING CHRONIC MUSCLE PAIN: Primary | ICD-10-CM

## 2024-04-24 DIAGNOSIS — G89.29 COVID-19 LONG HAULER MANIFESTING CHRONIC MUSCLE PAIN: Primary | ICD-10-CM

## 2024-04-24 DIAGNOSIS — M79.10 COVID-19 LONG HAULER MANIFESTING CHRONIC MUSCLE PAIN: Primary | ICD-10-CM

## 2024-04-24 PROCEDURE — 97112 NEUROMUSCULAR REEDUCATION: CPT

## 2024-04-24 NOTE — PROGRESS NOTES
Daily Note     Today's date: 2024  Patient name: Katlyn Frye  : 1960  MRN: [id number]  Referring provider: Susie Lamb*  Dx:   Encounter Diagnosis     ICD-10-CM    1. 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 BPPV (benign paroxysmal positional vertigo), right  H81.11       3. Tardive dyskinesia  G24.01           Subjective: Patient reports that her Right knee hurts       Objective: See treatment diary below  BS: 151  NMR: 2:30 mins on 3 mins off   Ambulating DT cog x 2 rounds  Ambulating head turns H/V   Hip abduction 3s iso hold, Hip extension 3s iso hold  Bridges (20x)  Hip add/abd  Tr A with SLR    Assessment: Pt tolerated treatment fair. Continued circuit training this session increased to 2:30 on, which patient was able to complete without difficulty. Most challenged with SLS and SLRs. Continue to progress as appropriate NV. Patient demonstrated fatigue post treatment, exhibited good technique with therapeutic exercises, and would benefit from continued PT      Plan: Continue per plan of care.      POC expires Unit limit Auth Expiration date PT/OT + Visit Limit?   24 BOMN pend N/a                           Visit/Unit Tracking  AUTH Status:  Date 3/21 IE  RE        pend Used                Remaining

## 2024-04-26 ENCOUNTER — TELEPHONE (OUTPATIENT)
Age: 64
End: 2024-04-26

## 2024-04-26 DIAGNOSIS — G47.00 INSOMNIA, UNSPECIFIED TYPE: Primary | ICD-10-CM

## 2024-04-26 NOTE — TELEPHONE ENCOUNTER
Pt seen hematology today and they suggest she see sleep medicine.  Will you place a referral or does she need to come in for an appointment?

## 2024-04-30 ENCOUNTER — OFFICE VISIT (OUTPATIENT)
Age: 64
End: 2024-04-30
Payer: COMMERCIAL

## 2024-04-30 DIAGNOSIS — H81.11 BPPV (BENIGN PAROXYSMAL POSITIONAL VERTIGO), RIGHT: ICD-10-CM

## 2024-04-30 DIAGNOSIS — G24.01 TARDIVE DYSKINESIA: ICD-10-CM

## 2024-04-30 DIAGNOSIS — M79.10 COVID-19 LONG HAULER MANIFESTING CHRONIC MUSCLE PAIN: Primary | ICD-10-CM

## 2024-04-30 DIAGNOSIS — U09.9 COVID-19 LONG HAULER MANIFESTING CHRONIC MUSCLE PAIN: Primary | ICD-10-CM

## 2024-04-30 DIAGNOSIS — G89.29 COVID-19 LONG HAULER MANIFESTING CHRONIC MUSCLE PAIN: Primary | ICD-10-CM

## 2024-04-30 PROCEDURE — 95992 CANALITH REPOSITIONING PROC: CPT | Performed by: PHYSICAL THERAPIST

## 2024-04-30 NOTE — PROGRESS NOTES
Daily Note     Today's date: 2024  Patient name: Katlyn Frye  : 1960  MRN: [id number]  Referring provider: Susie Lamb*  Dx:   Encounter Diagnosis     ICD-10-CM    1. COVID-19 long hauler manifesting chronic muscle pain  M79.10     U09.9     G89.29       2. BPPV (benign paroxysmal positional vertigo), right  H81.11       3. Tardive dyskinesia  G24.01           Subjective: Patient reports spinning sensation this am starting around 3 am when getting out of bed. Had 3 falls since this am. Notes spinning dizziness is same as prior when she was treated here in the past.       Objective: See treatment diary below    Positional testing:   + right jeffrey-hallpike with symptoms lasting 73 seconds    R eply was completed followed by Semont     Assessment: Pt tolerated treatment fair. Pt had decrease in dizziness with spinning sensation post maneuvers noted in objective section. Longest dizziness lasting 72 seconds. Pt had increased lateral lean left with standing post which improved with seated rest break for 15 minutes. Pt will benefit from follow up BPPV check at next session.       Plan: Continue per plan of care.      POC expires Unit limit Auth Expiration date PT/OT + Visit Limit?   24 BOMN pend N/a                           Visit/Unit Tracking  AUTH Status:  Date 3/21 IE  RE       pend Used                Remaining

## 2024-05-01 ENCOUNTER — OFFICE VISIT (OUTPATIENT)
Age: 64
End: 2024-05-01
Payer: COMMERCIAL

## 2024-05-01 DIAGNOSIS — U09.9 COVID-19 LONG HAULER MANIFESTING CHRONIC MUSCLE PAIN: Primary | ICD-10-CM

## 2024-05-01 DIAGNOSIS — G89.29 COVID-19 LONG HAULER MANIFESTING CHRONIC MUSCLE PAIN: Primary | ICD-10-CM

## 2024-05-01 DIAGNOSIS — M79.10 COVID-19 LONG HAULER MANIFESTING CHRONIC MUSCLE PAIN: Primary | ICD-10-CM

## 2024-05-01 DIAGNOSIS — G24.01 TARDIVE DYSKINESIA: ICD-10-CM

## 2024-05-01 DIAGNOSIS — H81.11 BPPV (BENIGN PAROXYSMAL POSITIONAL VERTIGO), RIGHT: ICD-10-CM

## 2024-05-01 PROCEDURE — 97110 THERAPEUTIC EXERCISES: CPT | Performed by: PHYSICAL THERAPIST

## 2024-05-01 PROCEDURE — 95992 CANALITH REPOSITIONING PROC: CPT | Performed by: PHYSICAL THERAPIST

## 2024-05-01 PROCEDURE — 97112 NEUROMUSCULAR REEDUCATION: CPT | Performed by: PHYSICAL THERAPIST

## 2024-05-01 NOTE — PROGRESS NOTES
Daily Note     Today's date: 2024  Patient name: Katlyn Frye  : 1960  MRN: [id number]  Referring provider: Susie Lamb*  Dx:   Encounter Diagnosis     ICD-10-CM    1. COVID-19 long hauler manifesting chronic muscle pain  M79.10     U09.9     G89.29       2. BPPV (benign paroxysmal positional vertigo), right  H81.11       3. Tardive dyskinesia  G24.01                      Subjective: Pt reports feeling better than yesterday, room spinning sensation has subsided but she did report vomiting after last session later in the evening.      Objective: See treatment diary below    +R jeffrey hallpike  4x epley    NMR: 2:30 mins on 3 mins off   Ambulating DT cog x 1 round  Ambulating head turns H/V, deferred horizontal    Hip abduction 3s iso hold, Hip extension 3s iso hold x10 ea  Bridges (20x)  Hip add/abd  Tr A with SLR x10 ea    Assessment: Tolerated treatment fair. Performed R jeffrey hallpike for re-test, noted upward torsional nystagmus with symptoms lasting <30 seconds, able to clear symptoms with 4x R epley maneuver.  Will re-test next visit to determine need for further CRT.  Patient demonstrated fatigue post treatment and would benefit from continued PT      Plan: Continue per plan of care.      POC expires Unit limit Auth Expiration date PT/OT + Visit Limit?   24 BOMN pend N/a                           Visit/Unit Tracking  AUTH Status:  Date 3/21 IE  RE      pend Used                Remaining

## 2024-05-02 ENCOUNTER — OFFICE VISIT (OUTPATIENT)
Age: 64
End: 2024-05-02
Payer: COMMERCIAL

## 2024-05-02 ENCOUNTER — APPOINTMENT (OUTPATIENT)
Dept: RADIOLOGY | Facility: CLINIC | Age: 64
End: 2024-05-02
Payer: COMMERCIAL

## 2024-05-02 ENCOUNTER — TELEPHONE (OUTPATIENT)
Dept: OBGYN CLINIC | Facility: CLINIC | Age: 64
End: 2024-05-02

## 2024-05-02 ENCOUNTER — OFFICE VISIT (OUTPATIENT)
Dept: OBGYN CLINIC | Facility: CLINIC | Age: 64
End: 2024-05-02
Payer: COMMERCIAL

## 2024-05-02 VITALS
DIASTOLIC BLOOD PRESSURE: 79 MMHG | WEIGHT: 228 LBS | TEMPERATURE: 71 F | HEIGHT: 66 IN | HEART RATE: 79 BPM | BODY MASS INDEX: 36.64 KG/M2 | RESPIRATION RATE: 18 BRPM | SYSTOLIC BLOOD PRESSURE: 114 MMHG | OXYGEN SATURATION: 98 %

## 2024-05-02 DIAGNOSIS — U09.9 COVID-19 LONG HAULER MANIFESTING CHRONIC MUSCLE PAIN: Primary | ICD-10-CM

## 2024-05-02 DIAGNOSIS — M79.10 COVID-19 LONG HAULER MANIFESTING CHRONIC MUSCLE PAIN: Primary | ICD-10-CM

## 2024-05-02 DIAGNOSIS — G24.01 TARDIVE DYSKINESIA: ICD-10-CM

## 2024-05-02 DIAGNOSIS — M23.203 OLD COMPLEX TEAR OF MEDIAL MENISCUS OF RIGHT KNEE: ICD-10-CM

## 2024-05-02 DIAGNOSIS — H81.11 BPPV (BENIGN PAROXYSMAL POSITIONAL VERTIGO), RIGHT: ICD-10-CM

## 2024-05-02 DIAGNOSIS — G89.29 COVID-19 LONG HAULER MANIFESTING CHRONIC MUSCLE PAIN: Primary | ICD-10-CM

## 2024-05-02 DIAGNOSIS — M17.11 PRIMARY OSTEOARTHRITIS OF RIGHT KNEE: Primary | ICD-10-CM

## 2024-05-02 DIAGNOSIS — M25.561 RIGHT KNEE PAIN, UNSPECIFIED CHRONICITY: ICD-10-CM

## 2024-05-02 PROCEDURE — 73562 X-RAY EXAM OF KNEE 3: CPT

## 2024-05-02 PROCEDURE — 99213 OFFICE O/P EST LOW 20 MIN: CPT | Performed by: FAMILY MEDICINE

## 2024-05-02 PROCEDURE — 95992 CANALITH REPOSITIONING PROC: CPT | Performed by: PHYSICAL THERAPIST

## 2024-05-02 PROCEDURE — 20610 DRAIN/INJ JOINT/BURSA W/O US: CPT | Performed by: FAMILY MEDICINE

## 2024-05-02 PROCEDURE — 97530 THERAPEUTIC ACTIVITIES: CPT | Performed by: PHYSICAL THERAPIST

## 2024-05-02 RX ORDER — TRIAMCINOLONE ACETONIDE 40 MG/ML
40 INJECTION, SUSPENSION INTRA-ARTICULAR; INTRAMUSCULAR
Status: COMPLETED | OUTPATIENT
Start: 2024-05-02 | End: 2024-05-02

## 2024-05-02 RX ORDER — BUPIVACAINE HYDROCHLORIDE 2.5 MG/ML
4 INJECTION, SOLUTION INFILTRATION; PERINEURAL
Status: COMPLETED | OUTPATIENT
Start: 2024-05-02 | End: 2024-05-02

## 2024-05-02 RX ADMIN — BUPIVACAINE HYDROCHLORIDE 4 ML: 2.5 INJECTION, SOLUTION INFILTRATION; PERINEURAL at 08:15

## 2024-05-02 RX ADMIN — TRIAMCINOLONE ACETONIDE 40 MG: 40 INJECTION, SUSPENSION INTRA-ARTICULAR; INTRAMUSCULAR at 08:15

## 2024-05-02 NOTE — PROGRESS NOTES
Subjective:    Chief Complaint   Patient presents with    Right Knee - Follow-up, Swelling, Pain     [place] a pop on Saturday a week ago        Katlyn Frye is a 63 y.o. female complains of right knee pain. Onset of the symptoms was  several years .  Mechanism of injury:  states that she aggravated the knee this past weekend when attempting to put flower in her car . Aggravating factors: walking  and weight bearing. Treatment to date:  visco injection therap ywhich has been effective . Symptoms have progressed to a point and plateaued.      The following portions were reviewed and updated as needed: allergies, current medications, past medical history, past social history, past surgical history and problem list.    Review of Systems   Constitutional: Negative for fever.   HENT: Negative for dental problem and headaches.    Eyes: Negative for vision loss.   Respiratory: Negative for cough and shortness of breath.    Cardiovascular: Negative for leg swelling and palpitations.   Gastrointestinal: Negative for constipation and diarrhea.   Genitourinary: Negative for bladder incontinence and difficulty urinating.   Musculoskeletal: Negative for back pain and difficulty walking.   Skin: Negative for rash and ulcer.   Neurological: Negative for dizziness and headaches.   Hem/Lymph/Immuno: Negative for blood clots. Does not bruise/bleed easily.   Psychiatric/Behavioral: Negative for confusion.         Objective:  General: no acute distress, non toxic, AAO x3   Skin: no skin changes, no rashes, no wounds or laceration  Vasculature: normal cap refill, no LE edema, normal popliteal and dorsalis pedis pulse  Neurologic:   Musculoskeletal: right KNEE EXAM  Gait: limping gait negative, able to weight bear without difficulty  Inspection: No gross deformity, no redness or warmth   Effusion: negative   Medial joint line TTP: positive  Lateral joint line TTP: negative  ROM: Full flexion and extension  Akshat's: negative,    Instability to varus/valgus stress: negative  Anterior Drawer: negative   Lachman's test: negative  Posterior Drawer: negative          Imaging:       Assessment/Plan:  1. Primary osteoarthritis of right knee    - XR knee 3 vw right non injury; Future    2. Old complex tear of medial meniscus of right knee    63-year-old female patient presenting for follow-up visit right knee pain.  Has been treated for right knee osteoarthritis with Visco injection therapy.  Radiographs were obtained, no acute fracture or dislocation, degenerative changes were noted.  Follow-up on official radiology report.  Clinical impression is the patient has had an exacerbation of underlying knee osteoarthritis.  Discussed role for corticosteroid injection-patient agreeable.  CSI performed in office today without complication. Return in 3 months for repeat visco injection therapy.

## 2024-05-02 NOTE — PROGRESS NOTES
Large joint arthrocentesis: R knee  Universal Protocol:  Consent: Verbal consent obtained.  Risks and benefits: risks, benefits and alternatives were discussed  Consent given by: patient  Patient identity confirmed: verbally with patient  Supporting Documentation  Indications: pain   Procedure Details  Location: knee - R knee  Preparation: Patient was prepped and draped in the usual sterile fashion  Needle size: 22 G  Ultrasound guidance: no  Approach: anterolateral  Medications administered: 4 mL bupivacaine 0.25 %; 40 mg triamcinolone acetonide 40 mg/mL    Patient tolerance: patient tolerated the procedure well with no immediate complications    Risks and benefits of CSI were discussed with patient extensively. Risks were highlighted which included but were not limited to infection, pain, local site swelling, and chance that injection may not be effective. Patient was also counseled regarding glucose elevation days after receiving CSI and to be mindful of diet and check sugars daily. Patient agreeable to proceed with CSI after counseling.

## 2024-05-02 NOTE — TELEPHONE ENCOUNTER
Called patient and left a detailed message. Her appointment that I scheduled for Dr Pierce in August was canceled, since its a visco injection we need to wait for approval and to receive medication. Patient will have to call back to schedule this appointment.

## 2024-05-02 NOTE — PROGRESS NOTES
"Daily Note     Today's date: 2024  Patient name: Katlyn Frye  : 1960  MRN: [id number]  Referring provider: Susie Lamb*  Dx:   Encounter Diagnosis     ICD-10-CM    1. COVID-19 long hauler manifesting chronic muscle pain  M79.10     U09.9     G89.29       2. BPPV (benign paroxysmal positional vertigo), right  H81.11       3. Tardive dyskinesia  G24.01                      Subjective: Pt reports less dizziness yesterday after last session, states she received cortisone injection into R knee today and is feeling painful.      Objective: See treatment diary below    +R jeffrey hallpike  3x R Epley    -L jeffrey hallpike  -R roll test  -L roll test    TA:  Pt education    NOT TODAY:  NMR: 2:30 mins on 3 mins off   Ambulating DT cog x 2 rounds  Ambulating head turns H/V   Hip abduction 3s iso hold, Hip extension 3s iso hold  Bridges (20x)  Hip add/abd  Tr A with SLR    Assessment: Tolerated treatment well. Performed positional re-testing today, noted +R jeffrey hallpike, able to clear symptoms wth 3x R Epley.  Deferred further interventions today d/t pt cortisone injection, pt states \"the doctor told me to rest and ice it today\".  PT will resume exercises next visit and re-assess for symptoms of vertigo.  Patient would benefit from continued PT      Plan: Continue per plan of care.      POC expires Unit limit Auth Expiration date PT/OT + Visit Limit?   24 BOMN pend N/a                           Visit/Unit Tracking  AUTH Status:  Date 3/21 IE  RE     pend Used                Remaining                             "

## 2024-05-03 ENCOUNTER — NURSE TRIAGE (OUTPATIENT)
Age: 64
End: 2024-05-03

## 2024-05-03 NOTE — TELEPHONE ENCOUNTER
"Pt reports cortisone shot to knee yesterday, blood sugar 338 and 305 this afternoon, 200s last night. Feels hot/cold, not voiding as much as usual, reports mild dizziness. Denies n/v/diarrhea, fever.  Called office, spoke with Radha.  Dr. Lamb states she will send an increased dose of ozempic that pt can take today. Metformin and insulin are also an option. Pt declines metformin/insulin, but agreeable to ozempic plan. Pt did recheck sugar while on the phone and it is still 305. Hyper/hypoglycemia home care reviewed with pt. Pt advised to call back with any questions, concerns, or worsening symptoms. Pt verbalized understanding.       Reason for Disposition  • Blood glucose > 300 mg/dL (16.7 mmol/L) AND two or more times in a row    Answer Assessment - Initial Assessment Questions  1. BLOOD GLUCOSE: \"What is your blood glucose level?\"       305  2. ONSET: \"When did you check the blood glucose?\"      Last night  3. USUAL RANGE: \"What is your glucose level usually?\" (e.g., usual fasting morning value, usual evening value)      120s  4. KETONES: \"Do you check for ketones (urine or blood test strips)?\" If yes, ask: \"What does the test show now?\"       N/a  5. TYPE 1 or 2:  \"Do you know what type of diabetes you have?\"  (e.g., Type 1, Type 2, Gestational; doesn't know)       Type 2  6. INSULIN: \"Do you take insulin?\" \"What type of insulin(s) do you use? What is the mode of delivery? (syringe, pen; injection or pump)?\"       N/a  7. DIABETES PILLS: \"Do you take any pills for your diabetes?\" If yes, ask: \"Have you missed taking any pills recently?\"      N/a, ozempic  8. OTHER SYMPTOMS: \"Do you have any symptoms?\" (e.g., fever, frequent urination, difficulty breathing, dizziness, weakness, vomiting)      Dizziness, no n/v  9. PREGNANCY: \"Is there any chance you are pregnant?\" \"When was your last menstrual period?\"      N/a    Protocols used: Diabetes - High Blood Sugar-ADULT-[place]    "

## 2024-05-06 ENCOUNTER — OFFICE VISIT (OUTPATIENT)
Age: 64
End: 2024-05-06
Payer: COMMERCIAL

## 2024-05-06 ENCOUNTER — TELEPHONE (OUTPATIENT)
Dept: OBGYN CLINIC | Facility: HOSPITAL | Age: 64
End: 2024-05-06

## 2024-05-06 DIAGNOSIS — U09.9 COVID-19 LONG HAULER MANIFESTING CHRONIC MUSCLE PAIN: Primary | ICD-10-CM

## 2024-05-06 DIAGNOSIS — G24.01 TARDIVE DYSKINESIA: ICD-10-CM

## 2024-05-06 DIAGNOSIS — M79.10 COVID-19 LONG HAULER MANIFESTING CHRONIC MUSCLE PAIN: Primary | ICD-10-CM

## 2024-05-06 DIAGNOSIS — G89.29 COVID-19 LONG HAULER MANIFESTING CHRONIC MUSCLE PAIN: Primary | ICD-10-CM

## 2024-05-06 DIAGNOSIS — H81.11 BPPV (BENIGN PAROXYSMAL POSITIONAL VERTIGO), RIGHT: ICD-10-CM

## 2024-05-06 PROCEDURE — 97112 NEUROMUSCULAR REEDUCATION: CPT

## 2024-05-06 NOTE — TELEPHONE ENCOUNTER
Caller: Katlyn    Doctor: Stan    Reason for call: Patient had cortisone injection in her right knee 05/02/24, states it was feeling little better but the pain has increased and got worse. No redness or hot to touch. She has pain when she bends it, going up and down stairs, and it wakes her at night. I did offer her an appointment today but would just like to speak to you.     Pain Scale: 6/10    Call back#: [phone]

## 2024-05-06 NOTE — PROGRESS NOTES
Daily Note     Today's date: 2024  Patient name: Katlyn Frye  : 1960  MRN: [id number]  Referring provider: Susie Lamb*  Dx:   Encounter Diagnosis     ICD-10-CM    1. COVID-19 long hauler manifesting chronic muscle pain  M79.10     U09.9     G89.29       2. BPPV (benign paroxysmal positional vertigo), right  H81.11       3. Tardive dyskinesia  G24.01                        Subjective: Pt reports less dizziness yesterday after last session.      Objective: See treatment diary below    +R jeffrey hallpike  3x R Epley  - R jeffrey hallpike    TA:  Pt education    NMR  Bridges (20x) with TrA cx 3s iso ea  Tr A with SLR 3s iso, 2x10/ea    NOT TODAY:  NMR: 2:30 mins on 3 mins off   Ambulating DT cog x 2 rounds  Ambulating head turns H/V   Hip abduction 3s iso hold, Hip extension 3s iso hold  Hip add/abd    Assessment: Tolerated treatment well. Performed positional re-testing today, noted +R jeffrey hallpike, able to clear symptoms wth 3x R Epley. Will reassess NV. Patient would benefit from continued PT      Plan: Continue per plan of care.      POC expires Unit limit Auth Expiration date PT/OT + Visit Limit?   24 BOMN pend N/a                           Visit/Unit Tracking  AUTH Status:  Date 3/21 IE  RE  5/ 5   pend Used                Remaining

## 2024-05-06 NOTE — TELEPHONE ENCOUNTER
I spoke to the patient over the telephone and discussed her concerns.  Advised to continue monitoring precautions were given to proceed to the emergency room.  Advised to ice and continue with Tylenol for pain relief.

## 2024-05-08 ENCOUNTER — OFFICE VISIT (OUTPATIENT)
Age: 64
End: 2024-05-08
Payer: COMMERCIAL

## 2024-05-08 DIAGNOSIS — M79.10 COVID-19 LONG HAULER MANIFESTING CHRONIC MUSCLE PAIN: ICD-10-CM

## 2024-05-08 DIAGNOSIS — G24.01 TARDIVE DYSKINESIA: ICD-10-CM

## 2024-05-08 DIAGNOSIS — U09.9 COVID-19 LONG HAULER MANIFESTING CHRONIC MUSCLE PAIN: ICD-10-CM

## 2024-05-08 DIAGNOSIS — H81.11 BPPV (BENIGN PAROXYSMAL POSITIONAL VERTIGO), RIGHT: Primary | ICD-10-CM

## 2024-05-08 DIAGNOSIS — G89.29 COVID-19 LONG HAULER MANIFESTING CHRONIC MUSCLE PAIN: ICD-10-CM

## 2024-05-08 PROCEDURE — 97530 THERAPEUTIC ACTIVITIES: CPT

## 2024-05-08 PROCEDURE — 95992 CANALITH REPOSITIONING PROC: CPT

## 2024-05-08 NOTE — PROGRESS NOTES
Daily Note     Today's date: 2024  Patient name: Katlyn Frye  : 1960  MRN: [id number]  Referring provider: Susie Lamb*  Dx:   No diagnosis found.                   Subjective: Pt reports less dizziness yesterday after last session.      Objective: See treatment diary below    +R jeffrey hallpike 36s upbeating torsional nystagmus  3x R Epley      Walking with walking sticks: adjusted to heights    Assessment: Tolerated treatment well. Performed positional re-testing today, noted +R jeffrey hallpike, able to clear symptoms wth 3x R Epley. Significant amount of time spent completing patient education on prognosis, recurrence, and clearance. Pt aware. Will reassess NV. Patient would benefit from continued PT      Plan: Continue per plan of care.      POC expires Unit limit Auth Expiration date PT/OT + Visit Limit?   24 BOMN pend N/a                           Visit/Unit Tracking  AUTH Status:  Date 3/21 IE  RE  5   pend Used                Remaining

## 2024-05-09 ENCOUNTER — OFFICE VISIT (OUTPATIENT)
Age: 64
End: 2024-05-09
Payer: COMMERCIAL

## 2024-05-09 DIAGNOSIS — G89.29 COVID-19 LONG HAULER MANIFESTING CHRONIC MUSCLE PAIN: ICD-10-CM

## 2024-05-09 DIAGNOSIS — H81.11 BPPV (BENIGN PAROXYSMAL POSITIONAL VERTIGO), RIGHT: Primary | ICD-10-CM

## 2024-05-09 DIAGNOSIS — M79.10 COVID-19 LONG HAULER MANIFESTING CHRONIC MUSCLE PAIN: ICD-10-CM

## 2024-05-09 DIAGNOSIS — G24.01 TARDIVE DYSKINESIA: ICD-10-CM

## 2024-05-09 DIAGNOSIS — U09.9 COVID-19 LONG HAULER MANIFESTING CHRONIC MUSCLE PAIN: ICD-10-CM

## 2024-05-09 PROCEDURE — 97530 THERAPEUTIC ACTIVITIES: CPT

## 2024-05-09 PROCEDURE — 95992 CANALITH REPOSITIONING PROC: CPT

## 2024-05-09 NOTE — PROGRESS NOTES
"Daily Note     Today's date: 2024  Patient name: Katlyn Frye  : 1960  MRN: [id number]  Referring provider: Susie Lamb*  Dx:   No diagnosis found.        Start Time: 1145  Stop Time: 1215  Total time in clinic (min): 30 minutes    Subjective: Pt reports less dizziness yesterday after last session.      Objective: See treatment diary below    +R jeffrey hallpike 16s upbeating torsional nystagmus  2x R Epley  - R jeffrey hallpike upon re-evaluation    Sidelying test R side negative      Assessment: Tolerated treatment well. Performed positional re-testing today, noted +R jeffrey hallpike, able to clear symptoms wth 2x R Epley. Significant amount of time spent completing patient education on prognosis, recurrence, and clearance. Pt reports feeling of fluid in her ear \"like a blockage.\" Is going to complete earwax management at home and then will reassess NV. Recommended pt to f/u with urgent care or ENT if fluid is leaking from ear. Pt aware. Patient would benefit from continued PT      Plan: Continue per plan of care.      POC expires Unit limit Auth Expiration date PT/OT + Visit Limit?   24 BOMN pend N/a                           Visit/Unit Tracking  AUTH Status:  Date 3/21 IE  RE    pend Used                Remaining                             "

## 2024-05-13 ENCOUNTER — OFFICE VISIT (OUTPATIENT)
Age: 64
End: 2024-05-13
Payer: COMMERCIAL

## 2024-05-13 DIAGNOSIS — U09.9 COVID-19 LONG HAULER MANIFESTING CHRONIC MUSCLE PAIN: ICD-10-CM

## 2024-05-13 DIAGNOSIS — H81.11 BPPV (BENIGN PAROXYSMAL POSITIONAL VERTIGO), RIGHT: Primary | ICD-10-CM

## 2024-05-13 DIAGNOSIS — M79.10 COVID-19 LONG HAULER MANIFESTING CHRONIC MUSCLE PAIN: ICD-10-CM

## 2024-05-13 DIAGNOSIS — G24.01 TARDIVE DYSKINESIA: ICD-10-CM

## 2024-05-13 DIAGNOSIS — G89.29 COVID-19 LONG HAULER MANIFESTING CHRONIC MUSCLE PAIN: ICD-10-CM

## 2024-05-13 PROCEDURE — 97530 THERAPEUTIC ACTIVITIES: CPT

## 2024-05-13 PROCEDURE — 95992 CANALITH REPOSITIONING PROC: CPT

## 2024-05-13 PROCEDURE — 97112 NEUROMUSCULAR REEDUCATION: CPT

## 2024-05-13 NOTE — PROGRESS NOTES
Daily Note     Today's date: 2024  Patient name: Katlyn Frye  : 1960  MRN: [id number]  Referring provider: Susie Lamb*  Dx:   Encounter Diagnosis     ICD-10-CM    1. BPPV (benign paroxysmal positional vertigo), right  H81.11       2. COVID-19 long hauler manifesting chronic muscle pain  M79.10     U09.9     G89.29       3. Tardive dyskinesia  G24.01                          Subjective: Pt reports room spinning dizziness over the weekend.      Objective: See treatment diary below    +R jeffrey hallpike 16s upbeating torsional nystagmus  1x Epley  Semont 1x  Semont plus 1x (2 min head hang, 5 min repositioning hold)    - R jeffrey hallpike  - R loaded jeffrey hallpike    Smooth pursuit: saccadic eye movement with alexanders law present  Head thrust: negative    Assessment: Tolerated treatment well. Performed positional re-testing today, noted +R jeffrey hallpike, able to clear symptoms 3x maneuvers. Completed semont plus. Patient negative upon reassessment with both loaded and regular jeffrey hallpike. Pt does have nystagmus present with sol's law in posiiton indicating likely hypofunction remaining. Will reassess NV. Patient would benefit from continued PT      Plan: Continue per plan of care.      POC expires Unit limit Auth Expiration date PT/OT + Visit Limit?   24 BOMN pend N/a                           Visit/Unit Tracking  AUTH Status:  Date 3/21 IE  RE  5   pend Used               Remaining

## 2024-05-16 ENCOUNTER — OFFICE VISIT (OUTPATIENT)
Age: 64
End: 2024-05-16
Payer: COMMERCIAL

## 2024-05-16 VITALS
SYSTOLIC BLOOD PRESSURE: 112 MMHG | BODY MASS INDEX: 36.64 KG/M2 | DIASTOLIC BLOOD PRESSURE: 80 MMHG | HEART RATE: 63 BPM | HEIGHT: 66 IN | OXYGEN SATURATION: 97 % | WEIGHT: 228 LBS

## 2024-05-16 DIAGNOSIS — K76.0 FATTY LIVER: Primary | ICD-10-CM

## 2024-05-16 DIAGNOSIS — U09.9 COVID-19 LONG HAULER MANIFESTING CHRONIC MUSCLE PAIN: ICD-10-CM

## 2024-05-16 DIAGNOSIS — G89.29 COVID-19 LONG HAULER MANIFESTING CHRONIC MUSCLE PAIN: ICD-10-CM

## 2024-05-16 DIAGNOSIS — G24.01 TARDIVE DYSKINESIA: ICD-10-CM

## 2024-05-16 DIAGNOSIS — H81.11 BPPV (BENIGN PAROXYSMAL POSITIONAL VERTIGO), RIGHT: Primary | ICD-10-CM

## 2024-05-16 DIAGNOSIS — M79.10 COVID-19 LONG HAULER MANIFESTING CHRONIC MUSCLE PAIN: ICD-10-CM

## 2024-05-16 PROCEDURE — 97112 NEUROMUSCULAR REEDUCATION: CPT

## 2024-05-16 PROCEDURE — 99213 OFFICE O/P EST LOW 20 MIN: CPT | Performed by: INTERNAL MEDICINE

## 2024-05-16 RX ORDER — TRAZODONE HYDROCHLORIDE 50 MG/1
50 TABLET ORAL
COMMUNITY
Start: 2024-04-24

## 2024-05-16 NOTE — PROGRESS NOTES
[facility] - Outpatient Note  Katlyn Frye 63 y.o. female MRN: [id number]  Encounter: [id number]      ASSESSMENT AND PLAN:    Katlyn Frye is a 63 y.o. old pleasant female with PMH of diabetes on Ozempic, bipolar, depression, hypothyroidism who presents for followup    Abdominal discomfort, abnormal bowel movements-symptoms completely resolved after stopping magnesium oxide and starting fiber supplementation.  She is very happy with the results.  Continue Metamucil  Continue holding off magnesium oxide  Hold off on any further workup  Okay to continue Ozempic  Follow-up as needed    Fatty liver, obesity-in the setting of obesity.  She has lost 60 pounds already in the past 1 to 2 years.  I congratulated her on her weight loss.  I recommended she lose 5 to 10% of her weight.  Avoid alcohol  Will check ultrasound elastography however patient reports this may be because prohibitive for her        1. Fatty liver    - US elastography/UGAP; Future    ______________________________________________________________________    SUBJECTIVE: Patient reports resolution of all her symptoms.  She is grateful.  She denies any abdominal pain nausea vomiting.  We discussed in detail of fatty liver.      Answers submitted by the patient for this visit:  Abdominal Pain Questionnaire (Submitted on 5/9/2024)  Chief Complaint: Abdominal pain  Chronicity: chronic  Onset: more than 1 month ago  Onset quality: undetermined  Frequency: rarely  Episode duration: 2 Hours  Progression since onset: resolved  Pain location: LUQ  Pain - numeric: 1/10  Pain quality: dull  Radiates to: LUQ  anorexia: No  arthralgias: No  belching: No  constipation: Yes  diarrhea: No  dysuria: No  fever: No  flatus: No  frequency: Yes  headaches: No  hematochezia: No  hematuria: No  melena: No  myalgias: No  nausea: No  weight loss: No  vomiting: No  Aggravated by: nothing  Relieved by: nothing  Diagnostic workup: lower endoscopy    I  reviewed prior external notes    I reviewed previous lab results and images      REVIEW OF SYSTEMS:     REVIEW OF ALL OTHER SYSTEMS IS OTHERWISE NEGATIVE.      Historical Information   Past Medical History:   Diagnosis Date    Arthritis     Bilateral knee    Asthma All my life; pretty mild these days    Bipolar 2 disorder (HCC)     Chronic kidney disease     Depression     Diabetes mellitus (HCC)     Disease of thyroid gland     GERD (gastroesophageal reflux disease)      Past Surgical History:   Procedure Laterality Date    ABDOMINAL SURGERY  Oct 2007    COLONOSCOPY      DILATION AND CURETTAGE OF UTERUS      ELECTROCONVULSIVE THERAPY      HYSTERECTOMY  2007    KIDNEY SURGERY Right 2009    KNEE SURGERY  Right meniscus repair    SINUS SURGERY  1987     Social History   Social History     Substance and Sexual Activity   Alcohol Use Not Currently    Comment: social 1 cup a day wine     Social History     Substance and Sexual Activity   Drug Use Never     Social History     Tobacco Use   Smoking Status Never    Passive exposure: Never   Smokeless Tobacco Never     Family History   Problem Relation Age of Onset    Heart disease Mother     Arthritis Mother     COPD Mother     Stroke Mother     Colon cancer Father     Neuropathy Father     Cancer Father         Colon cancer    Colon cancer Maternal Grandmother     Cancer Maternal Grandmother         Colon cancer    Diabetes Maternal Grandfather     Mental illness Paternal Grandfather     Heart attack Brother     Breast cancer Cousin     Asthma Sister     Depression Sister     Mental illness Sister     Heart disease Brother          of a massive heart attack       Meds/Allergies       Current Outpatient Medications:     albuterol (Ventolin HFA) 90 mcg/act inhaler    Amantadine HCl 100 MG tablet    clonazePAM (KlonoPIN) 1 mg tablet    ELDERBERRY PO    ergocalciferol (VITAMIN D2) 50,000 units    gabapentin (NEURONTIN) 300 mg capsule    Ingrezza 80 MG CAPS     "liothyronine (CYTOMEL) 25 mcg tablet    lithium carbonate (LITHOBID) 300 mg CR tablet    methocarbamol (ROBAXIN) 750 mg tablet    Ozempic, 0.25 or 0.5 MG/DOSE, 2 MG/3ML injection pen    pyridoxine (VITAMIN B6) 100 mg tablet    SUMAtriptan (IMITREX) 50 mg tablet    traZODone (DESYREL) 50 mg tablet    Vraylar 1.5 MG capsule    beclomethasone (QVAR REDIHALER) 40 MCG/ACT inhaler    levothyroxine 100 mcg tablet    Allergies   Allergen Reactions    Iv Contrast [Iodinated Contrast Media] Vomiting     Vomiting blood    Lamictal [Lamotrigine] Hives     rash    Latuda [Lurasidone] Hives     rash    Levaquin [Levofloxacin] Hives     rash    Dulaglutide Diarrhea    Benztropine Diarrhea, Other (See Comments) and Headache     Insomnia, muscle weakness acathisia worsening    Brexpiprazole Other (See Comments)     Feels like something is crawling under her skin when she sits she starts uncontrollable movement to rock back and off     Escitalopram Other (See Comments)    Food Rash    Lumateperone Anxiety, Irritability and Other (See Comments)           Objective     Blood pressure 112/80, pulse 63, height 5' 6\" (1.676 m), weight 103 kg (228 lb), SpO2 97%. Body mass index is 36.8 kg/m².      PHYSICAL EXAM:      General Appearance:   Alert, cooperative, no distress   HEENT:   Normocephalic, atraumatic, anicteric.     Neck:  Supple, symmetrical, trachea midline   Lungs:   Clear to auscultation bilaterally; no rales, rhonchi or wheezing; respirations unlabored    Heart::   Regular rate and rhythm; no murmur, rub, or gallop.   Abdomen:   Soft, non-tender, non-distended; normal bowel sounds; no masses, no organomegaly    Genitalia:   Deferred    Rectal:   Deferred    Extremities:  No cyanosis, clubbing or edema    Pulses:  2+ and symmetric    Skin:  No jaundice, rashes, or lesions    Lymph nodes:  No palpable cervical lymphadenopathy        Lab Results:   No visits with results within 1 Day(s) from this visit.   Latest known visit with " results is:   Appointment on 04/04/2024   Component Date Value    IgA 04/04/2024 439 (H)     Gliadin IgA 04/04/2024 6     Gliadin IgG 04/04/2024 4     Tissue Transglut Ab IGG 04/04/2024 2     TISSUE TRANSGLUTAMINASE * 04/04/2024 <2     Endomysial IgA 04/04/2024 Negative          Radiology Results:   XR knee 3 vw right non injury    Result Date: 5/2/2024  Narrative: RIGHT KNEE INDICATION:   Pain in right knee.   COMPARISON:  None. VIEWS:  XR KNEE 3 VW RIGHT NON INJURY FINDINGS: There is no acute fracture or dislocation. There is a moderate joint effusion. Compared to prior study 9/11/2023, there is moderate medial compartment degenerative narrowing with marginal osteophytes of the tibial plateau and femoral epicondyle, little change from the prior. Osteophyte at the lateral plateau and epicondyle as well.  Mild varus angulation secondarily. Patellofemoral space is preserved. No lytic or blastic osseous lesion. Soft tissues are unremarkable.     Impression: Moderate medial compartment degenerative changes as above, grossly stable from prior. Mild varus angulation secondarily. Moderate effusion. No fracture. Electronically signed: 05/02/2024 09:37 AM Yoshi Nagel MD

## 2024-05-16 NOTE — PROGRESS NOTES
Daily Note     Today's date: 2024  Patient name: Katlyn Frye  : 1960  MRN: [id number]  Referring provider: Susie Lamb*  Dx:   Encounter Diagnosis     ICD-10-CM    1. BPPV (benign paroxysmal positional vertigo), right  H81.11       2. COVID-19 long hauler manifesting chronic muscle pain  M79.10     U09.9     G89.29       3. Tardive dyskinesia  G24.01                          Subjective: Pt reports room spinning dizziness over the weekend.      Objective: See treatment diary below  Kyle Hallpike Negative B/L  Loaded Linn Hallpike: negative B/L 2x  Sidelying test: negative 3x    Smooth pursuit: saccadic eye movement with alexanders law present  Convergence: ~ 5 inches  Saccades: abnormal dysmetric all directions  Head thrust: positive R side  X1 viewingabnormal symptomatic dizzy  Vor cancellation symptomatic nausea      X1 viewing 30s x2 ea H/V  vOR cancellation 30s x2  Pencil push ups 10x    Access Code: [email or web address]       Assessment: Tolerated treatment well. Performed positional re-testing today, noted negative but symptomatic. Again sol's law presents with gaze towards the R. Full vestibular oculomotor exam and found to be abnormal. Positive head thrust on R side. Initiated gaze stabilization exercises which induced headache. Progressed HEP to include x1 viewing, VOR cancellation and pencil pushups. Pt demo good technique. . Patient would benefit from continued PT      Plan: Continue per plan of care.      POC expires Unit limit Auth Expiration date PT/OT + Visit Limit?   24 BOMN pend N/a                           Visit/Unit Tracking  AUTH Status:  Date 3/21 IE  RE  5   pend Used              Remaining

## 2024-05-20 ENCOUNTER — OFFICE VISIT (OUTPATIENT)
Age: 64
End: 2024-05-20
Payer: COMMERCIAL

## 2024-05-20 DIAGNOSIS — H81.11 BPPV (BENIGN PAROXYSMAL POSITIONAL VERTIGO), RIGHT: Primary | ICD-10-CM

## 2024-05-20 DIAGNOSIS — G89.29 COVID-19 LONG HAULER MANIFESTING CHRONIC MUSCLE PAIN: ICD-10-CM

## 2024-05-20 DIAGNOSIS — U09.9 COVID-19 LONG HAULER MANIFESTING CHRONIC MUSCLE PAIN: ICD-10-CM

## 2024-05-20 DIAGNOSIS — G24.01 TARDIVE DYSKINESIA: ICD-10-CM

## 2024-05-20 DIAGNOSIS — M79.10 COVID-19 LONG HAULER MANIFESTING CHRONIC MUSCLE PAIN: ICD-10-CM

## 2024-05-20 PROCEDURE — 97112 NEUROMUSCULAR REEDUCATION: CPT

## 2024-05-20 NOTE — PROGRESS NOTES
Daily Note     Today's date: 2024  Patient name: Katlyn Frye  : 1960  MRN: [id number]  Referring provider: Susie Lamb*  Dx:   Encounter Diagnosis     ICD-10-CM    1. BPPV (benign paroxysmal positional vertigo), right  H81.11       2. COVID-19 long hauler manifesting chronic muscle pain  M79.10     U09.9     G89.29       3. Tardive dyskinesia  G24.01                          Subjective: Pt reports lightheadedness and dizziness, but denies room-spinning sensation.      Objective: See treatment diary below    BP: 124/82, seated L arm    Dizziness: 0/10  Smooth pursuit: saccadic eye movement with ileana law present    Seated VORx1 H+V 3 x 30 / ea   H: 0-4/10 dizziness; req cue to inc speed + 4/10 frontal HA   V: 5/10 nausea first set; frontal HA second set; no symptoms third set    Seated VORcx H+V x 30s / ea   H: asymptomatic   V headache    Standing FA VORcx H+V 2 x 30s / ea   H asymptomatic    V headache     Standing FT VORx1 H with ball x 30s   Asymptomatic     Fwd/bwd amb with VORx1 on eye chart   1 cycle 10' = nausea     Edu to progress HEP from seated to standing    Assessment: Tolerated treatment well. Again sol's law presents with gaze towards the R. Continued gaze stabilization exercises which induced headache, dizziness, and/or nausea. Able to return to baseline within 30s. Pt demo good technique.Patient would benefit from continued PT      Plan: Continue per plan of care.      POC expires Unit limit Auth Expiration date PT/OT + Visit Limit?   24 BOMN pend N/a                           Visit/Unit Tracking  3/21 IE  RE

## 2024-05-22 ENCOUNTER — TELEPHONE (OUTPATIENT)
Age: 64
End: 2024-05-22

## 2024-05-22 ENCOUNTER — EVALUATION (OUTPATIENT)
Age: 64
End: 2024-05-22
Payer: COMMERCIAL

## 2024-05-22 ENCOUNTER — TELEPHONE (OUTPATIENT)
Dept: OBGYN CLINIC | Facility: HOSPITAL | Age: 64
End: 2024-05-22

## 2024-05-22 DIAGNOSIS — G89.29 COVID-19 LONG HAULER MANIFESTING CHRONIC MUSCLE PAIN: ICD-10-CM

## 2024-05-22 DIAGNOSIS — U09.9 COVID-19 LONG HAULER MANIFESTING CHRONIC MUSCLE PAIN: ICD-10-CM

## 2024-05-22 DIAGNOSIS — G24.01 TARDIVE DYSKINESIA: ICD-10-CM

## 2024-05-22 DIAGNOSIS — M79.10 COVID-19 LONG HAULER MANIFESTING CHRONIC MUSCLE PAIN: ICD-10-CM

## 2024-05-22 DIAGNOSIS — H81.11 BPPV (BENIGN PAROXYSMAL POSITIONAL VERTIGO), RIGHT: Primary | ICD-10-CM

## 2024-05-22 DIAGNOSIS — M17.11 PRIMARY OSTEOARTHRITIS OF RIGHT KNEE: Primary | ICD-10-CM

## 2024-05-22 DIAGNOSIS — R42 VERTIGO: Primary | ICD-10-CM

## 2024-05-22 PROCEDURE — 97530 THERAPEUTIC ACTIVITIES: CPT

## 2024-05-22 PROCEDURE — 97164 PT RE-EVAL EST PLAN CARE: CPT

## 2024-05-22 NOTE — TELEPHONE ENCOUNTER
Physical therapist recommends patient have a VNG test with an audiologist.  Asking if you would refer.     Please let patient know

## 2024-05-22 NOTE — PROGRESS NOTES
PT Re-Evaluation /Progress         POC expires Unit limit Auth Expiration date PT/OT + Visit Limit?   24 BOMN pend N/a                           Visit/Unit Tracking  AUTH Status:  Date 3/21 IE              pend Used                Remaining                         Today's date: 2024  Patient name: Katlyn Frye  : 1960  MRN: [id number]  Referring provider: Susie Lamb*  Dx:   Encounter Diagnosis     ICD-10-CM    1. BPPV (benign paroxysmal positional vertigo), right  H81.11       2. COVID-19 long hauler manifesting chronic muscle pain  M79.10     U09.9     G89.29       3. Tardive dyskinesia  G24.01           Assessment  Assessment details: Patient is a 63 y.o. Female who presents to skilled outpatient PT with complaints of reduced activity tolerance, weakness, and balance impairments 2/2 long-haul COVID. Co-morbidities impacting quality of care include R knee arthritis, L knee injury, tardive dyskinesia, and asthma. POC complicated by L knee injection. Patient continues to have reduced righting reactions, impaired balance, dec LE strength, and impaired functional mobility/transfers. Patient is a good candidate to continue to receive skilled OPPT services to improve functional. Since last progress note, pt has demo vertigo, BPPV and positive head thrust indicating a hypofunction residually, she also demonstrates central signs/dizziness with smooth pursuits, and saccades. She also reports multiple near falls and falls at home due to recent vertigo. Has been completing treatment for VRT including repositioning maneuvers as well as gaze stabilization and balance training. Pt continues to present with inconsistent symptoms with exercises. Would recommend VNG at this time to confirm peripheral vs central causes. Pt does report that she had assessment completed with a hospital in [place] due to suspected medication  complication/side effect of vertigo. She is unsure of results. She will obtain reports and update NV. Should assessment not include VNG, complete VNG testing then resume PT. Significant amount of time spent completing patient education comparing peripheral vs central causes of dizziness, results, prognosis of VRT, and new goals established.       Impairments: Abnormal coordination, Abnormal gait, Abnormal muscle tone, Abnormal or restricted ROM, Activity intolerance, Impaired balance, Impaired physical strength, Lacks appropriate HEP, Poor posture, Poor body mechanics, Pain with function, Safety issue, Weight-bearing intolerance, Abnormal movement, Difficulty understanding, Abnormal muscle firing  Understanding of Dx/Px/POC: Good  Prognosis: Good    Patient verbalized understanding of POC.         Please contact me if you have any questions or recommendations. Thank you for the referral and the opportunity to share in Katlyn Frye's care.        Plan  Plan details:   Patient would benefit from: Skilled PT  Planned modality interventions: Biofeedback, Cryotherapy, TENS, Thermotherapy  Planned therapy interventions: Abdominal trunk stabilization, ADL training, Balance, Balance/WB training, Breathing training, Body mechanics training, Coordination, Functional ROM exercises, Gait training, HEP, Joint Mobilization, Manual Therapy, Perez taping, Motor coordination training, Neuromuscular re-education, Patient education, Postural training, Strengthening, Stretching, Therapeutic activities, Therapeutic exercises, Therapeutic training, Transfer training, Activity modification, Work reintegration  Frequency: 1-2x/wk  Duration in weeks: 12 weeks  Plan of Care beginning date: 5/22/24  Plan of Care expiration date: 3 months - 8/22/2024  Treatment plan discussed with: Patient      Goals  Short Term Goals (4 weeks):    - Patient will improve time on TUG by 2.9 seconds to facilitate improved safety in all ambulation  -  "Patient will be independent in basic HEP 2-3 weeks  - Patient will improve 5xSTS score by 2.3 seconds to promote improved LE functional strength needed for ADLs  - Patient will complete FGA    Long Term Goals (12 weeks):  - Patient will be independent in a comprehensive home exercise program  - Patient will improve gait speed by 0.18 m/s to improve safety with community ambulation  - Patient will improve LERMA by 6 points in order to improve static balance and reduce risk for falls  - Patient will improve scoring on FGA by 4 points to progress safety with dynamic tasks  - Patient will be able to demonstrate HT in gait without veering  - Patient will improve 6 Minute Walk Test score by 190 feet to promote improved cardiovascular endurance  - Patient will report 50% reduction in near falls in order to improve safety with functional tasks and reduce his risk for falls  - Patient will report going on walks at least 3 days per week to promote independence and improved cardiovascular endurance  - Patient will be able to ascend/descend stairs reciprocally with 1 UE assist to promote independence and safety with ADLs  - Patient will report 50% reduction in near falls when ambulating on uneven terrain    NEW   - patient will have negative head thrust B/L  - patient will be asymptomatic with VOMs  - patient will complete MCSTIB  - patient will complete DHI      Subjective    History of Present Illness  - Mechanism of injury: Patient had COVID-19 in early 2020 and December 2023 which resulted in reduced activity tolerance (fatigued when walking to mailbox). She has tardive dyskinesia which worsens with fatigue. She has B/L knee problems (injury L and arthritis R).     UPDATE 4/19/24: Pt is completing HEP \"pretty much daily\" occasionally reporting missing a day. She is walking 6-8 minutes 2xday and hip strengthening. She did have POC complication of L knee pain and had CSI on L side 4/18. She reports she feels her therapy has " "been a bit too aggressive and would request to \"scale it back a bit\" and work towards slow progression.     UPDATE 24: PATIENT CONTINUES TO HAVE VERTIGO SYMPTOMS, INCONSISTENT PRESENTATION, IS AMBULATING WITH WALKING STICKS AND HAS MULTIPLE LOB WITH THEM REQUIRING MIN A X1 FROM THERAPIST TO CORRECT      - Primary AD: none  - Assist level at home: IND  - Decreased fine motor tasks: No    Patient goal: \"Improve strength and balance.\"    Pain  - Current pain ratin/10  - At best pain ratin/10  - At worst pain ratin/10  - Location: L knee  - Aggravating factors: stairs    Social Support  Steps to enter house: 4 steps   Stairs in house: 12 steps with HR   Lives in: 2 story   Lives with: alone     Employment status: retired   Hand dominance: left     Treatments  - Previous treatment: neuro PT for BPPV and ortho PT  - Current treatment: n/a  - Diagnostic Testing: n/a       Objective     LE MMT  - R Hip Flexion: 4-/5   L Hip Flexion: 3+/5  - R Hip Abduction: 4-/5  L Hip Abduction: 4-/5  - R Hip Adduction: 4-/5  L Hip Adduction: 4-/5  - R Knee Extension: 4/5  L Knee Extension: 4-/5  - R Knee Flexion: 4/5   L Knee Flexion: 4-/5  - R Ankle DF: 3+/5   L Ankle DF: 3+/5  - R Ankle PF: 3+/5   L Ankle PF: 3+/5    Sensation  - Light touch: intact  - Deep pressure: intact    Coordination  - Heel to Kong: slowed; likely 2/2 weakness  - Alternate Toe Taps: WFL    OT Screen  - Difficulty w/ clothing fasteners: No  - Difficulty w/ bathing: No  - Difficulty w/ dressing: No  - Difficulty w/ toileting: No    Gait  - Abnormalities: reduced arm swing, reduced floor clearance    Oculomotor Screen  - Baseline Symptoms: 0/10  - Baseline Observation: wfl  - Gaze Holding Nystagmus: H: Normal Dizziness: 0/10, Observation: wfl  - Spontaneous Nystagmus Room Light: H: Normal Dizziness: 0/10, Observation: wfl  - Smooth Pursuits (central): H: Abnormal Dizziness: 4/10, Observation: saccadic  - Saccades (central): H: Abnormal Dizziness: " "4/10, Observation: dysmetric, V>H  - Near Point Convergence (normal: < 4\"/10 cm - central): H: Abnormal Dizziness: 4/10, Observation: 8 inches  - VORx1: H: Abnormal Dizziness: 0/10, Observation: unable to fixate  - VOR Cancel (central): H: Normal Dizziness: 0/10, Observation: patient feels \"off\"  - Head Thrust (moderate to severe hypofunction): H: Abnormal Dizziness: 0/10, Observation: positive  - Head Shaking Test (mild hypofunction): H: Abnormal Dizziness: 6/10, Observation: no nystagmus  - Dynamic Visual Acuity (2 Hz):   Static Head: 20/18 Line   Dynamic Head: 20/10 Line   Difference in number of lines: 4 (abnormal if 3 or >)    BPPV Screen  - L Kyle-Hallpike: wfl  - R Kyle-Hallpike: wfl  - L Horizontal Roll: wfl  - R Horizontal Roll: wfl  - L Log Roll: wfl  - R Log Roll: wfl       Outcome Measures Initial Eval  3/21 4/19 DEFER DUE TO VERTIGO      5xSTS 18.29 sec Defer due to CSI       TUG  - Regular  - Cognitive   12.82 sec  16.99 sec   6.63 sec  NT       10 meter 0.97 m/s 1.1m/s        LERMA 40/56 46/56       FGA NV        mCTSIB  - FTEO (firm)  - FTEC (firm)  - FTEO (foam)  - FTEC (foam)   30 sec, mild sway  28.2 sec  4.2 sec  defer   30s   30s  30s min sway  30s SEVERE sway       6MWT 846 ft 400ft 2mWT  Unable to complete full due to L knee pain ended 4:17                DHI                Precautions:   Past Medical History:   Diagnosis Date    Arthritis     Bilateral knee    Asthma All my life; pretty mild these days    Bipolar 2 disorder (HCC) 1990    Chronic kidney disease     Depression     Diabetes mellitus (HCC)     Disease of thyroid gland 2009    GERD (gastroesophageal reflux disease)       "

## 2024-05-22 NOTE — TELEPHONE ENCOUNTER
Caller: Patient    Doctor: Stan     Reason for call: Patient isn't having any pain relief of her right knee after having an injection on 5/02 and is requesting a script for PT    Call back#: [phone]

## 2024-05-23 ENCOUNTER — OFFICE VISIT (OUTPATIENT)
Age: 64
End: 2024-05-23
Payer: COMMERCIAL

## 2024-05-23 DIAGNOSIS — G24.01 TARDIVE DYSKINESIA: ICD-10-CM

## 2024-05-23 DIAGNOSIS — G89.29 COVID-19 LONG HAULER MANIFESTING CHRONIC MUSCLE PAIN: ICD-10-CM

## 2024-05-23 DIAGNOSIS — H81.11 BPPV (BENIGN PAROXYSMAL POSITIONAL VERTIGO), RIGHT: Primary | ICD-10-CM

## 2024-05-23 DIAGNOSIS — U09.9 COVID-19 LONG HAULER MANIFESTING CHRONIC MUSCLE PAIN: ICD-10-CM

## 2024-05-23 DIAGNOSIS — M79.10 COVID-19 LONG HAULER MANIFESTING CHRONIC MUSCLE PAIN: ICD-10-CM

## 2024-05-23 PROCEDURE — 97112 NEUROMUSCULAR REEDUCATION: CPT

## 2024-05-23 PROCEDURE — 97530 THERAPEUTIC ACTIVITIES: CPT

## 2024-05-23 NOTE — PROGRESS NOTES
Daily Note     Today's date: 2024  Patient name: Katlyn Frye  : 1960  MRN: [id number]  Referring provider: Susie Lamb*  Dx:   Encounter Diagnosis     ICD-10-CM    1. BPPV (benign paroxysmal positional vertigo), right  H81.11       2. COVID-19 long hauler manifesting chronic muscle pain  M79.10     U09.9     G89.29       3. Tardive dyskinesia  G24.01                          Subjective: Pt came in today due to having a vertigo episode last night for greater than 2 minutes and ended in throwing up.       Objective: See treatment diary below  NMR:  Smooth pursuits 20 seconds saccadic  Convergence/pencil push up 2x Right eye exophoria     TA:  VNG physical handout provided     Performed by PT:  Sidelying test R negative  Sidelying test L positive      Assessment: Tolerated treatment well. Primary PT consulted throughout entire session. Patient nauseous and symptomatic with smooth pursuits and convergence exercises. Primary PT assessed positionals. Educated patient on VNG testing, primary Pt to contact PCP. Patient would benefit from continued PT      Plan: Continue per plan of care.      POC expires Unit limit Auth Expiration date PT/OT + Visit Limit?   24 BOMN pend N/a                           Visit/Unit Tracking  3/21 IE  RE  5/ 5   5

## 2024-05-29 ENCOUNTER — APPOINTMENT (OUTPATIENT)
Age: 64
End: 2024-05-29
Payer: COMMERCIAL

## 2024-05-30 ENCOUNTER — TELEPHONE (OUTPATIENT)
Dept: NEPHROLOGY | Facility: CLINIC | Age: 64
End: 2024-05-30

## 2024-05-31 ENCOUNTER — APPOINTMENT (OUTPATIENT)
Age: 64
End: 2024-05-31
Payer: COMMERCIAL

## 2024-06-03 ENCOUNTER — OFFICE VISIT (OUTPATIENT)
Age: 64
End: 2024-06-03
Payer: COMMERCIAL

## 2024-06-03 VITALS
RESPIRATION RATE: 18 BRPM | SYSTOLIC BLOOD PRESSURE: 100 MMHG | OXYGEN SATURATION: 94 % | HEART RATE: 73 BPM | HEIGHT: 66 IN | WEIGHT: 227.6 LBS | BODY MASS INDEX: 36.58 KG/M2 | TEMPERATURE: 97.9 F | DIASTOLIC BLOOD PRESSURE: 64 MMHG

## 2024-06-03 DIAGNOSIS — R19.7 DIARRHEA, UNSPECIFIED TYPE: ICD-10-CM

## 2024-06-03 DIAGNOSIS — E03.9 ACQUIRED HYPOTHYROIDISM: ICD-10-CM

## 2024-06-03 DIAGNOSIS — H93.8X3 SENSATION OF FULLNESS IN BOTH EARS: Primary | ICD-10-CM

## 2024-06-03 PROCEDURE — 99214 OFFICE O/P EST MOD 30 MIN: CPT | Performed by: FAMILY MEDICINE

## 2024-06-03 NOTE — PROGRESS NOTES
"Ambulatory Visit  Name: Katlyn Frye      : 1960      MRN: [id number]  Encounter Provider: Susie Lamb DO  Encounter Date: 6/3/2024   Encounter department: [facility] [place]    Assessment & Plan   1. Sensation of fullness in both ears-no cerumen impaction noted on exam.  No fluid noted behind TM.  Likely secondary to eustachian tube dysfunction which could also be contributing to her probable vertigo.  Patient scheduled for an VNG on Wednesday.  Patient will likely need to follow-up with ENT after that  2. Diarrhea, unspecified type-resolved   3. Acquired hypothyroidism-well-controlled pe 3/11/2024 TSH is within normal meds.  Patient to continue levothyroxine 100 mcg daily and liothyronine 25 mcg daily.  Patient follows with endocrinology       History of Present Illness     Patient presents for ear evaluation.  She has been dealing with some fullness sensation of both ears.  Also she has upcoming VNG due to persistent dizziness from possible vertigo.  Will like to have her ears cleaned if needed.  Discussed previous history of persistent diarrhea.  This is since resolved.        Review of Systems   Constitutional:  Negative for fever.   HENT:  Negative for ear discharge and ear pain.    Gastrointestinal:  Negative for diarrhea.       Objective     /64 (BP Location: Left arm, Patient Position: Sitting, Cuff Size: Standard)   Pulse 73   Temp 97.9 °F (36.6 °C) (Tympanic)   Resp 18   Ht 5' 6\" (1.676 m)   Wt 103 kg (227 lb 9.6 oz)   SpO2 94%   BMI 36.74 kg/m²     Physical Exam  HENT:      Head: Normocephalic.      Right Ear: Tympanic membrane and external ear normal.      Left Ear: Tympanic membrane and external ear normal.   Eyes:      Conjunctiva/sclera: Conjunctivae normal.   Cardiovascular:      Rate and Rhythm: Normal rate.   Pulmonary:      Effort: Pulmonary effort is normal.   Neurological:      Mental Status: She is alert and oriented to person, place, and " time.      Gait: Gait normal.       Administrative Statements

## 2024-06-03 NOTE — LETTER
Bisi 3, 2024     Patient: Katlyn Frye  YOB: 1960  Date of Visit: 3/26/2024      To Whom it May Concern:    Katlyn Frye is under my professional care. Katlyn was seen in my office on 3/26/2024 for acute, progressive diarrhea.  During the exam she had significant lower abdominal tenderness.  There was immediate concern for appendicitis versus colitis versus diverticulitis.  An urgent CT abdomen pelvis was medically indicated  for further evaluation.      If you have any questions or concerns, please don't hesitate to call.         Sincerely,          Dr. Susie Lamb DO    starting tomorrow, leave bandage alone/Shower only

## 2024-06-05 ENCOUNTER — OFFICE VISIT (OUTPATIENT)
Dept: AUDIOLOGY | Age: 64
End: 2024-06-05
Payer: COMMERCIAL

## 2024-06-05 DIAGNOSIS — R42 VERTIGO: ICD-10-CM

## 2024-06-05 DIAGNOSIS — R42 DIZZINESS: Primary | ICD-10-CM

## 2024-06-05 PROCEDURE — 92537 CALORIC VSTBLR TEST W/REC: CPT

## 2024-06-05 PROCEDURE — 92567 TYMPANOMETRY: CPT

## 2024-06-05 PROCEDURE — 92540 BASIC VESTIBULAR EVALUATION: CPT

## 2024-06-05 NOTE — PROGRESS NOTES
Videonystagmography (VNG) Evaluation    Name:  Katlyn Frye  :  1960  Age:  63 y.o.  MRN:  [id number]  Date of Evaluation: 24     HISTORY:     Reason for visit: Dizziness    Katlyn Frye is seen today at the request of Dr. Lamb for VNG testing. Katlyn was unaccompanied to today's visit. She was driven to today's appointment by her friend. Patient noted longstanding balance difficulty. She noted having a normal VNG performed in January in [place]. These results are unable to be seen. She reports since then she notes lightheaded, spinning, nausea especially when rolling around in bed at night. Symptoms last seconds to  minutes and have been on and off for the last month. She is seeing Physical Therapy for BPPV treatment.    Otoscopic Evaluation:   Right Ear: Unremarkable, canal clear   Left Ear: Unremarkable, canal clear    Tympanometry:   Right: Type A; normal middle ear pressure and static compliance    Left: Type A; normal middle ear pressure and static compliance     Oculomotor battery:   Gaze:   Right: Within normal limits  Left: Within normal limits  Up: Within normal limits  Down: Within normal limits      Tracking: Abnormal: Gain bilaterally    Saccades: Abnormal: Velocity bilaterally and Latency bilaterally     Optokinetic: Abnormal: gain at 40dps    Positioning/Positionals:     Kyle Palomino [place]umpkin:    Right: Positive, 4 DB, LB noted but not recorded due to eye blinks. Subjective dizziness noted.     Left: Positive, 21 RB, 17 DBSubjective dizziness noted.      **Dizziness was worse on left      Positionals:   Sitting: Within normal limits square jerks  Supine: Within normal limits Square jerks, 2 LB  Head Right:Within normal limits Square jerks  Head Left: Within normal limits square jerks ** dizziness when turning head    Calorics: (Normal response <25% difference)    Bithermal Caloric Irrigation: Within normal limits.     Caloric irrigations  completed without incident with good parting  otoscopy noted.       **failure to fixate RC     IMPRESSIONS:     Abnormal: Mixed results   Poor ocular motors and square jerks - central indications   Positive Hallpike's - BPPV    RECOMMENDATIONS:     1) Follow-up with referring provider to review today's results.  2) Continue to monitor dizziness symptoms. If symptoms worsen or fail to improve prior to follow-up with their referring provider, contact your primary care/or referring provider and/or urgent medical attention should be considered.  3) Fall precautions were discussed at length with the patient. Most test effects are expected to subside shortly after testing is completed, it was recommended that they use caution moving around for the remainder of the day.   4) Consider vestibular physical therapy evaluation and rehabilitation through [facility]      Mary Garza., [facility]  Clinical Audiologist  [facility]  [address] [id number]

## 2024-06-06 ENCOUNTER — TELEPHONE (OUTPATIENT)
Dept: ADMINISTRATIVE | Facility: OTHER | Age: 64
End: 2024-06-06

## 2024-06-06 ENCOUNTER — OFFICE VISIT (OUTPATIENT)
Age: 64
End: 2024-06-06
Payer: COMMERCIAL

## 2024-06-06 VITALS
BODY MASS INDEX: 36.48 KG/M2 | HEIGHT: 66 IN | WEIGHT: 227 LBS | TEMPERATURE: 94 F | HEART RATE: 70 BPM | SYSTOLIC BLOOD PRESSURE: 118 MMHG | DIASTOLIC BLOOD PRESSURE: 70 MMHG | OXYGEN SATURATION: 98 %

## 2024-06-06 DIAGNOSIS — Z12.4 CERVICAL CANCER SCREENING: ICD-10-CM

## 2024-06-06 DIAGNOSIS — H81.13 BENIGN PAROXYSMAL POSITIONAL VERTIGO DUE TO BILATERAL VESTIBULAR DISORDER: Primary | ICD-10-CM

## 2024-06-06 PROCEDURE — 99213 OFFICE O/P EST LOW 20 MIN: CPT

## 2024-06-06 NOTE — PROGRESS NOTES
"Ambulatory Visit  Name: Katlyn Frye      : 1960      MRN: [id number]  Encounter Provider: Bonifacio Davidson PA-C  Encounter Date: 2024   Encounter department: [facility] [place]    Assessment & Plan   1. Benign paroxysmal positional vertigo due to bilateral vestibular disorder  Assessment & Plan:  Sensation of fullness in both ears-no cerumen impaction noted on exam.  No fluid noted behind TM.   Has been doing vestibular PT without resolution.   Discussed results and plan with Dr. Lamb, we mutually agreed to follow with ENT for further eval and management    VNG results reviewed  \"Abnormal: Mixed results              Poor ocular motors and square jerks - central indications              Positive Hallpike's - BPPV\"      Orders:  -     Ambulatory Referral to Physical Therapy; Future  -     Ambulatory Referral to Otolaryngology; Future  2. Cervical cancer screening  -     Ambulatory Referral to Obstetrics / Gynecology; Future       History of Present Illness     Patient is here for follow-up from VNG testing and wants to go over results.  Symptoms have been sensation of clogged ears and dizziness.  Has been seeing vestibular PT  No new symptoms or concerns        Review of Systems  Pertinent Medical History         Medical History Reviewed by provider this encounter:  Tobacco  Allergies  Meds  Problems  Med Hx  Surg Hx  Fam Hx       Past Medical History   Past Medical History:   Diagnosis Date    Arthritis     Bilateral knee    Asthma All my life; pretty mild these days    Bipolar 2 disorder (HCC)     Chronic kidney disease     Depression     Diabetes mellitus (HCC)     Disease of thyroid gland     GERD (gastroesophageal reflux disease)      Past Surgical History:   Procedure Laterality Date    ABDOMINAL SURGERY  Oct 2007    COLONOSCOPY      DILATION AND CURETTAGE OF UTERUS      ELECTROCONVULSIVE THERAPY      HYSTERECTOMY  2007    KIDNEY SURGERY Right 2009    " KNEE SURGERY  Right meniscus repair    SINUS SURGERY       Family History   Problem Relation Age of Onset    Heart disease Mother     Arthritis Mother     COPD Mother     Stroke Mother     Colon cancer Father     Neuropathy Father     Cancer Father         Colon cancer    Colon cancer Maternal Grandmother     Cancer Maternal Grandmother         Colon cancer    Diabetes Maternal Grandfather     Mental illness Paternal Grandfather     Heart attack Brother     Breast cancer Cousin     Asthma Sister     Depression Sister     Mental illness Sister     Heart disease Brother          of a massive heart attack     Current Outpatient Medications on File Prior to Visit   Medication Sig Dispense Refill    albuterol (Ventolin HFA) 90 mcg/act inhaler Inhale 2 puffs every 6 (six) hours as needed for wheezing 18 g 5    Amantadine HCl 100 MG tablet Take 100 mg by mouth 3 (three) times a day      clonazePAM (KlonoPIN) 1 mg tablet Take 1 mg by mouth 2 (two) times a day      ELDERBERRY PO Take by mouth      ergocalciferol (VITAMIN D2) 50,000 units       gabapentin (NEURONTIN) 300 mg capsule Take 300 mg by mouth 3 (three) times a day      Ingrezza 80 MG CAPS 80 mg      liothyronine (CYTOMEL) 25 mcg tablet Take 25 mcg by mouth daily      lithium carbonate (LITHOBID) 300 mg CR tablet TAKE 3 TABLETS BY ORAL ROUTE AT BEDTIME      methocarbamol (ROBAXIN) 750 mg tablet 750 mg      Ozempic, 0.25 or 0.5 MG/DOSE, 2 MG/3ML injection pen       pyridoxine (VITAMIN B6) 100 mg tablet Take 100 mg by mouth daily      SUMAtriptan (IMITREX) 50 mg tablet Take 50 mg by mouth daily as needed      traZODone (DESYREL) 50 mg tablet Take 50 mg by mouth daily at bedtime      Vraylar 1.5 MG capsule Take 1.5 mg by mouth daily      beclomethasone (QVAR REDIHALER) 40 MCG/ACT inhaler Inhale 80 mcg 2 (two) times a day      levothyroxine 100 mcg tablet Take 1 tablet (100 mcg total) by mouth daily 90 tablet 1     No current facility-administered medications on  file prior to visit.     Allergies   Allergen Reactions    Iv Contrast [Iodinated Contrast Media] Vomiting     Vomiting blood    Lamictal [Lamotrigine] Hives     rash    Latuda [Lurasidone] Hives     rash    Levaquin [Levofloxacin] Hives     rash    Dulaglutide Diarrhea    Benztropine Diarrhea, Other (See Comments) and Headache     Insomnia, muscle weakness acathisia worsening    Brexpiprazole Other (See Comments)     Feels like something is crawling under her skin when she sits she starts uncontrollable movement to rock back and off     Escitalopram Other (See Comments)    Food Rash    Lumateperone Anxiety, Irritability and Other (See Comments)      Current Outpatient Medications on File Prior to Visit   Medication Sig Dispense Refill    albuterol (Ventolin HFA) 90 mcg/act inhaler Inhale 2 puffs every 6 (six) hours as needed for wheezing 18 g 5    Amantadine HCl 100 MG tablet Take 100 mg by mouth 3 (three) times a day      clonazePAM (KlonoPIN) 1 mg tablet Take 1 mg by mouth 2 (two) times a day      ELDERBERRY PO Take by mouth      ergocalciferol (VITAMIN D2) 50,000 units       gabapentin (NEURONTIN) 300 mg capsule Take 300 mg by mouth 3 (three) times a day      Ingrezza 80 MG CAPS 80 mg      liothyronine (CYTOMEL) 25 mcg tablet Take 25 mcg by mouth daily      lithium carbonate (LITHOBID) 300 mg CR tablet TAKE 3 TABLETS BY ORAL ROUTE AT BEDTIME      methocarbamol (ROBAXIN) 750 mg tablet 750 mg      Ozempic, 0.25 or 0.5 MG/DOSE, 2 MG/3ML injection pen       pyridoxine (VITAMIN B6) 100 mg tablet Take 100 mg by mouth daily      SUMAtriptan (IMITREX) 50 mg tablet Take 50 mg by mouth daily as needed      traZODone (DESYREL) 50 mg tablet Take 50 mg by mouth daily at bedtime      Vraylar 1.5 MG capsule Take 1.5 mg by mouth daily      beclomethasone (QVAR REDIHALER) 40 MCG/ACT inhaler Inhale 80 mcg 2 (two) times a day      levothyroxine 100 mcg tablet Take 1 tablet (100 mcg total) by mouth daily 90 tablet 1     No current  "facility-administered medications on file prior to visit.      Social History     Tobacco Use    Smoking status: Never     Passive exposure: Never    Smokeless tobacco: Never   Vaping Use    Vaping status: Never Used   Substance and Sexual Activity    Alcohol use: Not Currently     Comment: social 1 cup a day wine    Drug use: Never    Sexual activity: Not Currently     Partners: Female, Male     Birth control/protection: Post-menopausal     Objective     /70   Pulse 70   Temp (!) 94 °F (34.4 °C)   Ht 5' 6\" (1.676 m)   Wt 103 kg (227 lb)   SpO2 98%   BMI 36.64 kg/m²     Physical Exam  Vitals reviewed.   Constitutional:       General: She is not in acute distress.     Appearance: She is well-developed. She is obese. She is not ill-appearing, toxic-appearing or diaphoretic.   HENT:      Head: Normocephalic and atraumatic.      Right Ear: Tympanic membrane, ear canal and external ear normal. There is no impacted cerumen.      Left Ear: Tympanic membrane, ear canal and external ear normal. There is no impacted cerumen.      Nose: Nose normal.   Eyes:      General: No scleral icterus.        Right eye: No discharge.         Left eye: No discharge.      Extraocular Movements: Extraocular movements intact.      Conjunctiva/sclera: Conjunctivae normal.   Neck:      Thyroid: No thyromegaly.      Vascular: No carotid bruit or JVD.      Trachea: No tracheal deviation.   Cardiovascular:      Rate and Rhythm: Normal rate and regular rhythm.      Heart sounds: Normal heart sounds. No murmur heard.     No friction rub. No gallop.   Pulmonary:      Effort: Pulmonary effort is normal. No respiratory distress.      Breath sounds: Normal breath sounds. No stridor. No wheezing or rales.   Chest:      Chest wall: No tenderness.   Musculoskeletal:         General: No tenderness or deformity. Normal range of motion.      Cervical back: Normal range of motion and neck supple.   Skin:     General: Skin is warm and dry.      " Coloration: Skin is not pale.      Findings: No erythema or rash.   Neurological:      Mental Status: She is alert and oriented to person, place, and time.      Cranial Nerves: No cranial nerve deficit.      Motor: No abnormal muscle tone.      Coordination: Coordination normal.   Psychiatric:         Behavior: Behavior normal.       Administrative Statements

## 2024-06-06 NOTE — ASSESSMENT & PLAN NOTE
"Sensation of fullness in both ears-no cerumen impaction noted on exam.  No fluid noted behind TM.   Has been doing vestibular PT without resolution.   Discussed results and plan with Dr. Lamb, we mutually agreed to follow with ENT for further eval and management    VNG results reviewed  \"Abnormal: Mixed results              Poor ocular motors and square jerks - central indications              Positive Hallpike's - BPPV\"      "

## 2024-06-06 NOTE — TELEPHONE ENCOUNTER
----- Message from Lakshmi CASEY sent at 6/6/2024 10:25 AM EDT -----  06/06/24 10:25 AM    Hello, our patient Katlyn Frye has had Pap Smear (HPV) aka Cervical Cancer Screening completed/performed. Please assist in updating the patient chart by pulling the Care Everywhere (CE) document. The date of service is 01/26/2024.     Thank you,  Lakshmi Mckinley   PRIMARY CARE [place]

## 2024-06-06 NOTE — TELEPHONE ENCOUNTER
Upon review of the In Basket request we were able to locate, review, and update the patient chart as requested for Pap Smear (HPV) aka Cervical Cancer Screening.    Any additional questions or concerns should be emailed to the Practice Liaisons via the appropriate education email address, please do not reply via In Basket.    Thank you  Bessy Martin   [facility]

## 2024-06-07 ENCOUNTER — OFFICE VISIT (OUTPATIENT)
Age: 64
End: 2024-06-07
Payer: COMMERCIAL

## 2024-06-07 DIAGNOSIS — H81.11 BPPV (BENIGN PAROXYSMAL POSITIONAL VERTIGO), RIGHT: Primary | ICD-10-CM

## 2024-06-07 DIAGNOSIS — G89.29 COVID-19 LONG HAULER MANIFESTING CHRONIC MUSCLE PAIN: ICD-10-CM

## 2024-06-07 DIAGNOSIS — M79.10 COVID-19 LONG HAULER MANIFESTING CHRONIC MUSCLE PAIN: ICD-10-CM

## 2024-06-07 DIAGNOSIS — G24.01 TARDIVE DYSKINESIA: ICD-10-CM

## 2024-06-07 DIAGNOSIS — U09.9 COVID-19 LONG HAULER MANIFESTING CHRONIC MUSCLE PAIN: ICD-10-CM

## 2024-06-07 PROCEDURE — 97530 THERAPEUTIC ACTIVITIES: CPT

## 2024-06-07 PROCEDURE — 97112 NEUROMUSCULAR REEDUCATION: CPT

## 2024-06-07 NOTE — PROGRESS NOTES
Daily Note     Today's date: 2024  Patient name: Katlyn Frye  : 1960  MRN: [id number]  Referring provider: Susie Lamb*  Dx:   Encounter Diagnosis     ICD-10-CM    1. BPPV (benign paroxysmal positional vertigo), right  H81.11       2. COVID-19 long hauler manifesting chronic muscle pain  M79.10     U09.9     G89.29       3. Tardive dyskinesia  G24.01                          Subjective: Pt had VNG and has reports printed and needs to review. Did not need walking sticks and is feeling better. Different results from last VNG in January with mixed results.      Objective: See treatment diary below    NMR  Roll test negative B/L pt symptomatic  Fallentimber Hallpike R: downbeating with L torsional 5s   Kyle Hallpike L: downbeating 15s torsional towards the R  Deep head hang: direction changing nystagmus, 10s horizontal > upbeating    Assessment:  Pt symptomatic with all positionals. Found to have downbeating and direction changing with assessments. Attempted deep head hang (3 min) with inconsistent findings. Significant amount of time spent completing patient education on central vs peripheral findings, normal findings for BPPV anterior vs posterior canals, and prognosis. Would highly recommend f/u with PCP for MRI/inner ear to rule out central findings. Would not recommend continued VRT until assessed further due to inconsistent findings. Pt aware and agreeable. Patient would benefit from continued PT      Plan: Continue per plan of care.      POC expires Unit limit Auth Expiration date PT/OT + Visit Limit?   24 BOMN pend N/a                           Visit/Unit Tracking  3/21 IE  RE  5/ 5   5

## 2024-06-11 ENCOUNTER — TELEPHONE (OUTPATIENT)
Age: 64
End: 2024-06-11

## 2024-06-11 NOTE — PROGRESS NOTES
"NEPHROLOGY OFFICE NOTE    Patient: Katlyn Frye               Sex: female           YOB: 1960        Age:  63 y.o.       6/14/2024      BACKGROUND     I had the pleasure of seeing Katlyn Frye in the nephrology office today for follow-up. She has a past medical history significant for type 2 diabetes, Bipolar disorder, hypothyroidism, and depression.      She has underlying Bipolar disorder and depression, managed on multiple medications including trazodone, clonazepam, and lithium.  Most recent lithium level was in normal limits at 0.7.  Reports that her psychiatrist is closely monitoring her medications.     She has underlying osteoarthritis and following with Orthopedics for management of knee pain.      She had undergone right nephrectomy in the past.  Reports that she underwent total abdominal hysterectomy in 2009 and there was an injury to her right kidney.  She reports that she was told that the kidney \"shriveled up\" and had to be removed.     She has underlying type 2 diabetes and is maintained on Ozempic.  Reports blood sugars are under good control at home.     Patient has underlying orthostatic hypotension, not actively monitoring blood pressures at home.  Reports she is concerned that some of her medications may be contributing to her episodes of low blood pressure.  She is on sedating medications such as Klonopin and trazodone, which may impact blood pressure.  Advise close follow-up with her psychologist who is managing these medications.    In the interim, she was evaluated by Hematology for neutrophilia and is undergoing work-up and monitoring.     She was diagnosed with COVID-19 long hauler manifesting with chronic muscle pain and has been working with physical therapy.     Most recent labs were obtained on 6/13/2024, which we have reviewed together.    SUBJECTIVE     She currently has no complaints at this time and is feeling well. Patient denies any chest pain, shortness of " breath, or swelling.    REVIEW OF SYSTEMS     Review of Systems   Constitutional:  Positive for activity change and fatigue. Negative for chills and fever.   HENT:  Negative for trouble swallowing.    Respiratory:  Negative for shortness of breath.    Cardiovascular:  Negative for leg swelling.   Gastrointestinal:  Negative for nausea and vomiting.   Genitourinary:  Negative for difficulty urinating, dysuria, frequency and hematuria.   Musculoskeletal:  Negative for back pain.   Skin:  Negative for pallor.   Neurological:  Negative for dizziness, syncope, weakness and light-headedness.   Psychiatric/Behavioral:  Negative for sleep disturbance. The patient is not nervous/anxious.        OBJECTIVE     Current Weight: Weight - Scale: 104 kg (229 lb 6.4 oz)  Vitals:    06/14/24 0918   BP: 100/70   Pulse: 75   Resp: 18   Temp: 97.8 °F (36.6 °C)   SpO2: 98%     Body mass index is 37.03 kg/m².    CURRENT MEDICATIONS       Current Outpatient Medications:     albuterol (Ventolin HFA) 90 mcg/act inhaler, Inhale 2 puffs every 6 (six) hours as needed for wheezing, Disp: 18 g, Rfl: 5    Amantadine HCl 100 MG tablet, Take 100 mg by mouth 3 (three) times a day, Disp: , Rfl:     clonazePAM (KlonoPIN) 1 mg tablet, Take 1 mg by mouth 2 (two) times a day, Disp: , Rfl:     ELDERBERRY PO, Take by mouth, Disp: , Rfl:     ergocalciferol (VITAMIN D2) 50,000 units, , Disp: , Rfl:     fluticasone (FLONASE) 50 mcg/act nasal spray, ONE SPRAY EACN NOSTRIL TWICE A DAY, Disp: , Rfl:     gabapentin (NEURONTIN) 300 mg capsule, Take 300 mg by mouth 3 (three) times a day, Disp: , Rfl:     Ingrezza 80 MG CAPS, 80 mg, Disp: , Rfl:     levothyroxine 100 mcg tablet, Take 1 tablet (100 mcg total) by mouth daily, Disp: 90 tablet, Rfl: 1    liothyronine (CYTOMEL) 25 mcg tablet, Take 25 mcg by mouth daily, Disp: , Rfl:     lithium carbonate (LITHOBID) 300 mg CR tablet, TAKE 3 TABLETS BY ORAL ROUTE AT BEDTIME, Disp: , Rfl:     methocarbamol (ROBAXIN) 750 mg  tablet, 750 mg, Disp: , Rfl:     Ozempic, 0.25 or 0.5 MG/DOSE, 2 MG/3ML injection pen, , Disp: , Rfl:     pyridoxine (VITAMIN B6) 100 mg tablet, Take 100 mg by mouth daily, Disp: , Rfl:     SUMAtriptan (IMITREX) 50 mg tablet, Take 50 mg by mouth daily as needed, Disp: , Rfl:     traZODone (DESYREL) 50 mg tablet, Take 50 mg by mouth daily at bedtime, Disp: , Rfl:     Vraylar 1.5 MG capsule, Take 1.5 mg by mouth daily, Disp: , Rfl:     PHYSICAL EXAMINATION     Physical Exam  Vitals reviewed.   Constitutional:       General: She is not in acute distress.  HENT:      Head: Normocephalic.      Mouth/Throat:      Lips: Pink.      Mouth: Mucous membranes are moist.   Eyes:      General: Lids are normal. No scleral icterus.  Cardiovascular:      Rate and Rhythm: Normal rate and regular rhythm.      Heart sounds: S1 normal and S2 normal.   Pulmonary:      Effort: Pulmonary effort is normal. No accessory muscle usage or respiratory distress.      Breath sounds: Normal breath sounds.   Abdominal:      General: There is no distension.      Tenderness: There is no abdominal tenderness.   Musculoskeletal:      Cervical back: Normal range of motion and neck supple. No tenderness.      Right lower leg: No edema.      Left lower leg: No edema.   Skin:     General: Skin is warm.      Coloration: Skin is not cyanotic or jaundiced.   Neurological:      General: No focal deficit present.      Mental Status: She is alert and oriented to person, place, and time.   Psychiatric:         Attention and Perception: Attention normal.         Speech: Speech normal.         Behavior: Behavior is cooperative.           LAB RESULTS     Results from last 7 days   Lab Units 06/13/24  0841   SODIUM mmol/L 139   POTASSIUM mmol/L 4.3   CHLORIDE mmol/L 106   CO2 mmol/L 27   BUN mg/dL 9   CREATININE mg/dL 0.88   EGFR ml/min/1.73sq m 69   CALCIUM mg/dL 9.4         RADIOLOGY RESULTS      CT abdomen and pelvis 3/26/2024:  FINDINGS:     ABDOMEN     LOWER  CHEST: No clinically significant abnormality in the visualized lower chest.     LIVER/BILIARY TREE: The liver is enlarged measuring up to 18 cm in craniocaudal dimension and demonstrates diffuse fatty infiltration. Subcentimeter hypoattenuating lesion(s), too small to characterize but statistically likely benign, which do not   require follow-up (ACR White Paper 2017). No suspicious mass. Normal hepatic contours. No biliary dilation.     GALLBLADDER: Cholelithiasis without findings of acute cholecystitis.     SPLEEN: Unremarkable.     PANCREAS: Unremarkable.     ADRENAL GLANDS: Unremarkable.     KIDNEYS/URETERS: Status post right nephrectomy. 3 mm nonobstructing left intrarenal calculus. No hydronephrosis.     STOMACH AND BOWEL: No evidence for bowel obstruction. Colonic diverticulosis without evidence for acute diverticulitis.     APPENDIX: No findings to suggest appendicitis.     ABDOMINOPELVIC CAVITY: No ascites. No pneumoperitoneum. No lymphadenopathy.     VESSELS: Unremarkable for patient's age.     PELVIS     REPRODUCTIVE ORGANS: Unremarkable for patient's age.     URINARY BLADDER: Unremarkable.     ABDOMINAL WALL/INGUINAL REGIONS: Unremarkable.     BONES: No acute fracture or suspicious osseous lesion. Degenerative disc disease at L5-S1.     IMPRESSION:     No acute intra-abdominal pathology within the limits of this unenhanced examination.     Colonic diverticulosis without evidence for acute diverticulitis.     3 mm nonobstructing left intrarenal calculus.     Hepatomegaly/hepatic steatosis.      ASSESSMENT/PLAN     Chronic kidney disease stage II:  Patient underwent right total nephrectomy 2009, most recent CT imaging in June 2013 showed no structural abnormality of the left kidney.      After review of the medical record, it appears baseline creatinine levels fluctuate around 0.8.  Creatinine level is 0.88 with an EGFR of 69.  Urinalysis showed no significant hematuria, urine microalbumin to creatinine  "ratio slightly elevated at 129.     Etiology of underlying CKD multifactorial and suspected back in [place] to reduced nephron mass following total nephrectomy and diabetic nephropathy with documented microalbuminuria.    Patient is clinically euvolemic advised to stay hydrated with up to 64 ounces of water daily and advised caution with use of nephrotoxic agents such as NSAIDs.     Microalbuminuria:  Suspect etiology may be secondary to diabetic nephropathy.  Most recent urine microalbumin to creatinine ratio slightly elevated at 129.  Patient's blood pressure is borderline to low with a current systolic blood pressure reading of 100 mmHg.     Hold on initiation of low-dose lisinopril at this time due to hypotension.  Patient will begin monitoring blood pressures as below.  Will repeat urinalysis and urine microalbumin to creatinine ratio, if worsening at follow-up with patient unable to tolerate ACE/ARB therapy will consider initiation of second line agent.    Hypotension:  Reports she was previously diagnosed with orthostatic hypotension.  Current systolic blood pressure reading 100 mmHg, reports she is not monitoring at home.  Advise she begin utilizing compression stockings and follow a more liberalized salt intake.    Advised patient to maintain blood pressure log at home and contact our office after 2-week of measurements to ensure of blood pressure stability.     Type 2 Diabetes:  Currently maintained on Ozempic. Most recent hemoglobin A1C was 6.1.  Discussed importance of good glycemic control in the setting of underlying proteinuria.    Recommend nephrology follow-up in 6 months.    SINA Guzmán  Nephrology  6/14/2024      Portions of the record may have been created with voice recognition software. Occasional wrong word or \"sound a like\" substitutions may have occurred due to the inherent limitations of voice recognition software. Read the chart carefully and recognize, using context, where " substitutions have occurred.

## 2024-06-12 NOTE — TELEPHONE ENCOUNTER
Patient called states that she uses LV lab in [facility] and their computer system was down today so she was not able to get the labs done today.  She will call them tomorrow am to see if they are able to draw them and go tomorrow if so.  
Patient calling to ask if her appt is Friday can she get her labs done tomorrow morning. Informed her we should have those results by Friday and she can get them done.   
Home

## 2024-06-13 ENCOUNTER — TELEPHONE (OUTPATIENT)
Dept: NEPHROLOGY | Facility: CLINIC | Age: 64
End: 2024-06-13

## 2024-06-13 ENCOUNTER — APPOINTMENT (OUTPATIENT)
Age: 64
End: 2024-06-13
Payer: COMMERCIAL

## 2024-06-13 DIAGNOSIS — E11.9 TYPE 2 DIABETES MELLITUS WITHOUT COMPLICATION, WITHOUT LONG-TERM CURRENT USE OF INSULIN (HCC): ICD-10-CM

## 2024-06-13 DIAGNOSIS — R80.9 MICROALBUMINURIA: ICD-10-CM

## 2024-06-13 LAB
ANION GAP SERPL CALCULATED.3IONS-SCNC: 6 MMOL/L (ref 4–13)
BACTERIA UR QL AUTO: ABNORMAL /HPF
BILIRUB UR QL STRIP: NEGATIVE
BUN SERPL-MCNC: 9 MG/DL (ref 5–25)
CALCIUM SERPL-MCNC: 9.4 MG/DL (ref 8.4–10.2)
CHLORIDE SERPL-SCNC: 106 MMOL/L (ref 96–108)
CLARITY UR: CLEAR
CO2 SERPL-SCNC: 27 MMOL/L (ref 21–32)
COLOR UR: ABNORMAL
CREAT SERPL-MCNC: 0.88 MG/DL (ref 0.6–1.3)
CREAT UR-MCNC: 52.5 MG/DL
GFR SERPL CREATININE-BSD FRML MDRD: 69 ML/MIN/1.73SQ M
GLUCOSE P FAST SERPL-MCNC: 159 MG/DL (ref 65–99)
GLUCOSE UR STRIP-MCNC: NEGATIVE MG/DL
HGB UR QL STRIP.AUTO: NEGATIVE
KETONES UR STRIP-MCNC: NEGATIVE MG/DL
LEUKOCYTE ESTERASE UR QL STRIP: ABNORMAL
MICROALBUMIN UR-MCNC: 67.7 MG/L
MICROALBUMIN/CREAT 24H UR: 129 MG/G CREATININE (ref 0–30)
NITRITE UR QL STRIP: NEGATIVE
NON-SQ EPI CELLS URNS QL MICRO: ABNORMAL /HPF
PH UR STRIP.AUTO: 7 [PH]
POTASSIUM SERPL-SCNC: 4.3 MMOL/L (ref 3.5–5.3)
PROT UR STRIP-MCNC: NEGATIVE MG/DL
RBC #/AREA URNS AUTO: ABNORMAL /HPF
SODIUM SERPL-SCNC: 139 MMOL/L (ref 135–147)
SP GR UR STRIP.AUTO: 1.01 (ref 1–1.03)
UROBILINOGEN UR STRIP-ACNC: <2 MG/DL
WBC #/AREA URNS AUTO: ABNORMAL /HPF

## 2024-06-13 PROCEDURE — 36415 COLL VENOUS BLD VENIPUNCTURE: CPT

## 2024-06-13 PROCEDURE — 80048 BASIC METABOLIC PNL TOTAL CA: CPT

## 2024-06-13 NOTE — TELEPHONE ENCOUNTER
Appointment was confirmed with patient and labs done @ [facility] on 6/13/2024. Noelle Velarde, .

## 2024-06-14 ENCOUNTER — OFFICE VISIT (OUTPATIENT)
Dept: NEPHROLOGY | Facility: CLINIC | Age: 64
End: 2024-06-14
Payer: COMMERCIAL

## 2024-06-14 VITALS
TEMPERATURE: 97.8 F | SYSTOLIC BLOOD PRESSURE: 100 MMHG | OXYGEN SATURATION: 98 % | HEIGHT: 66 IN | WEIGHT: 229.4 LBS | RESPIRATION RATE: 18 BRPM | DIASTOLIC BLOOD PRESSURE: 70 MMHG | HEART RATE: 75 BPM | BODY MASS INDEX: 36.87 KG/M2

## 2024-06-14 DIAGNOSIS — N18.2 STAGE 2 CHRONIC KIDNEY DISEASE: Primary | ICD-10-CM

## 2024-06-14 DIAGNOSIS — E11.29 TYPE 2 DIABETES MELLITUS WITH MICROALBUMINURIA, WITHOUT LONG-TERM CURRENT USE OF INSULIN (HCC): ICD-10-CM

## 2024-06-14 DIAGNOSIS — Z90.5 HISTORY OF NEPHRECTOMY: ICD-10-CM

## 2024-06-14 DIAGNOSIS — R80.9 TYPE 2 DIABETES MELLITUS WITH MICROALBUMINURIA, WITHOUT LONG-TERM CURRENT USE OF INSULIN (HCC): ICD-10-CM

## 2024-06-14 PROCEDURE — 99214 OFFICE O/P EST MOD 30 MIN: CPT

## 2024-06-14 RX ORDER — FLUTICASONE PROPIONATE 50 MCG
SPRAY, SUSPENSION (ML) NASAL
COMMUNITY
Start: 2024-06-10

## 2024-06-14 NOTE — PATIENT INSTRUCTIONS
Maximum protein intake of 100 grams daily.   Use compression stocking, more liberalized salt diet intake given orthostatic hypotension and lower blood pressure  Start monitoring your blood pressure at home. Contact our office with blood pressure log after 2 weeks. If blood pressure stable, we can consider starting low dose lisinopril.   Drink plenty of water, up to 60 ounces daily.     How to Take a Blood Pressure Reading   AMBULATORY CARE:   Blood pressure (BP)  is the force of blood pushing on the walls of your arteries. Your BP results are written as 2 numbers. The first, or top, number is called systolic BP. This is the pressure caused by your heart pushing blood out to your body. The second, or bottom, number is called diastolic BP. This is the pressure when your heart relaxes and fills back up with blood. Ask your healthcare provider what your BP should be. For most people, a good BP goal is less than 120/80.       Call your doctor if:   Your BP is higher or lower than you were told it should be.    You have questions or concerns about your condition or care.    Why you may need to take BP readings:  You may not have any signs or symptoms of high BP. You may need to take BP readings regularly to know how often your BP is high. High BP increases your risk for a stroke, heart attack, or kidney disease. You may need to take medicine to keep your BP at a normal level. Write down and keep a log of your BP readings. Your healthcare provider can use the results to see if your BP medicines are working.  How often to take BP readings:  Your healthcare provider may recommend that you take BP readings 2 times a day. Take your BP at the same times each day, such as the morning and evening.  How to take BP readings:  You can take BP readings at home with a digital BP monitor. Read the instructions that came with your BP monitor. The monitor comes with an adjustable cuff. Ask your healthcare provider if your cuff is the  correct size.  Do not eat, drink, smoke, or exercise for 30 minutes before you take BP readings.    Rest quietly for 5 minutes before you begin. Do not talk while you take BP readings.    Sit with your feet flat on the floor and your back against a chair.    Put your arm straight out and support it on a flat surface. Your arm should be at chest level. Do not move your arm while you take your BP readings.    Make sure all of the air is out of the cuff. Place the BP cuff against your bare skin about 1 inch (2.5 cm) above your elbow. Wrap the cuff snugly around your arm. The BP reading may not be correct if the cuff is too loose.    If you are using a wrist cuff, wrap the cuff snugly around your wrist. Hold your wrist at the same level as your heart.    Turn on the BP monitor and follow the directions.    Write down your BP, the date, the time, and which arm you used to take BP reading. Take 2 BP readings and write down both results. Use the same arm each time. These BP readings can be 1 minute apart.       What else you need to know:   Do not take a BP reading in an arm that is injured or has an IV or shunt.  The reading may not be accurate.    Do not stop taking your medicines if your BP is at your goal.  A BP at your goal means your medicine is working correctly. Take your BP medicines as directed.    Follow up with your doctor as directed:  Bring the log of your BP readings. Also bring the BP machine. Healthcare providers can check that you are using the machine correctly. Write down your questions so you remember to ask them during your visits.  © Copyright [employer] 2023 Information is for End User's use only and may not be sold, redistributed or otherwise used for commercial purposes.  The above information is an  only. It is not intended as medical advice for individual conditions or treatments. Talk to your doctor, nurse or pharmacist before following any medical regimen to see if it is safe and  effective for you.     Chronic Kidney Disease   AMBULATORY CARE:   Chronic kidney disease (CKD)  is the gradual and permanent loss of kidney function. Normally, the kidneys remove fluid, chemicals, and waste from your blood. These wastes are turned into urine by your kidneys. CKD may worsen over time and lead to kidney failure.       Common signs and symptoms include the following:   Changes in how often you need to urinate    Swelling in your arms, legs, or feet    Shortness of breath    Fatigue or weakness    Bad or bitter taste in your mouth    Nausea, vomiting, or loss of appetite    Call your local emergency number (911 in the [place]) if:   You have a seizure.    You have shortness of breath.    Seek care immediately if:   You are confused and very drowsy.       Call your doctor or nephrologist if:   You suddenly gain or lose more weight than your healthcare provider has told you is okay.    You have itchy skin or a rash.    You urinate more or less than you normally do.    You have blood in your urine.    You have nausea and are vomiting.    You have fatigue or muscle weakness.    You have hiccups that will not stop.    You have questions or concerns about your condition or care.    How CKD is diagnosed:  CKD has 5 stages. Your healthcare provider will use results from the following tests to find the stage of CKD you have:  Blood and urine tests  show how well your kidneys are working. They may also show the cause of your CKD.    Ultrasound, CT scan, or MRI  pictures may be used to check your kidneys. You may be given contrast liquid to help your kidneys show up better in the pictures. Tell the healthcare provider if you have ever had an allergic reaction to contrast liquid. Do not enter the MRI room with anything metal. Metal can cause serious injury. Tell the healthcare provider if you have any metal in or on your body.    A biopsy  is a procedure to remove a small piece of tissue from your kidney. It is done to  find the cause of your CKD.    Treatment  can help control signs and symptoms, and prevent a worse stage of CKD. Your care team may include specialists, such as a dietitian or a heart specialist. This depends on the stage of your CKD and if you have other health conditions to manage. Healthcare providers will work with you to create a plan based on your decisions for treatment. Your treatment plan may include any of the following:  Medicines  may be given to decrease your blood pressure and get rid of extra fluid. You may also receive medicine to manage health conditions that may occur with CKD, such as anemia, diabetes, and heart disease.    Dialysis  is a treatment to remove chemicals and waste from your blood when your kidneys can no longer do this.    Surgery  may be needed to create an arteriovenous fistula (AVF) in your arm or insert a catheter into your abdomen. This is done so you can receive dialysis.    A kidney transplant  may be done if your CKD becomes severe.    What you can do to manage CKD:  Management may include making some lifestyle changes. Tell your healthcare provider if you have any concerns about being able to make changes. He or she can help you find solutions, including working with specialists. Ask for help creating a plan to break large goals into smaller steps. Your plan may include any of the following:  Manage other health conditions.  Your healthcare provider will work with you to make a care plan that meets your needs. You will be checked regularly for heart disease or other conditions that can make CKD worse, such as diabetes. Your blood pressure will be closely monitored. You will also get a target blood pressure and help making a plan to reach your target. This may include taking your blood pressure at home.         Maintain a healthy weight.  Your weight and body mass index (BMI) will be checked regularly. BMI helps find if your weight is healthy for your height. Your healthcare  provider will use other tests to check your muscle and protein levels. Extra weight can strain your kidneys. A low weight or low muscle mass can make you feel more tired. You may have trouble doing your daily activities. Ask your provider what a healthy weight is for you. He or she can help you create a plan to lose or gain weight safely, if needed. The plan may include keeping a food diary. This is a list of foods and liquids you have each day. Your provider will use the diary to help you make changes, if needed. Changes are based on your health and any other conditions you have, such as diabetes.    Create an exercise plan.  Regular exercise can help you manage CKD, high blood pressure, and diabetes. Exercise also helps control weight. Your provider can help you create exercise goals and a plan to reach those goals. For example, your goal may be to exercise for 30 minutes in a day. Your plan can include breaking exercise into 10 minute sessions, 3 times during the day.         Create a healthy eating plan.  Your provider may tell you to eat food low in potassium, phosphorus, or protein. Your provider may also recommend vitamin or mineral supplements. Do not take any supplements without talking to your provider. A dietitian can help you plan meals if needed. Ask how much liquid to drink each day and which liquids are best for you.         Limit sodium (salt) as directed.  You may need to limit sodium to less than 2,300 milligrams (mg) each day. Ask your dietitian or healthcare provider how much sodium you can have each day. The amount depends on your stage of kidney disease. Table salt, canned foods, soups, salted snacks, and processed meats, like deli meats and sausage, are high in sodium. Your provider or a dietitian can show you how to read food labels for sodium.    Limit alcohol as directed.  Alcohol can cause fluid retention and can affect your kidneys. Ask how much alcohol is safe for you. A drink of alcohol  is 12 ounces of beer, 5 ounces of wine, or 1½ ounces of liquor.    Do not smoke.  Nicotine and other chemicals in cigarettes and cigars can cause kidney damage. Ask your provider for information if you currently smoke and need help to quit. E-cigarettes or smokeless tobacco still contain nicotine. Talk to your provider before you use these products.    Ask about over-the-counter medicines.  Medicines such as NSAIDs and laxatives may harm your kidneys. Some cough and cold medicines can raise your blood pressure. Always ask if a medicine is safe before you take it.    Ask about vaccines you may need.  CKD can increase your risk for infections such as pneumonia, influenza, and hepatitis. Vaccines lower your risk for infection. Your healthcare provider will tell you which vaccines you need and when to get them.    Follow up with your doctor or nephrologist as directed:  You will need to return for tests to monitor your kidney and nerve function, and your parathyroid hormone level. Your medicines may be changed, based on certain test results. Write down your questions so you remember to ask them during your visits.  © Copyright [employer] 2023 Information is for End User's use only and may not be sold, redistributed or otherwise used for commercial purposes.  The above information is an  only. It is not intended as medical advice for individual conditions or treatments. Talk to your doctor, nurse or pharmacist before following any medical regimen to see if it is safe and effective for you.

## 2024-06-14 NOTE — LETTER
"June 14, 2024     Susie Lamb, DO  [address] [facility] [address]    Patient: Katlyn Frye   YOB: 1960   Date of Visit: 6/14/2024       Dear Dr. Lamb:    Thank you for referring Katlyn Frye to me for evaluation. Below are my notes for this consultation.    If you have questions, please do not hesitate to call me. I look forward to following your patient along with you.         Sincerely,        SINA Guzmán        CC: No Recipients    SINA Guzmán  6/14/2024  9:53 AM  Sign when Signing Visit  NEPHROLOGY OFFICE NOTE    Patient: Katlyn Frye               Sex: female           YOB: 1960        Age:  63 y.o.       6/14/2024      BACKGROUND     I had the pleasure of seeing Katlyn Frye in the nephrology office today for follow-up. She has a past medical history significant for type 2 diabetes, Bipolar disorder, hypothyroidism, and depression.      She has underlying Bipolar disorder and depression, managed on multiple medications including trazodone, clonazepam, and lithium.  Most recent lithium level was in normal limits at 0.7.  Reports that her psychiatrist is closely monitoring her medications.     She has underlying osteoarthritis and following with Orthopedics for management of knee pain.      She had undergone right nephrectomy in the past.  Reports that she underwent total abdominal hysterectomy in 2009 and there was an injury to her right kidney.  She reports that she was told that the kidney \"shriveled up\" and had to be removed.     She has underlying type 2 diabetes and is maintained on Ozempic.  Reports blood sugars are under good control at home.     Patient has underlying orthostatic hypotension, not actively monitoring blood pressures at home.  Reports she is concerned that some of her medications may be contributing to her episodes of low blood pressure.  She is on sedating medications such as Klonopin " and trazodone, it can impact blood pressure.  Advise close follow-up with her psychologist who is managing these medications.    In the interim, she was evaluated by Hematology for neutrophilia and is undergoing work-up and monitoring.     She was diagnosed with COVID-19 long hauler manifesting with chronic muscle pain and has been working with physical therapy.     Most recent labs were obtained on 6/13/2024, which we have reviewed together.    SUBJECTIVE     She currently has no complaints at this time and is feeling well. Patient denies any chest pain, shortness of breath, or swelling.    REVIEW OF SYSTEMS     Review of Systems   Constitutional:  Positive for activity change and fatigue. Negative for chills and fever.   HENT:  Negative for trouble swallowing.    Respiratory:  Negative for shortness of breath.    Cardiovascular:  Negative for leg swelling.   Gastrointestinal:  Negative for nausea and vomiting.   Genitourinary:  Negative for difficulty urinating, dysuria, frequency and hematuria.   Musculoskeletal:  Negative for back pain.   Skin:  Negative for pallor.   Neurological:  Negative for dizziness, syncope, weakness and light-headedness.   Psychiatric/Behavioral:  Negative for sleep disturbance. The patient is not nervous/anxious.        OBJECTIVE     Current Weight: Weight - Scale: 104 kg (229 lb 6.4 oz)  Vitals:    06/14/24 0918   BP: 100/70   Pulse: 75   Resp: 18   Temp: 97.8 °F (36.6 °C)   SpO2: 98%     Body mass index is 37.03 kg/m².    CURRENT MEDICATIONS       Current Outpatient Medications:   •  albuterol (Ventolin HFA) 90 mcg/act inhaler, Inhale 2 puffs every 6 (six) hours as needed for wheezing, Disp: 18 g, Rfl: 5  •  Amantadine HCl 100 MG tablet, Take 100 mg by mouth 3 (three) times a day, Disp: , Rfl:   •  clonazePAM (KlonoPIN) 1 mg tablet, Take 1 mg by mouth 2 (two) times a day, Disp: , Rfl:   •  ELDERBERRY PO, Take by mouth, Disp: , Rfl:   •  ergocalciferol (VITAMIN D2) 50,000 units, ,  Disp: , Rfl:   •  fluticasone (FLONASE) 50 mcg/act nasal spray, ONE SPRAY EACN NOSTRIL TWICE A DAY, Disp: , Rfl:   •  gabapentin (NEURONTIN) 300 mg capsule, Take 300 mg by mouth 3 (three) times a day, Disp: , Rfl:   •  Ingrezza 80 MG CAPS, 80 mg, Disp: , Rfl:   •  levothyroxine 100 mcg tablet, Take 1 tablet (100 mcg total) by mouth daily, Disp: 90 tablet, Rfl: 1  •  liothyronine (CYTOMEL) 25 mcg tablet, Take 25 mcg by mouth daily, Disp: , Rfl:   •  lithium carbonate (LITHOBID) 300 mg CR tablet, TAKE 3 TABLETS BY ORAL ROUTE AT BEDTIME, Disp: , Rfl:   •  methocarbamol (ROBAXIN) 750 mg tablet, 750 mg, Disp: , Rfl:   •  Ozempic, 0.25 or 0.5 MG/DOSE, 2 MG/3ML injection pen, , Disp: , Rfl:   •  pyridoxine (VITAMIN B6) 100 mg tablet, Take 100 mg by mouth daily, Disp: , Rfl:   •  SUMAtriptan (IMITREX) 50 mg tablet, Take 50 mg by mouth daily as needed, Disp: , Rfl:   •  traZODone (DESYREL) 50 mg tablet, Take 50 mg by mouth daily at bedtime, Disp: , Rfl:   •  Vraylar 1.5 MG capsule, Take 1.5 mg by mouth daily, Disp: , Rfl:     PHYSICAL EXAMINATION     Physical Exam  Vitals reviewed.   Constitutional:       General: She is not in acute distress.  HENT:      Head: Normocephalic.      Mouth/Throat:      Lips: Pink.      Mouth: Mucous membranes are moist.   Eyes:      General: Lids are normal. No scleral icterus.  Cardiovascular:      Rate and Rhythm: Normal rate and regular rhythm.      Heart sounds: S1 normal and S2 normal.   Pulmonary:      Effort: Pulmonary effort is normal. No accessory muscle usage or respiratory distress.      Breath sounds: Normal breath sounds.   Abdominal:      General: There is no distension.      Tenderness: There is no abdominal tenderness.   Musculoskeletal:      Cervical back: Normal range of motion and neck supple. No tenderness.      Right lower leg: No edema.      Left lower leg: No edema.   Skin:     General: Skin is warm.      Coloration: Skin is not cyanotic or jaundiced.   Neurological:       General: No focal deficit present.      Mental Status: She is alert and oriented to person, place, and time.   Psychiatric:         Attention and Perception: Attention normal.         Speech: Speech normal.         Behavior: Behavior is cooperative.           LAB RESULTS     Results from last 7 days   Lab Units 06/13/24  0841   SODIUM mmol/L 139   POTASSIUM mmol/L 4.3   CHLORIDE mmol/L 106   CO2 mmol/L 27   BUN mg/dL 9   CREATININE mg/dL 0.88   EGFR ml/min/1.73sq m 69   CALCIUM mg/dL 9.4         RADIOLOGY RESULTS      CT abdomen and pelvis 3/26/2024:  FINDINGS:     ABDOMEN     LOWER CHEST: No clinically significant abnormality in the visualized lower chest.     LIVER/BILIARY TREE: The liver is enlarged measuring up to 18 cm in craniocaudal dimension and demonstrates diffuse fatty infiltration. Subcentimeter hypoattenuating lesion(s), too small to characterize but statistically likely benign, which do not   require follow-up (ACR White Paper 2017). No suspicious mass. Normal hepatic contours. No biliary dilation.     GALLBLADDER: Cholelithiasis without findings of acute cholecystitis.     SPLEEN: Unremarkable.     PANCREAS: Unremarkable.     ADRENAL GLANDS: Unremarkable.     KIDNEYS/URETERS: Status post right nephrectomy. 3 mm nonobstructing left intrarenal calculus. No hydronephrosis.     STOMACH AND BOWEL: No evidence for bowel obstruction. Colonic diverticulosis without evidence for acute diverticulitis.     APPENDIX: No findings to suggest appendicitis.     ABDOMINOPELVIC CAVITY: No ascites. No pneumoperitoneum. No lymphadenopathy.     VESSELS: Unremarkable for patient's age.     PELVIS     REPRODUCTIVE ORGANS: Unremarkable for patient's age.     URINARY BLADDER: Unremarkable.     ABDOMINAL WALL/INGUINAL REGIONS: Unremarkable.     BONES: No acute fracture or suspicious osseous lesion. Degenerative disc disease at L5-S1.     IMPRESSION:     No acute intra-abdominal pathology within the limits of this unenhanced  examination.     Colonic diverticulosis without evidence for acute diverticulitis.     3 mm nonobstructing left intrarenal calculus.     Hepatomegaly/hepatic steatosis.      ASSESSMENT/PLAN     Chronic kidney disease stage II:  Patient underwent right total nephrectomy 2009, most recent CT imaging in June 2013 showed no structural abnormality of the left kidney.      After review of the medical record, it appears baseline creatinine levels fluctuate around 0.8.  Creatinine level is 0.88 with an EGFR of 69.  Urinalysis showed no significant hematuria, urine microalbumin to creatinine ratio slightly elevated at 129.     Etiology of underlying CKD multifactorial and suspected back in [place] to reduced nephron mass following total nephrectomy and diabetic nephropathy with documented microalbuminuria.    Patient is clinically euvolemic advised to stay hydrated with up to 64 ounces of water daily and advised caution with use of nephrotoxic agents such as NSAIDs.     Microalbuminuria:  Suspect etiology may be secondary to diabetic nephropathy.  Most recent urine microalbumin to creatinine ratio slightly elevated at 129.  Patient's blood pressure is borderline to low with a current systolic blood pressure reading of 100 mmHg.     Hold on initiation of low-dose lisinopril at this time due to hypotension.  Patient will begin monitoring blood pressures as below.  Will repeat urinalysis and urine microalbumin to creatinine ratio, if worsening at follow-up with patient unable to tolerate ACE/ARB therapy will consider initiation of second line agent.    Hypotension:  Reports she was previously diagnosed with orthostatic hypotension.  Current systolic blood pressure reading 100 mmHg, reports she is not monitoring at home.  Advise she begin utilizing compression stockings and follow a more liberalized salt intake.    Advised patient to maintain blood pressure log at home and contact our office after 2-week of measurements to ensure  "of blood pressure stability.     Type 2 Diabetes:  Currently maintained on Ozempic. Most recent hemoglobin A1C was 6.1.  Discussed importance of good glycemic control in the setting of underlying proteinuria.    Recommend nephrology follow-up in 6 months.    SINA Guzmán  Nephrology  6/14/2024      Portions of the record may have been created with voice recognition software. Occasional wrong word or \"sound a like\" substitutions may have occurred due to the inherent limitations of voice recognition software. Read the chart carefully and recognize, using context, where substitutions have occurred.   "

## 2024-06-17 ENCOUNTER — TELEPHONE (OUTPATIENT)
Dept: NEUROLOGY | Facility: CLINIC | Age: 64
End: 2024-06-17

## 2024-06-17 NOTE — TELEPHONE ENCOUNTER
Patient will be offered re-scheduling from 6/18 to 6/19 in [place] office at 9 :30 am [place].     Provider is not available on 6/18/24 due to family emergency.     Please help accommodating the patient.

## 2024-06-19 ENCOUNTER — OFFICE VISIT (OUTPATIENT)
Dept: NEUROLOGY | Facility: CLINIC | Age: 64
End: 2024-06-19
Payer: COMMERCIAL

## 2024-06-19 VITALS
DIASTOLIC BLOOD PRESSURE: 76 MMHG | OXYGEN SATURATION: 96 % | BODY MASS INDEX: 36.34 KG/M2 | TEMPERATURE: 98.2 F | SYSTOLIC BLOOD PRESSURE: 101 MMHG | WEIGHT: 226.1 LBS | HEART RATE: 69 BPM | HEIGHT: 66 IN

## 2024-06-19 DIAGNOSIS — H81.13 BENIGN PAROXYSMAL POSITIONAL VERTIGO DUE TO BILATERAL VESTIBULAR DISORDER: ICD-10-CM

## 2024-06-19 DIAGNOSIS — G25.71 AKATHISIA: ICD-10-CM

## 2024-06-19 DIAGNOSIS — F31.4 BIPOLAR 1 DISORDER, DEPRESSED, SEVERE (HCC): ICD-10-CM

## 2024-06-19 DIAGNOSIS — H55.81 SACCADIC EYE MOVEMENT DEFICIENCY: Primary | ICD-10-CM

## 2024-06-19 DIAGNOSIS — H81.8X9 PERSISTENT POSTURAL-PERCEPTUAL DIZZINESS: ICD-10-CM

## 2024-06-19 DIAGNOSIS — G24.01 TARDIVE DYSKINESIA: ICD-10-CM

## 2024-06-19 DIAGNOSIS — E03.9 ACQUIRED HYPOTHYROIDISM: ICD-10-CM

## 2024-06-19 PROCEDURE — 99205 OFFICE O/P NEW HI 60 MIN: CPT | Performed by: PSYCHIATRY & NEUROLOGY

## 2024-06-19 NOTE — PROGRESS NOTES
"Review of Systems   Constitutional:  Negative for appetite change, fatigue and fever.   HENT: Negative.  Negative for hearing loss, tinnitus, trouble swallowing and voice change.    Eyes: Negative.  Negative for photophobia, pain and visual disturbance.   Respiratory: Negative.  Negative for shortness of breath.    Cardiovascular: Negative.  Negative for palpitations.   Gastrointestinal: Negative.  Negative for nausea and vomiting.   Endocrine: Negative.  Negative for cold intolerance.   Genitourinary: Negative.  Negative for dysuria, frequency and urgency.   Musculoskeletal:  Positive for gait problem (PT, walking sticks when out) and myalgias. Negative for back pain, neck pain and neck stiffness.   Skin: Negative.  Negative for rash.   Allergic/Immunologic: Negative.    Neurological:  Positive for dizziness (3 falls in 6w, recent. Denies headstrike or syncope) and tremors (\"mostly L hand and occasionally b/l feet). Negative for seizures, syncope, facial asymmetry, speech difficulty, weakness, light-headedness, numbness and headaches (3x/week).   Hematological: Negative.  Does not bruise/bleed easily.   Psychiatric/Behavioral: Negative.  Negative for confusion, hallucinations and sleep disturbance.    All other systems reviewed and are negative.     "

## 2024-06-19 NOTE — PROGRESS NOTES
Patient ID: Katlyn Frye is a 63 y.o. female.    Assessment/Plan:           Problem List Items Addressed This Visit          Endocrine    Acquired hypothyroidism       Nervous and Auditory    Tardive dyskinesia - Primary    Relevant Orders    [place]    Ceruloplasmin    Vitamin B1, whole blood    Vitamin B12/Folate, Serum Panel    MRI brain IAC wo and w contrast    Vitamin B6    Benign paroxysmal positional vertigo due to bilateral vestibular disorder    Relevant Orders    MRI brain IAC wo and w contrast    Ambulatory Referral to Occupational Therapy    Saccadic eye movement deficiency    Relevant Orders    [place]    Ceruloplasmin    Vitamin B1, whole blood    Vitamin B12/Folate, Serum Panel    MRI brain IAC wo and w contrast    Ambulatory Referral to Occupational Therapy    Akathisia    Relevant Orders    Ambulatory Referral to Occupational Therapy       Behavioral Health    Bipolar 1 disorder, depressed, severe (HCC)       Other    Persistent postural-perceptual dizziness    Relevant Orders    Ambulatory Referral to Occupational Therapy      Mrs. Frye is a 63-year-old female who presented to [facility] multiple sclerosis center for relation of multiple neurological complaint.    Patient has underlying mood disorder requiring use of multiple antipsychotic regimens with recent concern was brought for tardive dyskinesia back in September 2023.  Patient was described as having restlessness with akathisia with intermittent tremors in left upper extremity and right lower extremity been noted.  Patient required hospitalization in September 2023 where she had medical regimen adjustment.  Patient has been taking Abilify, Seroquel and brexpiprazole; patient developed diarrhea to benztropine so she is taking clonazepam with Ingrezza and amantadine.  Patient doing reasonably well with no oral mandibular dyskinesia appreciated on her exam today.    Patient developed another problem in late April 2024 with  bilateral BPPV diagnosis established by ENT team by doing a VNG study.  Patient has multiple sessions of vestibular therapy and she has improvement in her vertigo which last up to 3-4 seconds the patient still describes ambulatory dysfunction with concern for developing persistent postural-perceptual dizziness was brought during this visit.  Patient has abnormal pursuit and saccades during the brain just already and on my exam today.  Those findings could be multifactorial as well.     Patient was offered MRI brain/IAC w/wo , several serological test to rule out vitamin deficiencies/toxicities and proceed with vision therapy in [facility].    We discussed with the patient affect on brain neurotransmitters while using multiple regimens for her underlying mood disorder.  We may consider proceeding with a DaTscan in the light of resting tremor left hand and jerky wave movement in the eyes may be also seen with Parkinson disease versus antipsychotic regimens.  No new focal sensorimotor dysfunction appreciated on exam, no cogwheel rigidity, Romberg positive been noted.    Patient will be offered follow-up based on findings on imaging studies as well as serological workup.    Subjective:  Reason for Consult: R42 (ICD-10-CM) - Vertigo   Dx: Akathisia [G25.71 (ICD-10-CM)]     HPI    Mrs. Frye is a 63-year-old female who presented to [facility] multiple sclerosis center for evaluation of vertigo and abnormal VNG findings.  Patient was initially evaluated by seen PT and ENT VNG abnormal done 5/22/24 and her Symptoms started 6 weeks ago.  Patient describes she was in her bed when she turned over trying to get out of the bed she felt a room spinning sensation and could not stand up at all.  Event took place in late April 2024.  After 6 weeks of vestibular therapy patient has still 3-4 seconds of vertigo, but mostly disequilibrium requiring walking sticks for her ambulation to be more steady.  Patient also was hospitalized  in September 2023 where she acutely developed abnormal movement in her upper lower extremities with diagnosis had made at that time and started dyskinesia or akathisia.  Several regimen adjustments was advised at that time and patient was taking Abilify/Seroquel/brexpiprazole.  Patient also discontinued Adderall.  Patient continue clonazepam and lithium with trazodone.  Patient stated she has been using Ingrezza 1 month regimen with reasonable improvement in her akathisia noted.  Patient has difficulties using the text on her cell phone she need to use with recognition devices with no voice tremor identified.  Patient did have neurological evaluation at [facility] several years back with multiple tests been completed and nonrevealing, as per the patient    Patient had multiple serological test done during last 2 years, serologic workup was negative for ESR/CRP or other thyroid dysfunction.  Hematology evaluation for erythrocytosis took place in April 2024; comprehensive evaluation was completed by [facility] hematology and nonrevealing.  Elevation in hemoglobin/hematocrit/neutrophils believed to be due to sleep apnea.    Abnormal: Mixed results              Poor ocular motors and square jerks - central indications              Positive Hallpike's - BPPV    [place]Hallpike was positive on the right side but worse on the left side.  Patient described dizziness triggered by  positioning.  Normal caloric irrigation.    Patient has abnormal tracking, abnormal CircAid's and abnormal optokinetic.  Oculomotor battery including gaze was within normal range.  CTA head and neck was completed on 3/2023, indication was patient was off balance.  No acute intracranial pathology.  No significant stenosis of the cervical carotid or vertebral arteries.  No high-grade intracranial stenosis, large vessel occlusion or aneurysm.  ESR/CRP is within normal range.     The following portions of the patient's history were reviewed and updated as  appropriate: She  has a past medical history of Arthritis, Asthma (All my life; pretty mild these days), Bipolar 2 disorder ([facility]) (1990), Chronic kidney disease, Depression, Diabetes mellitus (HCC), Disease of thyroid gland (2009), GERD (gastroesophageal reflux disease), and Vertigo.  She   Patient Active Problem List    Diagnosis Date Noted    Persistent postural-perceptual dizziness 06/19/2024    Saccadic eye movement deficiency 06/19/2024    Akathisia 06/19/2024    Stage 2 chronic kidney disease 06/14/2024    Benign paroxysmal positional vertigo due to bilateral vestibular disorder 06/06/2024    Bipolar 1 disorder, depressed, severe ([facility]) 02/08/2024    Acquired hypothyroidism 02/08/2024    Tardive dyskinesia 02/08/2024    History of nephrectomy 12/13/2023    Type 2 diabetes mellitus with microalbuminuria, without long-term current use of insulin ([facility]) 08/07/2023     She  has a past surgical history that includes Sinus surgery (1987); Hysterectomy (2007); Kidney surgery (Right, 2009); Knee surgery (Right meniscus repair); Colonoscopy; Dilation and curettage of uterus; Electroconvulsive therapy; and Abdominal surgery (Oct 2007).  Her family history includes Arthritis in her mother; Asthma in her sister; Breast cancer in her cousin; COPD in her mother; Cancer in her father and maternal grandmother; Colon cancer in her father and maternal grandmother; Depression in her sister; Diabetes in her maternal grandfather; Heart attack in her brother; Heart disease in her brother and mother; Mental illness in her paternal grandfather and sister; Neuropathy in her father; Stroke in her mother.  She  reports that she has never smoked. She has never been exposed to tobacco smoke. She has never used smokeless tobacco. She reports current alcohol use. She reports that she does not use drugs.  Current Outpatient Medications   Medication Sig Dispense Refill    albuterol (Ventolin HFA) 90 mcg/act inhaler Inhale 2 puffs every 6 (six)  hours as needed for wheezing 18 g 5    Amantadine HCl 100 MG tablet Take 100 mg by mouth 3 (three) times a day      clonazePAM (KlonoPIN) 1 mg tablet Take 1 mg by mouth 2 (two) times a day      ELDERBERRY PO Take by mouth      ergocalciferol (VITAMIN D2) 50,000 units       fluticasone (FLONASE) 50 mcg/act nasal spray ONE SPRAY EACN NOSTRIL TWICE A DAY      gabapentin (NEURONTIN) 300 mg capsule Take 300 mg by mouth 3 (three) times a day      Ingrezza 80 MG CAPS 80 mg      levothyroxine 100 mcg tablet Take 1 tablet (100 mcg total) by mouth daily 90 tablet 1    liothyronine (CYTOMEL) 25 mcg tablet Take 25 mcg by mouth every other day      lithium carbonate (LITHOBID) 300 mg CR tablet TAKE 3 TABLETS BY ORAL ROUTE AT BEDTIME      Ozempic, 0.25 or 0.5 MG/DOSE, 2 MG/3ML injection pen       pyridoxine (VITAMIN B6) 100 mg tablet Take 100 mg by mouth daily      SUMAtriptan (IMITREX) 50 mg tablet Take 50 mg by mouth daily as needed      traZODone (DESYREL) 50 mg tablet Take 50 mg by mouth daily at bedtime      Vraylar 1.5 MG capsule Take 1.5 mg by mouth daily      methocarbamol (ROBAXIN) 750 mg tablet 750 mg (Patient not taking: Reported on 6/19/2024)       No current facility-administered medications for this visit.     Current Outpatient Medications on File Prior to Visit   Medication Sig    albuterol (Ventolin HFA) 90 mcg/act inhaler Inhale 2 puffs every 6 (six) hours as needed for wheezing    Amantadine HCl 100 MG tablet Take 100 mg by mouth 3 (three) times a day    clonazePAM (KlonoPIN) 1 mg tablet Take 1 mg by mouth 2 (two) times a day    ELDERBERRY PO Take by mouth    ergocalciferol (VITAMIN D2) 50,000 units     fluticasone (FLONASE) 50 mcg/act nasal spray ONE SPRAY EACN NOSTRIL TWICE A DAY    gabapentin (NEURONTIN) 300 mg capsule Take 300 mg by mouth 3 (three) times a day    Ingrezza 80 MG CAPS 80 mg    levothyroxine 100 mcg tablet Take 1 tablet (100 mcg total) by mouth daily    liothyronine (CYTOMEL) 25 mcg tablet  "Take 25 mcg by mouth every other day    lithium carbonate (LITHOBID) 300 mg CR tablet TAKE 3 TABLETS BY ORAL ROUTE AT BEDTIME    Ozempic, 0.25 or 0.5 MG/DOSE, 2 MG/3ML injection pen     pyridoxine (VITAMIN B6) 100 mg tablet Take 100 mg by mouth daily    SUMAtriptan (IMITREX) 50 mg tablet Take 50 mg by mouth daily as needed    traZODone (DESYREL) 50 mg tablet Take 50 mg by mouth daily at bedtime    Vraylar 1.5 MG capsule Take 1.5 mg by mouth daily    methocarbamol (ROBAXIN) 750 mg tablet 750 mg (Patient not taking: Reported on 6/19/2024)     No current facility-administered medications on file prior to visit.     She is allergic to iv contrast [iodinated contrast media], lamictal [lamotrigine], latuda [lurasidone], levaquin [levofloxacin], dulaglutide, benztropine, brexpiprazole, escitalopram, food, and lumateperone..         Objective:    Blood pressure 101/76, pulse 69, temperature 98.2 °F (36.8 °C), temperature source Temporal, height 5' 6\" (1.676 m), weight 103 kg (226 lb 1.6 oz), SpO2 96%.    Physical Exam    Neurological Exam  CONSTITUTIONAL: NAD, pleasant. NECK: supple, no lymphadenopathy, no thyromegaly, no JVD. CARDIOVASCULAR: RRR, normal S1S2, no murmurs, no rubs. RESP: clear to auscultation bilaterally, no wheezes/rhonchi/rales. ABDOMEN: soft, non tender, non distended. SKIN: no rash or skin lesions. EXTREMITIES: no edema, pulses 2+bilaterally. PSYCH: appropriate mood and affect  NEUROLOGIC COMPREHENSIVE EXAM: Patient is oriented to person, place and time, NAD; appropriate affect. CN II, III, IV, V, VI, VII,VIII,IX,X,XI-XII intact with EOMI, PERRLA, OKN intact, VF grossly intact, fundi poorly visualized secondary to pupillary constriction; symmetric face noted. Motor: 5/5 UE/LE bilateral symmetric; Sensory: intact to light touch and pinprick bilaterally; normal vibration sensation feet bilaterally; Coordination within abnormal limits on FTN and KAYLEN testing; DTR: 2/4 through, no Babinski, no clonus. Tandem " gait is intact. Romberg: present.  Resting tremor in the left upper extremity and right lower extremity;   No cogwheel rigidity.  ROS:    Review of Systems

## 2024-06-24 ENCOUNTER — EVALUATION (OUTPATIENT)
Age: 64
End: 2024-06-24
Payer: COMMERCIAL

## 2024-06-24 ENCOUNTER — APPOINTMENT (OUTPATIENT)
Age: 64
End: 2024-06-24
Payer: COMMERCIAL

## 2024-06-24 DIAGNOSIS — H55.81 SACCADIC EYE MOVEMENT DEFICIENCY: ICD-10-CM

## 2024-06-24 DIAGNOSIS — G25.71 AKATHISIA: ICD-10-CM

## 2024-06-24 DIAGNOSIS — H81.13 BENIGN PAROXYSMAL POSITIONAL VERTIGO DUE TO BILATERAL VESTIBULAR DISORDER: ICD-10-CM

## 2024-06-24 DIAGNOSIS — G24.01 TARDIVE DYSKINESIA: ICD-10-CM

## 2024-06-24 DIAGNOSIS — H81.8X9 PERSISTENT POSTURAL-PERCEPTUAL DIZZINESS: ICD-10-CM

## 2024-06-24 DIAGNOSIS — H55.81 SACCADIC EYE MOVEMENT DEFICIENCY: Primary | ICD-10-CM

## 2024-06-24 LAB
FOLATE SERPL-MCNC: 5.6 NG/ML
VIT B12 SERPL-MCNC: 1304 PG/ML (ref 180–914)

## 2024-06-24 PROCEDURE — 82525 ASSAY OF COPPER: CPT

## 2024-06-24 PROCEDURE — 84207 ASSAY OF VITAMIN B-6: CPT

## 2024-06-24 PROCEDURE — 36415 COLL VENOUS BLD VENIPUNCTURE: CPT

## 2024-06-24 PROCEDURE — 82607 VITAMIN B-12: CPT

## 2024-06-24 PROCEDURE — 82746 ASSAY OF FOLIC ACID SERUM: CPT

## 2024-06-24 PROCEDURE — 84425 ASSAY OF VITAMIN B-1: CPT

## 2024-06-24 PROCEDURE — 82390 ASSAY OF CERULOPLASMIN: CPT

## 2024-06-24 PROCEDURE — 97166 OT EVAL MOD COMPLEX 45 MIN: CPT

## 2024-06-24 PROCEDURE — 97530 THERAPEUTIC ACTIVITIES: CPT

## 2024-06-24 NOTE — PROGRESS NOTES
OCCUPATIONAL THERAPY INITIAL EVALUATION    POC expires Auth Status Total   Visits  Start date  Expiration date PT/OT + Visit Limit? Co-Insurance   24 BOMN BOMN 24  BOMN No                                           Visit/Unit Tracking  AUTH Status: BOMN Date  (IE)              Visits  Authed: BOMN Used 1               Remaining                  PLAN OF CARE START: 24  PLAN OF CARE END: 2024  PROGRESS NOTE DUE: follow up once scheduled out  FREQUENCY: 2x per week  PRECAUTIONS: fall risk, hx of diabetes  DIAGNOSIS: saccadic eye movement deficiency, BPPV  VISITS: 1      Today's date: 2024  Patient name: Katlyn Frye  : 1960  MRN: [id number]  Referring provider: Callie Amin, *  Dx:   Encounter Diagnosis     ICD-10-CM    1. 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 Benign paroxysmal positional vertigo due to bilateral vestibular disorder  H81.13           SKILLED ANALYSIS:  Pt is a 63 year old female presenting to OP OT s/p saccadic eye movement deficiency and BPPV. Pt is independent with ADLs and IADLs. Pt has been suffering from vertigo symptoms for the last 6-7 weeks. Pt states that she has been experiencing symptoms everyday over this time frame. Pt recently underwent VNG that came back abnormal. Pt reports that she suffers from tardive dyskinesia with frequent tremors and rapid movements. Pt currently reports difficulty with reading w/o getting eye strain/fatigue, typing, using a keyboard, handwriting, and tolerance for standing tasks. Pt is currently on hold to continue to receive neuro PT until she gets an MRI completed. Pt also currently receives ortho PT for b/l arthritis in pts knees. Pt is demonstrating deficits based on clinical observation and the following assessments: vision screen, CISS, anxiety and depression short forms, and Gerald Devick test. Post assessments pt demonstrating the following: reduced oculomotor functioning, specifically saccadic eye  "movements and sustained attention. Recommend OP OT 2x/wk for the next 12 weeks with focus on aforementioned deficits to maximize functional performance and improve QOL. Findings and recommendations discussed with pt, and they are in agreement. Educated pt on charges of insurance, assessments performed with standardized norms, POC, goal creation, and OP OT services.     Subjective  Occupational Profile  *type of home: two level with ~4 EH, has had occasional vertigo episodes on the stairs  *lives with: lives by herself  *vocation: retired   *driving: yes, pt uses [employer] services when symptoms are present  *DME: none  *Assistive device for inside home: none  *Assistive device for outside home: walking sticks as needed  *AE for ADLs: none  *ADL status: independent  *IADL status (cooking, cleaning, community mobility, medication management, finances): assistance with cleaning, independent with everything else  *other: arthritic knees     PATIENT GOAL: \"To feel less dizzy\" and \"to be able to read without fatigue\"    PAIN: b/l knees; restin-3/10, going up the stairs: 7-8/10    HISTORY OF PRESENT ILLNESS:   Pt is a 63 y.o. female who was referred to Occupational Therapy s/p Saccadic eye movement deficiency [H55.81].     As pre MD: \"Mrs. Frye is a 63-year-old female who presented to [facility] for evaluation of vertigo and abnormal VNG findings.  Patient was initially evaluated by seen PT and ENT VNG abnormal done 24 and her Symptoms started 6 weeks ago.  Patient describes she was in her bed when she turned over trying to get out of the bed she felt a room spinning sensation and could not stand up at all. Event took place in late 2024.  After 6 weeks of vestibular therapy patient has still 3-4 seconds of vertigo, but mostly disequilibrium requiring walking sticks for her ambulation to be more steady.  Patient also was hospitalized in 2023 where she acutely " "developed abnormal movement in her upper lower extremities with diagnosis had made at that time and started dyskinesia or akathisia. Several regimen adjustments was advised at that time and patient was taking Abilify/Seroquel/brexpiprazole. Patient also discontinued Adderall. Patient continue clonazepam and lithium with trazodone.  Patient stated she has been using Ingrezza 1 month regimen with reasonable improvement in her akathisia noted.  Patient has difficulties using the text on her cell phone she need to use with recognition devices with no voice tremor identified.  Patient did have neurological evaluation at [facility] several years back with multiple tests been completed and nonrevealing, as per the patient.\"      PMH:   Past Medical History:   Diagnosis Date    Arthritis     Bilateral knee    Asthma All my life; pretty mild these days    Bipolar 2 disorder (HCC) 1990    Chronic kidney disease     Depression     Diabetes mellitus (HCC)     Disease of thyroid gland 2009    GERD (gastroesophageal reflux disease)     Vertigo     05//2024       Past Surgical Hx:   Past Surgical History:   Procedure Laterality Date    ABDOMINAL SURGERY  Oct 2007    COLONOSCOPY      DILATION AND CURETTAGE OF UTERUS      ELECTROCONVULSIVE THERAPY      HYSTERECTOMY  2007    KIDNEY SURGERY Right 2009    KNEE SURGERY  Right meniscus repair    SINUS SURGERY  1987          Objective    Impairment Observations:         Depression Short Form: 8       Anxiety Short Form: 15    Convergence Insufficiency Symptom Survey  Total Score: 32  22 is indicative of convergence insufficiency     Gerald Devick Test  Test 1: 26 seconds w/ 0 errors  Test 2: 33 seconds w/ 0 errors  Test 3: 35 seconds w/ 0 errors     Status on IE:  impaired Comments                                        VISION SCREEN         GLASSES (readers)  Also has glasses for distance          Symptoms Include nausea, headache, and eye strain  only during vertigo episodes    Last Eye Exam ~2 " months ago           Visual Acuity (near) R eye  20/70     L eye 20/50    Binocularity (near) esotropia and esophoria    Binocularity (far) esotropia and esophoria    Convergence  Diplopia reported at 7.5 inches    Accommodation Blurriness/loss of focus reported at 9 inches    Bean String             Alignment  X and alignment    Mobility             Pursuits smooth in all planes HA reported at 4/10           Saccades jerky in all planes Overshooting/undershooting noted           Range of Motion reduced    Confrontation testing  WFL    Visual perceptual midline             Midline 1/2-1 inch off in all planes            Horizon  1/2-1 inch off in all planes        SHORT TERM GOALS (4-6 weeks)    GOAL  STATUS ON IE  GOAL STATUS    Pt will increase oculomotor control for improved saccades, pursuits, con/divergent tasks for improved reading, board to table tasks x 50% accuracy with minimal increase in symptoms.     Pt will increase oculomotor control for improved dynamic activities with head turns, board/screen to table tasks symptom free with 50% accuracy for improved life roles.     Pt will improve oculomotor coordination with and without vestibular integration to 50% as evidenced by saccades and pursuits screen to improve environmental navigation with ADLs/IADLs.     Pt will improve near point convergence from 7.5 inches to WFL to improve participation in near tasks within daily routines.  7.5 inches    LONG TERM GOALS (8-12 weeks)    GOAL  STATUS ON IE  GOAL STATUS    Pt will increase oculomotor control for improved saccades, pursuits, con/divergent tasks for improved reading, board to table tasks x 75% accuracy with minimal increase in symptoms.     Pt will increase oculomotor control for improved dynamic activities with head turns, board/screen to table tasks symptom free with 75% accuracy for improved life roles.      Pt will demo with G carryover of compensatory and visual search strategies for visual  tracking/gazing in b/l peripheral visual fields to improve engagement and functional performance in salient tasks.     Pt will improve oculomotor coordination with and without vestibular integration to 75% as evidenced by saccades and pursuits screen to improve environmental navigation with ADLs/IADLs.     Independent with vision HEP.     Pt will score 22/60 on CISS in order to increase independence with life roles. 32        OTHER PLANNED THERAPY INTERVENTIONS:   Supine, seated, and in stance neuro re-ed  Timed Trials  Manual tx  Hand to target  Multimatrix for saccades/ visual clutter/attention  Multi-modal environment  Sustained/alternating/divided attention  Tracking tube  Oculomotor control:  saccades, con/divergence  Conv./div. Dynamic tasks  Work stations with timed transitions  Edu on cog/vision apps

## 2024-06-26 ENCOUNTER — TELEPHONE (OUTPATIENT)
Age: 64
End: 2024-06-26

## 2024-06-26 DIAGNOSIS — G24.01 TARDIVE DYSKINESIA: Primary | ICD-10-CM

## 2024-06-26 LAB — CERULOPLASMIN SERPL-MCNC: 22 MG/DL (ref 19–39)

## 2024-06-26 RX ORDER — LANOLIN ALCOHOL/MO/W.PET/CERES
400 CREAM (GRAM) TOPICAL DAILY
Qty: 90 TABLET | Refills: 1 | Status: SHIPPED | OUTPATIENT
Start: 2024-06-26

## 2024-06-26 NOTE — TELEPHONE ENCOUNTER
----- Message from Callie Amin MD sent at 6/25/2024  5:03 PM EDT -----  Please review abnormal elevated B12 and low folate- patient is to stop B12 and start folate supplements

## 2024-06-27 ENCOUNTER — OFFICE VISIT (OUTPATIENT)
Age: 64
End: 2024-06-27
Payer: COMMERCIAL

## 2024-06-27 DIAGNOSIS — H55.81 SACCADIC EYE MOVEMENT DEFICIENCY: Primary | ICD-10-CM

## 2024-06-27 LAB — COPPER SERPL-MCNC: 104 UG/DL (ref 80–158)

## 2024-06-27 PROCEDURE — 97112 NEUROMUSCULAR REEDUCATION: CPT

## 2024-06-27 NOTE — TELEPHONE ENCOUNTER
Spoke with pt and advised her of Dr. LY's review of results and recommendations. Pt agreeable to stop taking B12 supplement and to add Folate as advised. Nothing further at this time.

## 2024-06-27 NOTE — PROGRESS NOTES
POC expires Auth Status Total   Visits  Start date  Expiration date PT/OT + Visit Limit? Co-Insurance   24 BOMN BOMN 24   BOMN No                                                                         Visit/Unit Tracking  AUTH Status: BOMN Date  (IE)      Visits  Authed: BOMN Used 1  1      Remaining            PLAN OF CARE START: 24  PLAN OF CARE END: 2024  PROGRESS NOTE DUE: follow up once scheduled out  FREQUENCY: 2x per week  PRECAUTIONS: fall risk, hx of diabetes  DIAGNOSIS: saccadic eye movement deficiency, BPPV  VISITS: 2      Daily Note     Today's date: 2024  Patient name: Katlyn Frye  : 1960  MRN: [id number]  Referring provider: Susie Lamb*  Dx:   Encounter Diagnosis     ICD-10-CM    1. Saccadic eye movement deficiency  H55.81           Start Time: 0802  Stop Time: 0845  Total time in clinic (min): 43 minutes    Subjective: Pt reports no new changes since last session.       Objective: See treatment diary below.     NMRE:  *Educated pt on and completed highlighter push ups for HEP to address saccades. Practiced in horizontal, vertical, and diagonal planes 1x10. Pt reports 6/10 eye fatigue and demo decreased endurance. Pt demo G carryover to complete 1x daily for HEP.     *Pt completed simple Qbitz in stance at table. Design card placed ~6 ft away for inc accomodation. Emphasis also on saccades. Upgraded to completing Qbitz extreme. Pt needing multiple rest breaks 2* to fatigue, ultimately having to sit down to complete final design. Pt benefiting from inc time and chunking strategies to complete complex designs.       Assessment: Tolerated treatment well. Focus of session of integration of vision HEP and saccades/accomodation. Pt benefiting from inc time and rest breaks to complete all activities 2* saccadic deficiency. Patient would benefit from continued OT to address oculomotor control for improved saccades, pursuits, con/divergent tasks for  improved reading, board to table tasks, dynamic activities with head turns, board/screen to table tasks, and independence with vision HEP.       Plan: Continue per plan of care.

## 2024-06-28 ENCOUNTER — TELEPHONE (OUTPATIENT)
Age: 64
End: 2024-06-28

## 2024-06-28 LAB — VIT B6 SERPL-MCNC: 51.4 UG/L (ref 3.4–65.2)

## 2024-06-28 NOTE — TELEPHONE ENCOUNTER
Patients BP readings since her last appointment are as follows:    Weekly Blood Pressure Results   Date  6/15 6/16 6/17 6/18 6/19 6/20 6/21   Time                 Blood Pressure 110/65  118/70 111/80 109/75 100/78 127/76 100/87   Pulse           Position of patient  Sitting  Sitting Sitting   Sitting Sitting  Sitting  Sitting    Additional Information                   Weekly Blood Pressure Results   Date 6/22 6/23 6/24 6/25 6/26 6/27 6/28   Time                 Blood Pressure 122/87  111/76 115/78 119/76 103/82 106/78 106/72   Pulse             Position of patient Sitting  Sitting   Sitting Sitting   Sitting  Sitting  Sitting   Additional Information

## 2024-06-28 NOTE — TELEPHONE ENCOUNTER
Called left voice message for patient advised as per SINA Moore. that she has personally reviewed the blood pressure log and blood pressure appears slightly improved since her last office visit with Britney. given that she only has a small amount of protein in her urine at this time, britney will like to wait to start her on any new  medication that can affect her blood pressure so she will like her to routinely check her blood pressure at home (at least 2 times a week or anytime she experiences dizziness/lightheadedness).  Prior to her follow-up appointment, advised to check her blood pressure daily for 1 week and bring that log with her to the appointment. per britney  will recheck her kidney numbers and urine studies prior to that visit and discuss if we need to make any medication changes at that time.

## 2024-06-29 LAB — VIT B1 BLD-SCNC: 98.7 NMOL/L (ref 66.5–200)

## 2024-07-01 ENCOUNTER — TELEPHONE (OUTPATIENT)
Age: 64
End: 2024-07-01

## 2024-07-01 NOTE — TELEPHONE ENCOUNTER
Appointment note:  Patient had concerns about contrast. States she vomits blood with contrast. Told patient to speak with physician and see if they would like to premedicate patient or do without contrast. Informed patient if they want the contrast, she would have to be done at the hospital. SC 13:46 7/1/24    -------------------------------------------------------    Incoming call received from Patient, she had made her MRI appointment for 08/02/2024 but was made aware to reach out to her provider (see above message.)     Patient stated she had IV contrast once and she threw up blood violently. Patient called to make us aware and advise.     Dr. Callie Amin: please advise.

## 2024-07-02 NOTE — TELEPHONE ENCOUNTER
That does not really sound like an allergy, so not sure premedication would be helpful (prednisone/benadryl).  Will leave for Dr. LY to address when she returns, as MRI is not until August

## 2024-07-02 NOTE — TELEPHONE ENCOUNTER
Outbound call to patient, no answer. LMOM, okay per communication form. Provided the message below and provided office number if patient need to call back with any questions.      Will forward message to Dr. LY to review next week.

## 2024-07-03 ENCOUNTER — OFFICE VISIT (OUTPATIENT)
Age: 64
End: 2024-07-03
Payer: COMMERCIAL

## 2024-07-03 DIAGNOSIS — H55.81 SACCADIC EYE MOVEMENT DEFICIENCY: Primary | ICD-10-CM

## 2024-07-03 PROCEDURE — 97112 NEUROMUSCULAR REEDUCATION: CPT

## 2024-07-03 NOTE — PROGRESS NOTES
POC expires Auth Status Total   Visits  Start date  Expiration date PT/OT + Visit Limit? Co-Insurance   24 BOMN BOMN 24   BOMN No                                                                         Visit/Unit Tracking  AUTH Status: BOMN Date  (IE) 6/27  7/3    Visits  Authed: BOMN Used 1  1 1      Remaining             PLAN OF CARE START: 24  PLAN OF CARE END: 2024  PROGRESS NOTE DUE: follow up once scheduled out  FREQUENCY: 2x per week  PRECAUTIONS: fall risk, hx of diabetes  DIAGNOSIS: saccadic eye movement deficiency, BPPV  VISITS: 3      Daily Note     Today's date: 7/3/2024  Patient name: Katlyn Frye  : 1960  MRN: [id number]  Referring provider: Susie Lamb*  Dx:   Encounter Diagnosis     ICD-10-CM    1. 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       Objective: See treatment diary below.       NMRE:  *Completed 3 minute intervals of coleman string walks and jumps with emphasis on improved convergence/divergence. Completed at 7.5 inches. Pt reports 3/10 HA following exercise.     *Educated pt on and completing [place] Chart for HEP to address saccades. Practiced in horizontal, vertical, and diagonal planes 1x10. Pt reports 6/10 eye fatigue. Pt demo G carryover to complete 1x daily for HEP.     *In stance at window, pt completing finding jumbled words in a paragraph, emphasis on smooth pursuits for scanning and saccades for looking back at word bank. Pt able to remain in stance for ~10 minutes with no LOB noted. Completed the remainder seated tabletop. Able to complete with 100% accuracy with inc time needed 2* decreased saccades.     *Seated tabletop, pt completing  puzzle replication from visual pattern card. Pt able to complete with simple independence, with no eye strain or  WILHELM noted. Pt encouraged to maintain light conversation with therapist for inc dual tasking in multi modal environment.       Assessment: Tolerated treatment well. Focus of session of integration of vision HEP and saccades/accomodation/pursuits. Pt benefiting from inc time and rest breaks to complete all activities 2* saccadic deficiency. Patient would benefit from continued OT to address oculomotor control for improved saccades, pursuits, con/divergent tasks for improved reading, board to table tasks, dynamic activities with head turns, board/screen to table tasks, and independence with vision HEP.       Plan: Continue per plan of care.

## 2024-07-05 NOTE — TELEPHONE ENCOUNTER
"Patient calling for recommendation regarding MRI questions.  I let her know response from Dr. LY will be reviewed when she is in office. Also discussed appointment time; was scheduled for 7/18 in the hospital and then moved to 8/2 in free standing facility (?).  I called central scheduling to change appointment; [facility] at 6 AM due to potential reaction, MRI  needs to be scheduled in hospital vs free standing center, patient line disconnected so called her back reached , left detailed message with updated appointment information)    Dr. LY  Clarified symptoms:  patient reporting last time she had an MRI with contrast, approx 15 years ago at [facility], she had severe nausea after IV contrast dye was infused within a few minutes, \"I was pulled out of the machine and was violently vomiting, off white in color with \"clumps of bright red and streaks\".    Dr. LY, please provide recommendation if pre med needed  "

## 2024-07-08 DIAGNOSIS — G24.01 TARDIVE DYSKINESIA: Primary | ICD-10-CM

## 2024-07-08 DIAGNOSIS — G25.71 AKATHISIA: ICD-10-CM

## 2024-07-08 DIAGNOSIS — H55.81 SACCADIC EYE MOVEMENT DEFICIENCY: ICD-10-CM

## 2024-07-10 ENCOUNTER — OFFICE VISIT (OUTPATIENT)
Age: 64
End: 2024-07-10
Payer: COMMERCIAL

## 2024-07-10 DIAGNOSIS — H55.81 SACCADIC EYE MOVEMENT DEFICIENCY: Primary | ICD-10-CM

## 2024-07-10 PROCEDURE — 97112 NEUROMUSCULAR REEDUCATION: CPT

## 2024-07-10 NOTE — PROGRESS NOTES
POC expires Auth Status Total   Visits  Start date  Expiration date PT/OT + Visit Limit? Co-Insurance   24 BOMN BOMN 24   BOMN No                                                                         Visit/Unit Tracking  AUTH Status: BOMN Date  (IE) 6/27  7/3 7/10   Visits  Authed: BOMN Used 1  1 1 1     Remaining             PLAN OF CARE START: 24  PLAN OF CARE END: 2024  PROGRESS NOTE DUE: follow up once scheduled out  FREQUENCY: 2x per week  PRECAUTIONS: fall risk, hx of diabetes  DIAGNOSIS: saccadic eye movement deficiency, BPPV  VISITS: 4      Daily Note     Today's date: 7/10/2024  Patient name: Katlyn Frye  : 1960  MRN: [id number]  Referring provider: Susie Lamb*  Dx:   Encounter Diagnosis     ICD-10-CM    1. 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 Pt stating that her tremors have been bad lately.       Objective: See treatment diary below.       NMRE:  *Educated pt on and completed lazy 8s for HEP to address pursuits. Pt demo G carryover to complete 1x daily for HEP. Slight tremors in R hand noted. No eye blurriness or fatigue noted.     *Completed 3 minute intervals of coleman string walks and jumps with emphasis on improved convergence/divergence. Completed at 6.5 inches. Pt reports 3/10 eye blurriness following exercise.     *Completed line tangles in stance at window for increased glare. Emphasis on endurance for smooth pursuits. Pt able to remain in stance for ~5 minutes with no LOB. Completed with 100% accuracy with inc time needed 2* decreased pursuits.     *Completed 120 number board in stance with pieces placed all around pt (tabletop, side tables, stools behind pt). Pt instructed to place pieces into board into sequential order. Activity focused on visual scanning, saccades, tolerance  for dynamic activities with head turns. Able to stand for ~15 minutes to complete, able to complete 50% of activity with G accuracy.   - HA mid way through activity: 4/10 in L eye  - HA at the end of activity: 4/10 in L eye    Assessment: Tolerated treatment well. Focus of session of integration of vision HEP and saccades, pursuits, and dynamic activities with head turns. Pt benefiting from inc time and rest breaks to complete all activities 2* saccadic deficiency. Tremors noted on R side 2* tardive dyskinesia. Patient would benefit from continued OT to address oculomotor control for improved saccades, pursuits, con/divergent tasks for improved reading, board to table tasks, dynamic activities with head turns, board/screen to table tasks, and independence with vision HEP.       Plan: Continue per plan of care.

## 2024-07-12 ENCOUNTER — OFFICE VISIT (OUTPATIENT)
Age: 64
End: 2024-07-12
Payer: COMMERCIAL

## 2024-07-12 DIAGNOSIS — H55.81 SACCADIC EYE MOVEMENT DEFICIENCY: Primary | ICD-10-CM

## 2024-07-12 DIAGNOSIS — H81.13 BENIGN PAROXYSMAL POSITIONAL VERTIGO DUE TO BILATERAL VESTIBULAR DISORDER: ICD-10-CM

## 2024-07-12 PROCEDURE — 97112 NEUROMUSCULAR REEDUCATION: CPT

## 2024-07-12 NOTE — PROGRESS NOTES
POC expires Auth Status Total   Visits  Start date  Expiration date PT/OT + Visit Limit? Co-Insurance   24 BOMN BOMN 24   BOMN No                                                                         Visit/Unit Tracking  AUTH Status: BOMN Date  (IE) 6/27  7/3 7/10 7/12   Visits  Authed: BOMN Used 1  1 1 1      Remaining              PLAN OF CARE START: 24  PLAN OF CARE END: 2024  PROGRESS NOTE DUE: 7/15/24 (full re-eval for auth)   FREQUENCY: 2x per week  PRECAUTIONS: fall risk, hx of diabetes  DIAGNOSIS: saccadic eye movement deficiency, BPPV  VISITS: 5      Daily Note     Today's date: 2024  Patient name: Katlyn Frye  : 1960  MRN: [id number]  Referring provider: Susie Lamb*  Dx:   Encounter Diagnosis     ICD-10-CM    1. 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 Benign paroxysmal positional vertigo due to bilateral vestibular disorder  H81.13                      Subjective: Pt reports no changes since last visit      Objective: See treatment diary below.   NMRE:  *BITS:   All activities focused on visual motor integration, visual scanning, reaction time, ocular motor tracking/control, saccades, hand eye coordination, cognitive integrations. Performed standing  Letter Charts: puzzle, prompt persistence, 8 total; 62% accuracy, reports decreased scanning on far R side; 5 hits total out of 8   Visual pursuits: Smooth pursuits-time paced, presented [place] for 5 seconds with high frequency central flashing fixation point; 87% accuracy with 122 taps  Visual pursuits: Rotator-tapping letters A>Z in order with speed 7 rotator and high frequency central flashing fixation point; 60% accuracy, 23 hits out of 43 trials   Visual scanning: complex array-tapping numbers 1>30 in ascending order with high frequency central flashing fixation point. 100% accuracy no difficulty noted     *Completed 2 minute intervals of coleman string walks and jumps with emphasis on  improved convergence/divergence. Completed at 6 inches. 2 rounds total.      Assessment: Tolerated treatment well. Reports eye fatigue and strain post session. Focus of session on visual functions.  Patient would benefit from continued OT to address oculomotor control for improved saccades, pursuits, con/divergent tasks for improved reading, board to table tasks, dynamic activities with head turns, board/screen to table tasks, and independence with vision HEP.       Plan: Continue per plan of care.

## 2024-07-15 ENCOUNTER — EVALUATION (OUTPATIENT)
Age: 64
End: 2024-07-15
Payer: COMMERCIAL

## 2024-07-15 DIAGNOSIS — H55.81 SACCADIC EYE MOVEMENT DEFICIENCY: Primary | ICD-10-CM

## 2024-07-15 PROCEDURE — 97168 OT RE-EVAL EST PLAN CARE: CPT

## 2024-07-15 NOTE — PROGRESS NOTES
OCCUPATIONAL THERAPY RE- EVALUATION    POC expires Auth Status Total   Visits  Start date  Expiration date PT/OT + Visit Limit? Co-Insurance   24 BOMN BOMN 24   BOMN No                                                                         Visit/Unit Tracking  AUTH Status: BOMN Date  (IE)   7/3 7/10 7/12 7/15    Visits  Authed: BOMN Used 1  1 1 1   1 (full re-eval for auth)      Remaining                   PLAN OF CARE START: 24  PLAN OF CARE END: 2024  PROGRESS NOTE DUE: follow up once scheduled out  FREQUENCY: 2x per week  PRECAUTIONS: fall risk, hx of diabetes  DIAGNOSIS: saccadic eye movement deficiency, BPPV  VISITS: 6      Today's date: 7/15/2024  Patient name: Katlyn Frye  : 1960  MRN: [id number]  Referring provider: Susie Lamb*  Dx:   Encounter Diagnosis     ICD-10-CM    1. 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 Pt reports that the last few weeks of occupational therapy have been going well and she notices an improvement in vertigo related symptoms. Pt recently had an episode this past Saturday in which she became extremely dizzy when getting up to go to the bathroom in the middle of the night and had to utilize grab bars in bathroom to not fall down. Pt is independent with ADLs and IADLs. Pt has been suffering from vertigo symptoms for the last ~10 weeks and has been experiencing symptoms everyday over this time frame. Pt recently underwent VNG that came back abnormal and is due for an MRI this Wednesday. Pt reports that she suffers from tardive dyskinesia with frequent tremors and rapid movements. Pt is still currently reporting difficulty with reading w/o getting eye strain/fatigue, typing, using a keyboard, handwriting, and tolerance for standing tasks. Pt is currently on hold to continue to receive  "neuro PT until she gets an MRI completed this Wednesday. Pt also currently receives ortho PT for b/l arthritis in pts knees. Pt is demonstrating deficits based on clinical observation and the following assessments: vision screen, CISS, anxiety and depression short forms, and Gerald Devick test. Post assessments pt demonstrating the following: continued reduced oculomotor functioning, specifically saccadic eye movements and sustained attention. Pt demonstrates improvements with L sided visual acuity, and visual perceptual midline, however still demonstrates impairments with saccadic eye movements. Pt achieving no new short term/long term goals, however continues to make G progress towards all goals and is tolerating treatment well. Recommend OP OT 1-2x/wk for the next 8-12 weeks with focus on aforementioned deficits to maximize functional performance and improve QOL. Findings and recommendations discussed with pt, and they are in agreement. Educated pt on charges of insurance, assessments performed with standardized norms, POC, goal creation, and OP OT services.          Subjective  Occupational Profile  *type of home: two level with ~4 EH, has had occasional vertigo episodes on the stairs  *lives with: lives by herself  *vocation: retired   *driving: yes, pt uses [employer] services when symptoms are present  *DME: none  *Assistive device for inside home: none  *Assistive device for outside home: walking sticks as needed  *AE for ADLs: none  *ADL status: independent  *IADL status (cooking, cleaning, community mobility, medication management, finances): assistance with cleaning, independent with everything else  *other: arthritic knees     PATIENT GOAL: \"To feel less dizzy\" and \"to be able to read without fatigue\"    PAIN: b/l knees; restin-3/10, going up the stairs: 7-8/10    HISTORY OF PRESENT ILLNESS:   Pt is a 63 y.o. female who was referred to Occupational Therapy s/p Saccadic eye movement " "deficiency [H55.81].     As per MD: \"Mrs. Frye is a 63-year-old female who presented to [facility] multiple sclerosis center for evaluation of vertigo and abnormal VNG findings.  Patient was initially evaluated by seen PT and ENT VNG abnormal done 5/22/24 and her Symptoms started 6 weeks ago.  Patient describes she was in her bed when she turned over trying to get out of the bed she felt a room spinning sensation and could not stand up at all. Event took place in late April 2024.  After 6 weeks of vestibular therapy patient has still 3-4 seconds of vertigo, but mostly disequilibrium requiring walking sticks for her ambulation to be more steady.  Patient also was hospitalized in September 2023 where she acutely developed abnormal movement in her upper lower extremities with diagnosis had made at that time and started dyskinesia or akathisia. Several regimen adjustments was advised at that time and patient was taking Abilify/Seroquel/brexpiprazole. Patient also discontinued Adderall. Patient continue clonazepam and lithium with trazodone.  Patient stated she has been using Ingrezza 1 month regimen with reasonable improvement in her akathisia noted.  Patient has difficulties using the text on her cell phone she need to use with recognition devices with no voice tremor identified.  Patient did have neurological evaluation at [facility] several years back with multiple tests been completed and nonrevealing, as per the patient.\"      PMH:   Past Medical History:   Diagnosis Date    Arthritis     Bilateral knee    Asthma All my life; pretty mild these days    Bipolar 2 disorder (HCC) 1990    Chronic kidney disease     Depression     Diabetes mellitus (HCC)     Disease of thyroid gland 2009    GERD (gastroesophageal reflux disease)     Vertigo     05//2024       Past Surgical Hx:   Past Surgical History:   Procedure Laterality Date    ABDOMINAL SURGERY  Oct 2007    COLONOSCOPY      DILATION AND CURETTAGE OF UTERUS      " ELECTROCONVULSIVE THERAPY      HYSTERECTOMY  2007    KIDNEY SURGERY Right 2009    KNEE SURGERY  Right meniscus repair    SINUS SURGERY  1987          Objective    Impairment Observations:         Depression Short Form: 8       Anxiety Short Form: 15    Convergence Insufficiency Symptom Survey  Total Score: 30 (previously 32)  22 is indicative of convergence insufficiency     Gerald Devick Test  Test 1: 22.75 seconds w/ 0 errors (previously: 26 seconds) IMPROVED  Test 2: 22.73 seconds w/ 0 errors (previously: 33 seconds) IMPROVED  Test 3: 28.32 seconds w/ 0 errors (previously: 35 seconds) IMPROVED     Status on 7/15/24 PN: impaired Status on IE:  impaired Comments                                        VISION SCREEN         GLASSES (readers)  Also has glasses for distance     GLASSES (readers)  Also has glasses for distance           Symptoms Include nausea, headache, and eye strain  only during vertigo episodes nausea, headache, and eye strain  only during vertigo episodes Remained   Last Eye Exam ~2.5 months ago ~2 months ago            Visual Acuity (near) R eye  20/70     L eye 20/40 R eye  20/70     L eye 20/50 L eye improved   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  esotropia and esophoria Remained   Binocularity (far) B/l esophoria  esotropia and esophoria Remained   Convergence  Diplopia reported at 8 inches Diplopia reported at 7.5 inches Slight decline   Accommodation Blurriness/loss of focus reported at 9.5 inches Blurriness/loss of focus reported at 9 inches Slight decline   Bean String              Alignment Y and suppression  X and alignment    Mobility              Pursuits Smooth in all planes smooth in all planes HA reported at 4/10           Saccades Jerky in all planes jerky in all planes Overshooting/undershooting noted           Range of Motion reduced reduced Remained   Confrontation testing  WFL WFL Remained   Visual perceptual midline              Midline Accurate 1/2-1 inch off in all planes  Improved           Horizon  1/2-1 inch off in all planes 1/2-1 inch off in all planes Remained       SHORT TERM GOALS (4-6 weeks)    GOAL  STATUS ON IE  GOAL STATUS    Pt will increase oculomotor control for improved saccades, pursuits, con/divergent tasks for improved reading, board to table tasks x 50% accuracy with minimal increase in symptoms.  Progressing, continue goal   Pt will increase oculomotor control for improved dynamic activities with head turns, board/screen to table tasks symptom free with 50% accuracy for improved life roles.  Progressing, continue goal   Pt will improve oculomotor coordination with and without vestibular integration to 50% as evidenced by saccades and pursuits screen to improve environmental navigation with ADLs/IADLs.  Progressing, continue goal   Pt will improve near point convergence from 7.5 inches to WFL to improve participation in near tasks within daily routines.  7.5 inches Slight decline, however continue goal   LONG TERM GOALS (8-12 weeks)    GOAL  STATUS ON IE  GOAL STATUS    Pt will increase oculomotor control for improved saccades, pursuits, con/divergent tasks for improved reading, board to table tasks x 75% accuracy with minimal increase in symptoms.  Progressing, continue goal   Pt will increase oculomotor control for improved dynamic activities with head turns, board/screen to table tasks symptom free with 75% accuracy for improved life roles.   Progressing, continue goal   Pt will demo with G carryover of compensatory and visual search strategies for visual tracking/gazing in b/l peripheral visual fields to improve engagement and functional performance in salient tasks.  Progressing, continue goal   Pt will improve oculomotor coordination with and without vestibular integration to 75% as evidenced by saccades and pursuits screen to improve environmental navigation with ADLs/IADLs.  Progressing, continue goal   Independent with vision HEP.  Progressing, continue  goal   Pt will score 22/60 on CISS in order to increase independence with life roles. 32 Progressing, continue goal       OTHER PLANNED THERAPY INTERVENTIONS:   Supine, seated, and in stance neuro re-ed  Timed Trials  Manual tx  Hand to target  Multimatrix for saccades/ visual clutter/attention  Multi-modal environment  Sustained/alternating/divided attention  Tracking tube  Oculomotor control:  saccades, con/divergence  Conv./div. Dynamic tasks  Work stations with timed transitions  Edu on cog/vision apps

## 2024-07-16 ENCOUNTER — CONSULT (OUTPATIENT)
Age: 64
End: 2024-07-16
Payer: COMMERCIAL

## 2024-07-16 ENCOUNTER — APPOINTMENT (OUTPATIENT)
Age: 64
End: 2024-07-16
Payer: COMMERCIAL

## 2024-07-16 VITALS
OXYGEN SATURATION: 98 % | BODY MASS INDEX: 36.16 KG/M2 | HEIGHT: 66 IN | SYSTOLIC BLOOD PRESSURE: 110 MMHG | HEART RATE: 78 BPM | TEMPERATURE: 98.2 F | DIASTOLIC BLOOD PRESSURE: 76 MMHG | RESPIRATION RATE: 16 BRPM | WEIGHT: 225 LBS

## 2024-07-16 DIAGNOSIS — G47.00 INSOMNIA, UNSPECIFIED TYPE: ICD-10-CM

## 2024-07-16 DIAGNOSIS — E66.9 OBESITY (BMI 30-39.9): ICD-10-CM

## 2024-07-16 DIAGNOSIS — G47.33 OSA (OBSTRUCTIVE SLEEP APNEA): Primary | ICD-10-CM

## 2024-07-16 PROCEDURE — 99204 OFFICE O/P NEW MOD 45 MIN: CPT | Performed by: INTERNAL MEDICINE

## 2024-07-16 NOTE — PROGRESS NOTES
"Assessment/Plan:     Diagnoses and all orders for this visit:    NYLA (obstructive sleep apnea)  -     Home Study; Future    Insomnia, unspecified type  -     Ambulatory Referral to Sleep Medicine    Obesity (BMI 30-39.9)  -     Home Study; Future          Plan for follow up:  Patient has signs and symptoms of obstructive sleep apnea.  Etiology pathogenesis of obstructive sleep apnea discussed in detail  Consequences of untreated sleep apnea discussed with excessive daytime sleepiness, increased risk for myocardial infarction stroke atrial fibrillation discussed  Testing procedure was discussed during have the home sleep study done and follow-up limitations of the home sleep study were discussed understands and verbalizes  If there is evidence of abnormal nocturnal breathing depending on the severity of her sleep apnea she would like to go in for the inspire if possible she does not want his CPAP discussed if very mild sleep apnea we can also talk about mandibular advancing appliance as well  Cautioned against driving when sleepy.  Recommend weight loss  Follow-up after the above testing.  No follow-ups on file.  All questions are answered to the patient's satisfaction and understanding.  She verbalizes understanding.  She is encouraged to call with any further questions or concerns.    Portions of the record may have been created with voice recognition software.  Occasional wrong word or \"sound a like\" substitutions may have occurred due to the inherent limitations of voice recognition software.  Read the chart carefully and recognize, using context, where substitutions have occurred.a    Electronically Signed by Hien Garcia MD    ______________________________________________________________________    Chief Complaint:   Chief Complaint   Patient presents with    Sleep Apnea        Patient ID: Katlyn is a 63 y.o. y.o. female has a past medical history of Arthritis, Asthma (All my life; pretty mild these days), " Bipolar 2 disorder (HCC) (1990), Chronic bronchitis (HCC), Chronic kidney disease, Depression, Diabetes mellitus (HCC), Disease of thyroid gland (2009), GERD (gastroesophageal reflux disease), Pneumonia (2020), Sleep apnea, obstructive, and Vertigo.    7/16/2024  Patient presents today for initial visit.  Katlyn is a very pleasant 63-year-old lady, who is currently retired used to work as an  in the past, prior history of obstructive sleep apnea was on CPAP for almost 4 to 6 years she states that then she stopped using it because she could not tolerate it and then she also states she lost around 60 pounds and at least for the past few years has not been using the CPAP she states.  Recently she was following up with hematology given the increased hematocrit and the prior history of obstructive sleep apnea, has been referred back.  She states she has significant sleep issues she is mean currently on medications with lithium as well as gabapentin and takes Klonopin and is able to fall asleep quickly with less than half an hour once she takes his medications.  She goes to bed at around 9 PM falls asleep quickly and is out of bed at 6 AM has 2 nocturnal awakenings for nocturia and when she is out of the bed she states she does feel well rested does not have any naps during the daytime.        Occupational/Exposure history: Currently retired  Pets/Enviroment: None  Travel history: None  Review of Systems   Constitutional:  Positive for fatigue.   HENT: Negative.     Eyes: Negative.    Respiratory: Negative.          History of loud snoring witnessed apneic spells occasional choking and gasping for air.   Cardiovascular: Negative.    Gastrointestinal: Negative.    Endocrine: Negative.    Genitourinary: Negative.    Musculoskeletal: Negative.    Allergic/Immunologic: Negative.    Neurological: Negative.    Psychiatric/Behavioral: Negative.         Social history: She reports that she has never smoked. She  has never been exposed to tobacco smoke. She has never used smokeless tobacco. She reports that she does not currently use alcohol. She reports that she does not use drugs.    Past surgical history:   Past Surgical History:   Procedure Laterality Date    ABDOMINAL SURGERY  Oct 2007    COLONOSCOPY      DILATION AND CURETTAGE OF UTERUS      ELECTROCONVULSIVE THERAPY      HYSTERECTOMY  2007    KIDNEY SURGERY Right 2009    KNEE SURGERY  Right meniscus repair    SINUS SURGERY       Family history:   Family History   Problem Relation Age of Onset    Heart disease Mother     Arthritis Mother     COPD Mother     Stroke Mother     Colon cancer Father     Neuropathy Father     Cancer Father         Colon cancer    Colon cancer Maternal Grandmother     Cancer Maternal Grandmother         Colon cancer    Diabetes Maternal Grandfather     Mental illness Paternal Grandfather     Heart attack Brother     Breast cancer Cousin     Asthma Sister     Depression Sister     Mental illness Sister     Heart disease Brother          of a massive heart attack    Diabetes Maternal Aunt        Immunization History   Administered Date(s) Administered    COVID-19 Pfizer mRNA vacc PF julienne-sucrose 12 yr and older (Comirnaty) 10/14/2023    INFLUENZA 10/14/2023    Influenza Injectable, MDCK, Preservative Free, Quadrivalent, 0.5 mL 2019, 2019    Pneumococcal Conjugate 13-Valent 2019    Pneumococcal Conjugate Vaccine 20-valent (Pcv20), Polysace 10/06/2023    Respiratory Syncytial Virus Vaccine (Recombinant, Adjuvanted) 10/06/2023    Zoster Vaccine Recombinant 2019     Current Outpatient Medications   Medication Sig Dispense Refill    albuterol (Ventolin HFA) 90 mcg/act inhaler Inhale 2 puffs every 6 (six) hours as needed for wheezing 18 g 5    Amantadine HCl 100 MG tablet Take 100 mg by mouth 3 (three) times a day      clonazePAM (KlonoPIN) 1 mg tablet Take 1 mg by mouth 2 (two) times a day      ELDERBERRY PO Take by  "mouth      ergocalciferol (VITAMIN D2) 50,000 units       folic acid (FOLVITE) 400 mcg tablet Take 1 tablet (400 mcg total) by mouth daily 90 tablet 1    gabapentin (NEURONTIN) 300 mg capsule Take 300 mg by mouth 3 (three) times a day      Ingrezza 80 MG CAPS 80 mg      liothyronine (CYTOMEL) 25 mcg tablet Take 25 mcg by mouth every other day      lithium carbonate (LITHOBID) 300 mg CR tablet TAKE 3 TABLETS BY ORAL ROUTE AT BEDTIME      Ozempic, 0.25 or 0.5 MG/DOSE, 2 MG/3ML injection pen       pyridoxine (VITAMIN B6) 100 mg tablet Take 100 mg by mouth daily      SUMAtriptan (IMITREX) 50 mg tablet Take 50 mg by mouth daily as needed      traZODone (DESYREL) 50 mg tablet Take 50 mg by mouth daily at bedtime      Vraylar 1.5 MG capsule Take 1.5 mg by mouth daily      fluticasone (FLONASE) 50 mcg/act nasal spray ONE SPRAY EACN NOSTRIL TWICE A DAY      levothyroxine 100 mcg tablet Take 1 tablet (100 mcg total) by mouth daily 90 tablet 1    methocarbamol (ROBAXIN) 750 mg tablet 750 mg (Patient not taking: Reported on 6/19/2024)       No current facility-administered medications for this visit.     Allergies: Iv contrast [iodinated contrast media], Lamictal [lamotrigine], Latuda [lurasidone], Levaquin [levofloxacin], Dulaglutide, Benztropine, Brexpiprazole, Escitalopram, Food, and Lumateperone    Objective:  Vitals:    07/16/24 1000 07/16/24 1002   BP: 110/76    BP Location: Left arm    Patient Position: Sitting    Cuff Size: Large    Pulse: 78    Resp: 16    Temp: 98.2 °F (36.8 °C)    SpO2: 98% 98%   Weight: 102 kg (225 lb)    Height: 5' 6\" (1.676 m)    Oxygen Therapy  SpO2: 98 %  Oxygen Therapy: None (Room air)  .  Wt Readings from Last 3 Encounters:   07/16/24 102 kg (225 lb)   06/19/24 103 kg (226 lb 1.6 oz)   06/14/24 104 kg (229 lb 6.4 oz)     Body mass index is 36.32 kg/m².    Physical Exam  Vitals and nursing note reviewed.   Constitutional:       Appearance: She is well-developed.   HENT:      Head: Normocephalic " and atraumatic.      Mouth/Throat:      Comments: Crowded oropharyngeal airways  Eyes:      Conjunctiva/sclera: Conjunctivae normal.      Pupils: Pupils are equal, round, and reactive to light.   Neck:      Thyroid: No thyromegaly.      Vascular: No JVD.   Cardiovascular:      Rate and Rhythm: Normal rate and regular rhythm.      Heart sounds: Normal heart sounds. No murmur heard.     No friction rub. No gallop.   Pulmonary:      Effort: Pulmonary effort is normal. No respiratory distress.      Breath sounds: Normal breath sounds. No wheezing or rales.   Chest:      Chest wall: No tenderness.   Musculoskeletal:         General: No tenderness or deformity. Normal range of motion.      Cervical back: Normal range of motion and neck supple.   Lymphadenopathy:      Cervical: No cervical adenopathy.   Skin:     General: Skin is warm and dry.   Neurological:      Mental Status: She is alert and oriented to person, place, and time.         Lab Review:   Appointment on 06/24/2024   Component Date Value    Copper 06/24/2024 104     Ceruloplasmin 06/24/2024 22.0     Vitamin B1, Whole Blood 06/24/2024 98.7     Vitamin B6 06/24/2024 51.4     Vitamin B-12 06/24/2024 1,304 (H)     Folate 06/24/2024 5.6 (L)    Appointment on 06/13/2024   Component Date Value    Sodium 06/13/2024 139     Potassium 06/13/2024 4.3     Chloride 06/13/2024 106     CO2 06/13/2024 27     ANION GAP 06/13/2024 6     BUN 06/13/2024 9     Creatinine 06/13/2024 0.88     Glucose, Fasting 06/13/2024 159 (H)     Calcium 06/13/2024 9.4     eGFR 06/13/2024 69    Appointment on 04/04/2024   Component Date Value    IgA 04/04/2024 439 (H)     Gliadin IgA 04/04/2024 6     Gliadin IgG 04/04/2024 4     Tissue Transglut Ab IGG 04/04/2024 2     TISSUE TRANSGLUTAMINASE * 04/04/2024 <2     Endomysial IgA 04/04/2024 Negative    Appointment on 03/14/2024   Component Date Value    Total Counted 03/14/2024 100     Segmented % 03/14/2024 65     Lymphocytes % 03/14/2024 17      Monocytes % 03/14/2024 6     Eosinophils % 03/14/2024 6     Atypical Lymphocytes % 03/14/2024 6 (H)     RBC Morphology 03/14/2024 Present     Platelet Estimate 03/14/2024 Adequate     Anisocytosis 03/14/2024 Present    Appointment on 03/11/2024   Component Date Value    Sodium 03/11/2024 140     Potassium 03/11/2024 3.9     Chloride 03/11/2024 104     CO2 03/11/2024 27     ANION GAP 03/11/2024 9     BUN 03/11/2024 12     Creatinine 03/11/2024 0.83     Glucose, Fasting 03/11/2024 120 (H)     Calcium 03/11/2024 9.3     AST 03/11/2024 13     ALT 03/11/2024 15     Alkaline Phosphatase 03/11/2024 55     Total Protein 03/11/2024 7.4     Albumin 03/11/2024 4.3     Total Bilirubin 03/11/2024 0.61     eGFR 03/11/2024 74     TSH 3RD GENERATON 03/11/2024 0.945     WBC 03/11/2024 10.56 (H)     RBC 03/11/2024 5.07     Hemoglobin 03/11/2024 15.5 (H)     Hematocrit 03/11/2024 48.2 (H)     MCV 03/11/2024 95     MCH 03/11/2024 30.6     MCHC 03/11/2024 32.2     RDW 03/11/2024 13.7     MPV 03/11/2024 12.2     Platelets 03/11/2024 219     nRBC 03/11/2024 0     Segmented % 03/11/2024 73     Immature Grans % 03/11/2024 0     Lymphocytes % 03/11/2024 16     Monocytes % 03/11/2024 7     Eosinophils Relative 03/11/2024 3     Basophils Relative 03/11/2024 1     Absolute Neutrophils 03/11/2024 7.67 (H)     Absolute Immature Grans 03/11/2024 0.04     Absolute Lymphocytes 03/11/2024 1.70     Absolute Monocytes 03/11/2024 0.72     Eosinophils Absolute 03/11/2024 0.36     Basophils Absolute 03/11/2024 0.07     Sodium, Ur 03/11/2024 65     Osmolality, Ur 03/11/2024 538    Office Visit on 02/08/2024   Component Date Value    Hemoglobin A1C 02/08/2024 6.1    Hospital Outpatient Visit on 01/19/2024   Component Date Value    POC Glucose 01/19/2024 158 (H)     Case Report 01/19/2024                      Value:Surgical Pathology Report                         Case: [id number]                                  Authorizing Provider:  Shantal Elizalde MD            Collected:           01/19/2024 0726              Ordering Location:      [facility]       Received:            01/19/2024 [address] [place] [address] Endoscopy                                                             Pathologist:           Chandni Rodrigez MD                                                         Specimens:   A) - Colon, right colon; hx ulcerative colitis                                                      B) - Colon, left colon; hx ulcerative colitis                                              Final Diagnosis 01/19/2024                      Value:A. Right colon (biopsy):                          - Colonic mucosa with no diagnostic abnormality                          - No active or microscopic colitis                          - Negative for dysplasia                                                     B. Left colon (biopsy):                          - Colonic mucosa with no diagnostic abnormality                          - No active or microscopic colitis                          - Negative for dysplasia                                                         Additional Information 01/19/2024                      Value:All reported additional testing was performed with appropriately reactive                           controls.  These tests were developed and their performance                           characteristics determined by [facility] or                           appropriate performing facility, though some tests may be performed on                           tissues which have not been validated for performance characteristics                           (such as staining performed on alcohol exposed cell blocks and decalcified                           tissues).  Results should be interpreted with caution and in the context                           of the patients’ clinical condition. These tests may not be cleared or               "             approved by the U.S. Food and Drug Administration, though the FDA has                           determined that such clearance or approval is not necessary. These tests                           are used for clinical purposes and they should not be regarded as                           investigational or for research. This laboratory has been approved by CLIA                           88, designated as a high-complexity laboratory and is qualified to perform                           these tests.                          .    Gross Description 01/19/2024                      Value:A. The specimen is received in formalin, labeled with the patient's name                           and hospital number, and is designated \" right colon\".  The specimen                           consists of 2 tan soft tissue fragments measuring 0.1 and 0.5 cm in                           greatest dimension.  Entirely submitted. One screened cassette.                          B. The specimen is received in formalin, labeled with the patient's name                           and hospital number, and is designated \" left colon\".  The specimen                           consists of 4 tan soft tissue fragments measuring range from 0.2-0.4 cm in                           greatest dimension.  Entirely submitted. One screened cassette.                                                    Note: The estimated total formalin fixation time based upon information                           provided by the submitting clinician and the standard processing schedule                           is under 72 hours.                                                                              Vicente    Clinical Information 01/19/2024                      Value:Cold bx r/o microscopic inflammation       Diagnostics:  I have personally reviewed pertinent reports.    ESS: Total score: 2  "

## 2024-07-17 ENCOUNTER — APPOINTMENT (OUTPATIENT)
Age: 64
End: 2024-07-17
Payer: COMMERCIAL

## 2024-07-17 ENCOUNTER — HOSPITAL ENCOUNTER (OUTPATIENT)
Dept: MRI IMAGING | Facility: CLINIC | Age: 64
Discharge: HOME | End: 2024-07-17
Payer: COMMERCIAL

## 2024-07-17 DIAGNOSIS — H55.81 SACCADIC EYE MOVEMENT DEFICIENCY: Primary | ICD-10-CM

## 2024-07-17 DIAGNOSIS — G24.01 TARDIVE DYSKINESIA: ICD-10-CM

## 2024-07-17 DIAGNOSIS — H55.81 SACCADIC EYE MOVEMENT DEFICIENCY: ICD-10-CM

## 2024-07-17 DIAGNOSIS — G25.71 AKATHISIA: ICD-10-CM

## 2024-07-17 PROCEDURE — 70551 MRI BRAIN STEM W/O DYE: CPT

## 2024-07-22 ENCOUNTER — TELEPHONE (OUTPATIENT)
Age: 64
End: 2024-07-22

## 2024-07-22 ENCOUNTER — OFFICE VISIT (OUTPATIENT)
Age: 64
End: 2024-07-22
Payer: COMMERCIAL

## 2024-07-22 DIAGNOSIS — H55.81 SACCADIC EYE MOVEMENT DEFICIENCY: Primary | ICD-10-CM

## 2024-07-22 PROCEDURE — 97112 NEUROMUSCULAR REEDUCATION: CPT

## 2024-07-22 NOTE — TELEPHONE ENCOUNTER
Pt called in to let Dr. Amin know that, following her MRI on 7/17, she felt N/V, severe vertigo and also dizziness. The dizziness has somewhat persisted. Pt just wanted Dr. Amin to know.    TAPAN

## 2024-07-22 NOTE — PROGRESS NOTES
POC expires Auth Status Total   Visits  Start date  Expiration date PT/OT + Visit Limit? Co-Insurance   24 BOMN BOMN 24   BOMN No                                                                         Visit/Unit Tracking  AUTH Status: BOMN Date  (IE) 6/27  7/3 7/10 7/12 7/15 7/22   Visits  Authed: BOMN Used 1  1 1 1   1 (full re-eval for auth)      Remaining             1      PLAN OF CARE START: 24  PLAN OF CARE END: 2024  PROGRESS NOTE DUE:24 (full re-eval for auth, trial new assessment if appropriate)   FREQUENCY: 2x per week  PRECAUTIONS: fall risk, hx of diabetes  DIAGNOSIS: saccadic eye movement deficiency, BPPV  VISITS: 2 of 3      Daily Note     Today's date: 2024  Patient name: Katlyn Frye  : 1960  MRN: [id number]  Referring provider: Susie Lamb*  Dx:   Encounter Diagnosis     ICD-10-CM    1. 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 Pt reports severe onset of nausea post MRI on , which has been limiting participating in daily tasks since.       Objective: See treatment diary below.   NMRE:  *pencil push ups with small print MUNOZ chart seated. Focused on convergence skills. Noted pt able to bring pencil 6-7 inches from face. Min VC for technique. Provided with 1 for HEP    -x1 bout verbalizing letters seated   -x1 bout naming foods based off of letter     *clock rotations standing, thumb at near point and clock far point, x4 bouts total (x2 counter clockwise, x2 clockwise). Emphasis on convergence/divergence. Reports difficulty with visual fixation. Educated in performance for HEP    *pie pan rotations seated tabletop, 1 minute with VC for technique. Provided with handout for HEP.     *17x22 small print munoz chart-reading top>bottom, L>R horizontally seated. Some errors noted, pt able to self correct. Placed page horizontally on tabletop. Instructed in completion for HEP.        Assessment: Tolerated treatment fair. Reports eye fatigue and strain post session. Focus of session on visual functions and education on comprehensive HEP.  Patient would benefit from continued OT to address oculomotor control for improved saccades, pursuits, con/divergent tasks for improved reading, board to table tasks, dynamic activities with head turns, board/screen to table tasks, and independence with vision HEP.       Plan: Continue per plan of care.

## 2024-07-25 ENCOUNTER — TELEPHONE (OUTPATIENT)
Age: 64
End: 2024-07-25

## 2024-07-25 NOTE — TELEPHONE ENCOUNTER
Received a call from patient regarding the results of her MRI.   Advised MRI was unremarkable.     Patient stated her dizziness and vertigo persists and has gotten much worse post MRI. Patient denies N/V. Patient would like to know what are the next steps.     CB# [phone] Ok to leave a detailed message.      Last OV 6-19-24 Dr LY  Next OV Not scheduled    Dr Amin, please advise. Thank you!

## 2024-07-26 ENCOUNTER — APPOINTMENT (OUTPATIENT)
Age: 64
End: 2024-07-26
Payer: COMMERCIAL

## 2024-07-27 NOTE — TELEPHONE ENCOUNTER
Other than Folate deficiency, no other serologic findings appreciated.  MRI brain unremarkable. No signes of stroke, CNS demyelination, or other metabolic induced basal ganglia/GP dysfunction     Unclear why consult by Movement disorder specialist was cancelled in September 2024- please clarify if we can offer a specialist evaluation.

## 2024-07-30 ENCOUNTER — EVALUATION (OUTPATIENT)
Age: 64
End: 2024-07-30
Payer: COMMERCIAL

## 2024-07-30 DIAGNOSIS — H55.81 SACCADIC EYE MOVEMENT DEFICIENCY: Primary | ICD-10-CM

## 2024-07-30 DIAGNOSIS — H81.13 BENIGN PAROXYSMAL POSITIONAL VERTIGO DUE TO BILATERAL VESTIBULAR DISORDER: ICD-10-CM

## 2024-07-30 PROCEDURE — 97168 OT RE-EVAL EST PLAN CARE: CPT

## 2024-07-30 PROCEDURE — 97530 THERAPEUTIC ACTIVITIES: CPT

## 2024-07-30 NOTE — PROGRESS NOTES
OCCUPATIONAL THERAPY RE- EVALUATION    POC expires Auth Status Total   Visits  Start date  Expiration date PT/OT + Visit Limit? Co-Insurance   24 BOMN BOMN 24   BOMN No                                                                         Visit/Unit Tracking  AUTH Status: BOMN Date  (IE)   7/3 7/10 7/12 7/15 7/22 7/30   Visits  Authed: BOMN Used 1  1 1 1   1 (full re-eval for auth) 1 1 (full re-eval for auth)     Remaining                     PLAN OF CARE START: 24  PLAN OF CARE END: 2024  PROGRESS NOTE DUE: follow up once auth is approved  FREQUENCY: 2x per week  PRECAUTIONS: fall risk, hx of diabetes  DIAGNOSIS: saccadic eye movement deficiency, BPPV  VISITS: 3 of 3      Today's date: 2024  Patient name: Katlyn Frye  : 1960  MRN: [id number]  Referring provider: Susie Lamb*  Dx:   Encounter Diagnosis     ICD-10-CM    1. 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 Benign paroxysmal positional vertigo due to bilateral vestibular disorder  H81.13               SKILLED ANALYSIS:  Pt is a 63 year old female presenting to re-evluation on 24 to OP OT s/p saccadic eye movement deficiency and BPPV. Pt reports that the last few weeks of occupational therapy have been going well and she notices an improvement in vertigo related symptoms. Pt reports 1-2 recent episodes in the last two weeks. Pt is independent with ADLs and IADLs. Pt has been suffering from vertigo symptoms for the last ~12 weeks and has been experiencing symptoms everyday over this time frame. Pt reports that her MRI of her brain two weeks ago came back clear. She did however get a bad reaction to MRI and had a vertigo episode that resulting in tremendous amounts of dizziness and was nauseous. Pt is still experiencing tardive dyskinesia with frequent tremors and rapid movements. Pt reports improvement with reading in short increments. Pt is still currently reporting typing, using a  "keyboard, handwriting, and tolerance for standing tasks. Pt also currently receives ortho PT for b/l arthritis in pts knees. Pt is demonstrating deficits based on clinical observation and the following assessments: vision screen, CISS, anxiety and depression short forms, Sn[place]grove maze test, and Gerald Devick test. Post assessments pt demonstrating the following: improvements with convergence insufficiency symptoms, saccades, pursuits, and overall oculomotor functioning. However, pt does still experience oculomotor deficits overall and decreased sustained attention. Pt achieving three short term and one long term goal, and continues to make G progress towards all goals and is tolerating treatment well. Pt would greatly benefit from continuing skilled OT services to address overall oculomotor functioning and sustained attention. Recommend OP OT 1-2x/wk for the next 8-12 weeks with focus on aforementioned deficits to maximize functional performance and improve QOL. Findings and recommendations discussed with pt, and they are in agreement. Educated pt on charges of insurance, assessments performed with standardized norms, POC, goal creation, and OP OT services.        Subjective  Occupational Profile  *type of home: two level with ~4 EH, has had occasional vertigo episodes on the stairs  *lives with: lives by herself  *vocation: retired   *driving: yes, pt uses [employer] services when symptoms are present  *DME: none  *Assistive device for inside home: none  *Assistive device for outside home: walking sticks as needed  *AE for ADLs: none  *ADL status: independent  *IADL status (cooking, cleaning, community mobility, medication management, finances): assistance with cleaning, independent with everything else  *other: arthritic knees     PATIENT GOAL: \"To feel less dizzy\" and \"to be able to read without fatigue\"    PAIN: b/l knees; restin-310, going up the stairs: 7-8/10    HISTORY OF PRESENT ILLNESS: " "  Pt is a 63 y.o. female who was referred to Occupational Therapy s/p Saccadic eye movement deficiency [H55.81].     As per MD: \"Mrs. Frye is a 63-year-old female who presented to [facility] multiple sclerosis center for evaluation of vertigo and abnormal VNG findings.  Patient was initially evaluated by seen PT and ENT VNG abnormal done 5/22/24 and her Symptoms started 6 weeks ago.  Patient describes she was in her bed when she turned over trying to get out of the bed she felt a room spinning sensation and could not stand up at all. Event took place in late April 2024.  After 6 weeks of vestibular therapy patient has still 3-4 seconds of vertigo, but mostly disequilibrium requiring walking sticks for her ambulation to be more steady.  Patient also was hospitalized in September 2023 where she acutely developed abnormal movement in her upper lower extremities with diagnosis had made at that time and started dyskinesia or akathisia. Several regimen adjustments was advised at that time and patient was taking Abilify/Seroquel/brexpiprazole. Patient also discontinued Adderall. Patient continue clonazepam and lithium with trazodone.  Patient stated she has been using Ingrezza 1 month regimen with reasonable improvement in her akathisia noted.  Patient has difficulties using the text on her cell phone she need to use with recognition devices with no voice tremor identified.  Patient did have neurological evaluation at [facility] several years back with multiple tests been completed and nonrevealing, as per the patient.\"      PMH:   Past Medical History:   Diagnosis Date    Arthritis     Bilateral knee    Asthma All my life; pretty mild these days    Bipolar 2 disorder (HCC) 1990    Chronic bronchitis (HCC)     Chronic kidney disease     Depression     Diabetes mellitus (HCC)     Disease of thyroid gland 2009    GERD (gastroesophageal reflux disease)     Pneumonia 2020    Sleep apnea, obstructive     Vertigo     05//2024 "       Past Surgical Hx:   Past Surgical History:   Procedure Laterality Date    ABDOMINAL SURGERY  Oct 2007    COLONOSCOPY      DILATION AND CURETTAGE OF UTERUS      ELECTROCONVULSIVE THERAPY      HYSTERECTOMY  2007    KIDNEY SURGERY Right 2009    KNEE SURGERY  Right meniscus repair    SINUS SURGERY  1987          Objective    Impairment Observations:         Depression Short Form: 8       Anxiety Short Form: 15    Convergence Insufficiency Symptom Survey (status on 7/30/24):  Total Score: 30 (previously 32)  22 is indicative of convergence insufficiency     Gerald Devick Test (status on 7/30/24):  Test 1: 20 seconds w/ 0 errors (previously: 23 seconds) IMPROVED  Test 2: 20 seconds w/ 0 errors (previously: 22 seconds) IMPROVED  Test 3: 22 seconds w/ 0 errors (previously: 28 seconds) IMPROVED  Total Time: 62 seconds    [employer] MAZE TEST: assesses attention, visuoconstructional ability, EF skills, and foresight    CUT OFF SCORES INDICATING DEFICITS:   * >61 SECONDS WITH OR WITHOUT ERROS  *60 SECONDS BUT WITH 2 OR MORE ERROS   CUTOFF SCORES WITHIN NORM:   *<60 SECONDS WITH 1 OR NO ERRORS   STATUS ON RE-EVAL: 7/30/24 1 minute 27 seconds with no errors (impaired)                Status on 7/30/24 Re-Eval: Status on 7/15/24 Re-Eval: Status on IE:  impaired Comments                                        VISION SCREEN         GLASSES (readers)  Also has glasses for distance       GLASSES (readers)  Also has glasses for distance     GLASSES (readers)  Also has glasses for distance            Symptoms Include nausea, headache, and eye strain  only during vertigo episodes, however less severe nausea, headache, and eye strain  only during vertigo episodes nausea, headache, and eye strain  only during vertigo episodes Remained   Last Eye Exam ~3 months ago ~2.5 months ago ~2 months ago             Visual Acuity (near) R eye  20/70     L eye 20/50 R eye  20/70     L eye 20/40 R eye  20/70     L eye 20/50 Remained   Binocularity  (near) esotropia and esophoria B/l esophoria  esotropia and esophoria Remained   Binocularity (far) orthotropia and orthophoria B/l esophoria  esotropia and esophoria Improved   Convergence  Diplopia reported at 9 inches Diplopia reported at 8 inches Diplopia reported at 7.5 inches Slight decline   Accommodation Blurriness/loss of focus reported at 10 inches Blurriness/loss of focus reported at 9.5 inches Blurriness/loss of focus reported at 9 inches Slight decline   Bean String               Alignment Y and suppression Y and suppression  X and alignment Remained   Mobility               Pursuits Smooth in all planes Smooth in all planes smooth in all planes Remained, no HA           Saccades Jerky in all planes Jerky in all planes jerky in all planes Overshooting/undershooting noted           Range of Motion reduced reduced reduced Remained   Confrontation testing  WFL WFL WFL Remained   Visual perceptual midline               Midline Accurate Accurate 1/2-1 inch off in all planes Remained           Horizon  1/2 inch off in all planes 1/2-1 inch off in all planes 1/2-1 inch off in all planes Improved       SHORT TERM GOALS (4-6 weeks)    GOAL  STATUS ON IE  GOAL STATUS    Pt will increase oculomotor control for improved saccades, pursuits, con/divergent tasks for improved reading, board to table tasks x 50% accuracy with minimal increase in symptoms.  ACHIEVED   Pt will increase oculomotor control for improved dynamic activities with head turns, board/screen to table tasks symptom free with 50% accuracy for improved life roles.  ACHIEVED   Pt will improve oculomotor coordination with and without vestibular integration to 50% as evidenced by saccades and pursuits screen to improve environmental navigation with ADLs/IADLs.  ACHIEVED   Pt will improve near point convergence from 7.5 inches to WFL to improve participation in near tasks within daily routines.  7.5 inches Slight decline, however continue goal   LONG  TERM GOALS (8-12 weeks)    GOAL  STATUS ON IE  GOAL STATUS    Pt will increase oculomotor control for improved saccades, pursuits, con/divergent tasks for improved reading, board to table tasks x 75% accuracy with minimal increase in symptoms.  Progressing, continue goal   Pt will increase oculomotor control for improved dynamic activities with head turns, board/screen to table tasks symptom free with 75% accuracy for improved life roles.   Progressing, continue goal   Pt will demo with G carryover of compensatory and visual search strategies for visual tracking/gazing in b/l peripheral visual fields to improve engagement and functional performance in salient tasks.  Progressing, continue goal   Pt will improve oculomotor coordination with and without vestibular integration to 75% as evidenced by saccades and pursuits screen to improve environmental navigation with ADLs/IADLs.  Progressing, continue goal   Independent with vision HEP.  ACHIEVED   Pt will perform Gerald Devick test in < 60 seconds or less in order to improve eye saccadic functions for life roles.  62 Established   Pt will perform [place] maze test WFL with no errors to assist with sustained attention and improved pursuits for everyday tasks.  1:27 Established   Pt will score 22/60 on CISS in order to increase independence with life roles. 32 Progressing, continue goal       OTHER PLANNED THERAPY INTERVENTIONS:   Supine, seated, and in stance neuro re-ed  Timed Trials  Manual tx  Hand to target  Multimatrix for saccades/ visual clutter/attention  Multi-modal environment  Sustained/alternating/divided attention  Tracking tube  Oculomotor control:  saccades, con/divergence  Conv./div. Dynamic tasks  Work stations with timed transitions  Edu on cog/vision apps

## 2024-07-31 ENCOUNTER — APPOINTMENT (OUTPATIENT)
Age: 64
End: 2024-07-31
Payer: COMMERCIAL

## 2024-08-07 ENCOUNTER — OFFICE VISIT (OUTPATIENT)
Age: 64
End: 2024-08-07
Payer: COMMERCIAL

## 2024-08-07 VITALS
BODY MASS INDEX: 36.55 KG/M2 | OXYGEN SATURATION: 96 % | DIASTOLIC BLOOD PRESSURE: 70 MMHG | HEIGHT: 66 IN | SYSTOLIC BLOOD PRESSURE: 110 MMHG | HEART RATE: 64 BPM | TEMPERATURE: 98 F | RESPIRATION RATE: 16 BRPM | WEIGHT: 227.4 LBS

## 2024-08-07 DIAGNOSIS — R21 RASH: Primary | ICD-10-CM

## 2024-08-07 PROCEDURE — 99213 OFFICE O/P EST LOW 20 MIN: CPT

## 2024-08-07 RX ORDER — TRIAMCINOLONE ACETONIDE 5 MG/G
CREAM TOPICAL 3 TIMES DAILY
Qty: 15 G | Refills: 1 | Status: SHIPPED | OUTPATIENT
Start: 2024-08-07

## 2024-08-07 RX ORDER — CLINDAMYCIN PHOSPHATE 10 MG/G
GEL TOPICAL
COMMUNITY
Start: 2024-08-06

## 2024-08-07 NOTE — PROGRESS NOTES
Ambulatory Visit  Name: Katlyn Frye      : 1960      MRN: [id number]  Encounter Provider: Maty Landaverde PA-C  Encounter Date: 2024   Encounter department: [facility] CARE [place]    Assessment & Plan   1. Rash  -     triamcinolone (KENALOG) 0.5 % cream; Apply topically 3 (three) times a day  Unknown etiology.  No new medications or known allergen exposure.  Recommend she apply triamcinolone cream to the skin twice a day for the next 5-7 days.  Follow-up if no improvement or worsening of symptoms.  Go to the ER if she develops any swelling of the lips, mouth, tongue, throat or difficulty breathing.     History of Present Illness     Itching rash on chest and breasts for 4-5 days.   Simliar to poison ivy or sumac rash that she has had in the past. Took steroids to get rid of it in the past.  She has had an allergic rash to the drug Lamictal in the past is no longer on that medication.  She messaged her psychiatrist to see if any of her current medications could be the cause of her current rash and he said no.  She denies any recent med changes, changes in laundry detergent, body soaps, or lotions.  No new clothing.  No changes in diet.  No swelling of the lips mouth or tongue or difficulty breathing.  She saw hematology yesterday who prescribed her clindamycin 1% topical cream and she tried it with no relief in symptoms.  She denies any exposure to poison ivy or plants.  She tried taking Benadryl and Benadryl cream with no improvement.          Review of Systems   Constitutional:  Negative for chills and fever.   HENT:  Negative for congestion, ear pain and sore throat.    Eyes:  Negative for pain and visual disturbance.   Respiratory:  Negative for cough and shortness of breath.    Cardiovascular:  Negative for chest pain and palpitations.   Gastrointestinal:  Negative for abdominal pain, constipation, diarrhea and vomiting.   Genitourinary:  Negative for dysuria and hematuria.  "  Musculoskeletal:  Negative for arthralgias, back pain and myalgias.   Skin:  Positive for rash. Negative for color change.   Neurological:  Negative for dizziness, seizures, syncope and headaches.   All other systems reviewed and are negative.      Objective     /70 (BP Location: Left arm, Patient Position: Sitting, Cuff Size: Large)   Pulse 64   Temp 98 °F (36.7 °C) (Tympanic)   Resp 16   Ht 5' 6\" (1.676 m)   Wt 103 kg (227 lb 6.4 oz)   SpO2 96%   BMI 36.70 kg/m²     Physical Exam  Vitals and nursing note reviewed.   Constitutional:       General: She is not in acute distress.     Appearance: She is well-developed.   HENT:      Head: Normocephalic and atraumatic.      Right Ear: Tympanic membrane normal.      Left Ear: Tympanic membrane normal.      Nose: Nose normal.      Mouth/Throat:      Mouth: Mucous membranes are moist.      Pharynx: Oropharynx is clear. No oropharyngeal exudate.   Eyes:      Extraocular Movements: Extraocular movements intact.      Conjunctiva/sclera: Conjunctivae normal.   Cardiovascular:      Rate and Rhythm: Normal rate and regular rhythm.      Heart sounds: Normal heart sounds. No murmur heard.     No friction rub. No gallop.   Pulmonary:      Effort: Pulmonary effort is normal. No respiratory distress.      Breath sounds: Normal breath sounds. No wheezing, rhonchi or rales.   Abdominal:      Palpations: Abdomen is soft.      Tenderness: There is no abdominal tenderness.   Musculoskeletal:         General: No swelling.      Cervical back: Neck supple.   Feet:      Right foot:      Skin integrity: No ulcer, skin breakdown, erythema, warmth, callus or dry skin.      Left foot:      Skin integrity: No ulcer, skin breakdown, erythema, warmth, callus or dry skin.   Skin:     General: Skin is warm and dry.      Capillary Refill: Capillary refill takes less than 2 seconds.      Findings: Rash present. Rash is macular and papular. Rash is not crusting, pustular, scaling, " urticarial or vesicular.      Comments: Maculopapular rash on the patient's chest and superior breasts bilaterally.  No vesicles or purulence or drainage.  No rash beneath the breasts.   Neurological:      General: No focal deficit present.      Mental Status: She is alert.   Psychiatric:         Mood and Affect: Mood normal.       Administrative Statements         Diabetic Foot Exam    Patient's shoes and socks removed.    Right Foot/Ankle   Right Foot Inspection  Skin Exam: skin normal. No dry skin, no warmth, no callus, no erythema, no maceration, no abnormal color, no pre-ulcer, no ulcer and no callus.     Left Foot/Ankle  Left Foot Inspection  Skin Exam: skin normal. No dry skin, no warmth, no erythema, no maceration, normal color, no pre-ulcer, no ulcer and no callus.

## 2024-08-19 ENCOUNTER — HOSPITAL ENCOUNTER (OUTPATIENT)
Facility: CLINIC | Age: 64
Discharge: HOME | End: 2024-08-19
Payer: COMMERCIAL

## 2024-08-19 DIAGNOSIS — G47.33 OSA (OBSTRUCTIVE SLEEP APNEA): ICD-10-CM

## 2024-08-19 DIAGNOSIS — E66.9 OBESITY (BMI 30-39.9): ICD-10-CM

## 2024-08-19 PROCEDURE — G0399 HOME SLEEP TEST/TYPE 3 PORTA: HCPCS

## 2024-08-20 PROBLEM — G47.33 OSA (OBSTRUCTIVE SLEEP APNEA): Status: ACTIVE | Noted: 2024-08-20

## 2024-08-20 NOTE — PROGRESS NOTES
Home Sleep Study Documentation    HOME STUDY DEVICE: Noxturnal yes                                           Autumn G3 no      Pre-Sleep Home Study:    Set-up and instructions performed by: T Tech    Technician performed demonstration for Patient: yes    Return demonstration performed by Patient: yes    Written instructions provided to Patient: yes    Patient signed consent form: yes        Post-Sleep Home Study:    Additional comments by Patient: None    Home Sleep Study Failed:no:    Failure reason: N/A    Reported or Detected: N/A    Scored by: CECILIA Bolaños

## 2024-08-22 PROCEDURE — 95806 SLEEP STUDY UNATT&RESP EFFT: CPT | Performed by: INTERNAL MEDICINE

## 2024-09-06 LAB
LEFT EYE DIABETIC RETINOPATHY: NORMAL
RIGHT EYE DIABETIC RETINOPATHY: NORMAL

## 2024-09-09 ENCOUNTER — TELEPHONE (OUTPATIENT)
Dept: OBGYN CLINIC | Facility: HOSPITAL | Age: 64
End: 2024-09-09

## 2024-09-09 NOTE — ASSESSMENT & PLAN NOTE
Lab Results   Component Value Date    HGBA1C 6.1 02/08/2024   [place].  On Ozempic 0.5 mg weekly.  Will check an updated A1c.  Recommended diet low in carbohydrates and sugars.

## 2024-09-09 NOTE — ASSESSMENT & PLAN NOTE
Recommend repeating thyroid labs for medication monitoring.  Continue levothyroxine 100 mcg daily and liothyronine 25 mcg every other day.

## 2024-09-09 NOTE — PROGRESS NOTES
Ambulatory Visit  Name: Katlyn Frye      : 1960      MRN: [id number]  Encounter Provider: Maty Landaverde PA-C  Encounter Date: 9/10/2024   Encounter department: [facility] PRIMARY CARE [place]    Assessment & Plan   1. Type 2 diabetes mellitus with microalbuminuria, without long-term current use of insulin (HCC)  Assessment & Plan:    Lab Results   Component Value Date    HGBA1C 6.1 2024   Patient follows with endocrinology in the [facility].  On Ozempic 0.5 mg weekly.  Will check an updated A1c.  Recommended diet low in carbohydrates and sugars.  Orders:  -     Hemoglobin A1C; Future  2. Acquired hypothyroidism  Assessment & Plan:  Recommend repeating thyroid labs for medication monitoring.  Continue levothyroxine 100 mcg daily and liothyronine 25 mcg every other day.  Orders:  -     TSH, 3rd generation with Free T4 reflex  3. Tardive dyskinesia  Assessment & Plan:  Continue Ingrezza and routine follow-up with psychiatry.  4. Bipolar 1 disorder, depressed, severe (HCC)  Assessment & Plan:  Continue current treatment regimen and regular follow-up with psychiatry once monthly.  5. Stage 2 chronic kidney disease  Assessment & Plan:  Lab Results   Component Value Date    EGFR 72 2024    EGFR 69 2024    EGFR 75 2024    CREATININE 0.89 2024    CREATININE 0.88 2024    CREATININE 0.86 2024   Stable.  6. History of nephrectomy  Assessment & Plan:  Right kidney removed after her right ureter was nicked during hysterectomy in .       History of Present Illness     Katlyn is a 63-year-old female presenting for follow-up.  No acute complaints or concerns today.  She follows with endocrinologist in [place] for her diabetes and is on Ozempic 0.5 mg weekly.  She used to be on metformin, but this was switched to Ozempic.  She denies any side effects to metformin.  She states her diet has not been as good recently and she expects her sugars and A1c to be elevated.  She has  "CGM and her fasting sugar was 117 this morning and blood sugar was 180 last night before bed.  She is on levothyroxine and liothyronine for hypothyroidism.  Her endocrinologist prescribes her liothyronine.  She saw hematologist recently for persistently elevated blood counts.  They are continuing to monitor this for now.          Review of Systems   Constitutional:  Negative for chills and fever.   HENT:  Negative for congestion, ear pain and sore throat.    Eyes:  Negative for pain and visual disturbance.   Respiratory:  Negative for cough and shortness of breath.    Cardiovascular:  Negative for chest pain and palpitations.   Gastrointestinal:  Negative for abdominal pain, constipation, diarrhea and vomiting.   Genitourinary:  Negative for dysuria and hematuria.   Musculoskeletal:  Negative for arthralgias, back pain and myalgias.   Skin:  Negative for color change and rash.   Neurological:  Negative for dizziness, seizures, syncope and headaches.   All other systems reviewed and are negative.      Objective     BP 98/68 (BP Location: Right arm)   Pulse 73   Temp 98.5 °F (36.9 °C) (Tympanic)   Ht 5' 4.5\" (1.638 m)   Wt 12.6 kg (27 lb 12.8 oz)   SpO2 97%   BMI 4.70 kg/m²     Physical Exam  Vitals and nursing note reviewed.   Constitutional:       General: She is not in acute distress.     Appearance: She is well-developed.   HENT:      Head: Normocephalic and atraumatic.      Right Ear: Tympanic membrane normal.      Left Ear: Tympanic membrane normal.      Nose: Nose normal.      Mouth/Throat:      Mouth: Mucous membranes are moist.      Pharynx: Oropharynx is clear. No oropharyngeal exudate.   Eyes:      Extraocular Movements: Extraocular movements intact.      Conjunctiva/sclera: Conjunctivae normal.   Cardiovascular:      Rate and Rhythm: Normal rate and regular rhythm.      Heart sounds: Normal heart sounds. No murmur heard.     No friction rub. No gallop.   Pulmonary:      Effort: Pulmonary effort is " normal. No respiratory distress.      Breath sounds: Normal breath sounds. No wheezing, rhonchi or rales.   Musculoskeletal:         General: No swelling.      Cervical back: Neck supple.   Skin:     General: Skin is warm and dry.      Capillary Refill: Capillary refill takes less than 2 seconds.   Neurological:      General: No focal deficit present.      Mental Status: She is alert.   Psychiatric:         Mood and Affect: Mood normal.       Administrative Statements

## 2024-09-10 ENCOUNTER — TELEPHONE (OUTPATIENT)
Dept: ADMINISTRATIVE | Facility: OTHER | Age: 64
End: 2024-09-10

## 2024-09-10 ENCOUNTER — OFFICE VISIT (OUTPATIENT)
Age: 64
End: 2024-09-10
Payer: MEDICARE

## 2024-09-10 VITALS
BODY MASS INDEX: 4.63 KG/M2 | DIASTOLIC BLOOD PRESSURE: 68 MMHG | WEIGHT: 27.8 LBS | HEART RATE: 73 BPM | HEIGHT: 65 IN | OXYGEN SATURATION: 97 % | TEMPERATURE: 98.5 F | SYSTOLIC BLOOD PRESSURE: 98 MMHG

## 2024-09-10 DIAGNOSIS — F31.4 BIPOLAR 1 DISORDER, DEPRESSED, SEVERE (HCC): ICD-10-CM

## 2024-09-10 DIAGNOSIS — G24.01 TARDIVE DYSKINESIA: ICD-10-CM

## 2024-09-10 DIAGNOSIS — N18.2 STAGE 2 CHRONIC KIDNEY DISEASE: ICD-10-CM

## 2024-09-10 DIAGNOSIS — R80.9 TYPE 2 DIABETES MELLITUS WITH MICROALBUMINURIA, WITHOUT LONG-TERM CURRENT USE OF INSULIN (HCC): Primary | ICD-10-CM

## 2024-09-10 DIAGNOSIS — E11.29 TYPE 2 DIABETES MELLITUS WITH MICROALBUMINURIA, WITHOUT LONG-TERM CURRENT USE OF INSULIN (HCC): Primary | ICD-10-CM

## 2024-09-10 DIAGNOSIS — Z90.5 HISTORY OF NEPHRECTOMY: ICD-10-CM

## 2024-09-10 DIAGNOSIS — E03.9 ACQUIRED HYPOTHYROIDISM: ICD-10-CM

## 2024-09-10 PROCEDURE — G2211 COMPLEX E/M VISIT ADD ON: HCPCS

## 2024-09-10 PROCEDURE — 99214 OFFICE O/P EST MOD 30 MIN: CPT

## 2024-09-10 NOTE — ASSESSMENT & PLAN NOTE
Lab Results   Component Value Date    EGFR 72 08/01/2024    EGFR 69 06/13/2024    EGFR 75 04/11/2024    CREATININE 0.89 08/01/2024    CREATININE 0.88 06/13/2024    CREATININE 0.86 04/11/2024   Stable.

## 2024-09-10 NOTE — TELEPHONE ENCOUNTER
----- Message from Esthela CASEY sent at 9/10/2024 10:52 AM EDT -----  Regarding: Diabetic Eye Exam  09/10/24 10:52 AM    Hello, our patient Katlyn Frye has had Diabetic Eye Exam completed/performed. Please assist in updating the patient chart by making an External outreach to [facility] facility located in [place], [place]. The date of service is Friday 09/06/2024.    Thank you,  Esthela Chen MA   PRIMARY CARE [place]

## 2024-09-10 NOTE — LETTER
Diabetic Eye Exam Form    Date Requested: 09/10/24  Patient: Katlyn Frye  Patient : 1960   Referring Provider: Ssuie Lamb DO      DIABETIC Eye Exam Date _______________________________      Type of Exam MUST be documented for Diabetic Eye Exams. Please CHECK ONE.     Retinal Exam       Dilated Retinal Exam       OCT       Optomap-Iris Exam      Fundus Photography       Left Eye - Please check Retinopathy or No Retinopathy        Exam did show retinopathy    Exam did not show retinopathy       Right Eye - Please check Retinopathy or No Retinopathy       Exam did show retinopathy    Exam did not show retinopathy       Comments __________________________________________________________    Practice Providing Exam ______________________________________________    Exam Performed By (print name) _______________________________________      Provider Signature ___________________________________________________      These reports are needed for  compliance.  Please fax this completed form and a copy of the Diabetic Eye Exam report to our office located at [address] [place] [address] as soon as possible via Fax [phone] attention Ester: Phone [phone]  We thank you for your assistance in treating our mutual patient.

## 2024-09-10 NOTE — TELEPHONE ENCOUNTER
Upon review of the In Basket request and the patient's chart, initial outreach has been made via fax to facility. Please see Contacts section for details.     Thank you  Ester Hamilton MA

## 2024-09-11 NOTE — TELEPHONE ENCOUNTER
Upon review of the In Basket request we were able to locate, review, and update the patient chart as requested for Diabetic Eye Exam.    Any additional questions or concerns should be emailed to the Practice Liaisons via the appropriate education email address, please do not reply via In Basket.    Thank you  Ester Hamilton MA   [facility]

## 2024-09-12 ENCOUNTER — NURSE TRIAGE (OUTPATIENT)
Age: 64
End: 2024-09-12

## 2024-09-12 NOTE — TELEPHONE ENCOUNTER
"SPOKE WITH [facility], HX ABDOMINAL DISCOMFORT, FATTY LIVER. LAST OV 5/16/24. PT REPORTS RECENT INCREASED DOSE AMANTADINE, CAUSING CONSTIPATION. NO BM FOR 3 DAYS, TOOK  DULCOLAX WITH GOOD RESULT. PT ADVISED INCREASED HYDRATION, HIGH FIBER DIET, FIBER SUPPLEMENT, CAN TRIAL MIRALAX 1-2 TIMES DAILY AND TITRATE TO DESIRED BM PATTERN. CALL BACK IF SYMPTOMS PERSIST. ANY ADDITIONAL RECOMMENDATIONS?            Reason for Disposition   MILD constipation    Answer Assessment - Initial Assessment Questions  1. STOOL PATTERN OR FREQUENCY: \"How often do you pass bowel movements (BMs)?\"  (Normal range: tid to q 3 days)  \"When was the last BM passed?\"        NO BM FOR 3 DAYS  2. STRAINING: \"Do you have to strain to have a BM?\"       YES  3. RECTAL PAIN: \"Does your rectum hurt when the stool comes out?\" If Yes, ask: \"Do you have hemorrhoids? How bad is the pain?\"  (Scale 1-10; or mild, moderate, severe)      DENIES  4. STOOL COMPOSITION: \"Are the stools hard?\"       DENIES  5. BLOOD ON STOOLS: \"Has there been any blood on the toilet tissue or on the surface of the BM?\" If Yes, ask: \"When was the last time?\"       DENIES  6. CHRONIC CONSTIPATION: \"Is this a new problem for you?\"  If no, ask: \"How long have you had this problem?\" (days, weeks, months)       DENIES  7. CHANGES IN DIET OR HYDRATION: \"Have there been any recent changes in your diet?\" \"How much fluids are you drinking consuming on a daily basis?\"  \"How much have you had to drink today?\"      DENIES  8. MEDICATIONS: \"Have you been taking any new medications?\" \"Are you taking any narcotic pain medications?\" (e.g., Vicoden, Percocet, morphine, dilaudid)      RECENT INCREASE DOSE AMANTADINE  9. LAXATIVES: \"Have you been using any stool softeners, laxatives, or enemas?\"  If yes, ask \"What, how often, and when was the last time?\"  DULCOLAX  10.ACTIVITY:  \"How much walking do you do every day? on a daily basis?\"  \"Has your activity level decreased in the past week?\"       " "  DENIES  11. CAUSE: \"What do you think is causing the constipation?\"         INCREASED DOSE AMANTADINE  12. OTHER SYMPTOMS: \"Do you have any other symptoms?\" (e.g., abdominal pain, bloating, fever, vomiting)        DENIES    Protocols used: Constipation-ADULT-[place]    "

## 2024-09-13 NOTE — TELEPHONE ENCOUNTER
Pt. Called back, pt. Taking metamucil with no BM in the last 2 days, asked if she started Miralax as suggested but denied being told, reviewed Miralax 1-2 times daily until desired BM pattern with full evacuation, pt. Will hold off on dulcolax unless not getting enough relief with Miralax, advised if she develops diarrhea to cut back on Miralax as she can titrate medication, advised to increase water for better effectiveness, pt. Is not eating a lot so advise she may not have Bm's daily if she's not eating, pt. Will call back next week if symptoms do not improve despite recommendations, advised pt to give it a week, pt. Agreeable, no further review needed

## 2024-09-16 ENCOUNTER — TELEPHONE (OUTPATIENT)
Dept: NEUROLOGY | Facility: CLINIC | Age: 64
End: 2024-09-16

## 2024-09-16 NOTE — TELEPHONE ENCOUNTER
Called patient to schedule with Dr Huerta.  She said she saw Dr Amin and does not feel there is anything wrong with her. She declined making an appointment.   I told her she may call back if there ever is a need.  She agreed.

## 2024-10-02 ENCOUNTER — OFFICE VISIT (OUTPATIENT)
Age: 64
End: 2024-10-02
Payer: MEDICARE

## 2024-10-02 VITALS
WEIGHT: 229.6 LBS | SYSTOLIC BLOOD PRESSURE: 116 MMHG | TEMPERATURE: 98 F | OXYGEN SATURATION: 96 % | HEART RATE: 65 BPM | HEIGHT: 66 IN | DIASTOLIC BLOOD PRESSURE: 80 MMHG | BODY MASS INDEX: 36.9 KG/M2

## 2024-10-02 DIAGNOSIS — E66.9 OBESITY (BMI 30-39.9): ICD-10-CM

## 2024-10-02 DIAGNOSIS — G47.33 OSA (OBSTRUCTIVE SLEEP APNEA): Primary | ICD-10-CM

## 2024-10-02 PROBLEM — E66.01 MORBID OBESITY WITH BMI OF 40.0-44.9, ADULT (HCC): Status: ACTIVE | Noted: 2024-10-02

## 2024-10-02 PROCEDURE — 99214 OFFICE O/P EST MOD 30 MIN: CPT | Performed by: INTERNAL MEDICINE

## 2024-10-02 PROCEDURE — G2211 COMPLEX E/M VISIT ADD ON: HCPCS | Performed by: INTERNAL MEDICINE

## 2024-10-02 NOTE — PROGRESS NOTES
"Assessment/Plan:   Diagnoses and all orders for this visit:    NYLA (obstructive sleep apnea)  -     Mandible Advancing Appliance    Obesity (BMI 30-39.9)    Mild obstructive sleep apnea with an AHI of 7.0 associated with events related hypoxemia  Etiology pathogenesis of obstructive sleep apnea discussed in detail  Consequences of untreated sleep apnea discussed with excessive daytime sleepiness, increased risk for myocardial infarction stroke atrial fibrillation discussed  Treatment options for the mild obstructive sleep apnea discussed with mandibular advancing appliance and PAP machine were discussed along with weight loss  She would like to have the mandibular advancing appliance which has been ordered prescription given also given the names of the doctors for the orthodontist she will follow-up with whichever one her insurance is accepted and also discussed that she would need a repeat home sleep study done with the mandibular advancing appliance in place to look for resolution of the apneas understands and verbalizes  Caution against driving when sleepy.  Recommend weight loss  Follow-up in 1 year or earlier as needed    No follow-ups on file.  All questions are answered to the patient's satisfaction and understanding.  She verbalizes understanding.  She is encouraged to call with any further questions or concerns.    Portions of the record may have been created with voice recognition software.  Occasional wrong word or \"sound a like\" substitutions may have occurred due to the inherent limitations of voice recognition software.  Read the chart carefully and recognize, using context, where substitutions have occurred.    Electronically Signed by Hien Garcia MD    ______________________________________________________________________    Chief Complaint:   Chief Complaint   Patient presents with    Sleep Apnea    Follow-up       Patient ID: Katlyn is a 64 y.o. y.o. female has a past medical history of " Arthritis, Asthma (All my life; pretty mild these days), Bipolar 2 disorder (HCC) (1990), Chronic bronchitis (HCC), Chronic kidney disease, Depression, Diabetes mellitus (HCC), Disease of thyroid gland (2009), GERD (gastroesophageal reflux disease), Headache(784.0) (1995), Pneumonia (2020), Shingles (2009), Sleep apnea, obstructive, and Vertigo.    10/2/2024  Patient presents today for follow-up visit.  Patient is here for follow-up after recent home sleep study.  Her daily sleep schedule has not changed since her last visit.        Review of Systems   Constitutional:  Positive for fatigue.   HENT: Negative.     Eyes: Negative.    Respiratory: Negative.          History of loud snoring witnessed apneic spells occasional choking and gasping for air.   Cardiovascular: Negative.    Gastrointestinal: Negative.    Endocrine: Negative.    Genitourinary: Negative.    Musculoskeletal: Negative.    Allergic/Immunologic: Negative.    Neurological: Negative.    Psychiatric/Behavioral: Negative.         Smoking history: She reports that she has never smoked. She has never been exposed to tobacco smoke. She has never used smokeless tobacco.    The following portions of the patient's history were reviewed and updated as appropriate: allergies, current medications, past family history, past medical history, past social history, past surgical history, and problem list.    Immunization History   Administered Date(s) Administered    COVID-19 Pfizer mRNA vacc PF julienne-sucrose 12 yr and older (Comirnaty) 10/14/2023    INFLUENZA 10/14/2023    Influenza Injectable, MDCK, Preservative Free, Quadrivalent, 0.5 mL 01/12/2019, 11/30/2019    Pneumococcal Conjugate 13-Valent 01/12/2019    Pneumococcal Conjugate Vaccine 20-valent (Pcv20), Polysace 10/06/2023    Respiratory Syncytial Virus Vaccine (Recombinant, Adjuvanted) 10/06/2023    Zoster Vaccine Recombinant 11/30/2019     Current Outpatient Medications   Medication Sig Dispense Refill     "albuterol (Ventolin HFA) 90 mcg/act inhaler Inhale 2 puffs every 6 (six) hours as needed for wheezing 18 g 5    Amantadine HCl 100 MG tablet Take 100 mg by mouth 3 (three) times a day 9/10/24-Pt taking 4 times daily      clonazePAM (KlonoPIN) 1 mg tablet Take 1 mg by mouth 2 (two) times a day      ELDERBERRY PO Take by mouth      ergocalciferol (VITAMIN D2) 50,000 units       folic acid (FOLVITE) 400 mcg tablet Take 1 tablet (400 mcg total) by mouth daily 90 tablet 1    gabapentin (NEURONTIN) 300 mg capsule Take 300 mg by mouth 3 (three) times a day      Ingrezza 80 MG CAPS 80 mg      levothyroxine 100 mcg tablet Take 1 tablet (100 mcg total) by mouth daily 90 tablet 1    liothyronine (CYTOMEL) 25 mcg tablet Take 25 mcg by mouth every other day      lithium carbonate (LITHOBID) 300 mg CR tablet TAKE 3 TABLETS BY ORAL ROUTE AT BEDTIME      Ozempic, 0.25 or 0.5 MG/DOSE, 2 MG/3ML injection pen       SUMAtriptan (IMITREX) 50 mg tablet Take 50 mg by mouth daily as needed      traZODone (DESYREL) 50 mg tablet Take 50 mg by mouth daily at bedtime      Vraylar 1.5 MG capsule Take 1.5 mg by mouth daily      clindamycin 1 % gel       pyridoxine (VITAMIN B6) 100 mg tablet Take 100 mg by mouth daily (Patient not taking: Reported on 9/10/2024)      triamcinolone (KENALOG) 0.5 % cream Apply topically 3 (three) times a day 15 g 1     No current facility-administered medications for this visit.     Allergies: Iv contrast [iodinated contrast media], Lamictal [lamotrigine], Latuda [lurasidone], Levaquin [levofloxacin], Dulaglutide, Benztropine, Brexpiprazole, Escitalopram, Food, and Lumateperone    Objective:  Vitals:    10/02/24 1405   BP: 116/80   Pulse: 65   Temp: 98 °F (36.7 °C)   SpO2: 96%   Weight: 104 kg (229 lb 9.6 oz)   Height: 5' 6\" (1.676 m)   Oxygen Therapy  SpO2: 96 %  .  Wt Readings from Last 3 Encounters:   10/02/24 104 kg (229 lb 9.6 oz)   09/10/24 12.6 kg (27 lb 12.8 oz)   08/07/24 103 kg (227 lb 6.4 oz)     Body " mass index is 37.06 kg/m².    Physical Exam  Vitals and nursing note reviewed.   Constitutional:       Appearance: She is well-developed.   HENT:      Head: Normocephalic and atraumatic.      Mouth/Throat:      Comments: Crowded oropharyngeal airways  Eyes:      Conjunctiva/sclera: Conjunctivae normal.      Pupils: Pupils are equal, round, and reactive to light.   Neck:      Thyroid: No thyromegaly.      Vascular: No JVD.   Cardiovascular:      Rate and Rhythm: Normal rate and regular rhythm.      Heart sounds: Normal heart sounds. No murmur heard.     No friction rub. No gallop.   Pulmonary:      Effort: Pulmonary effort is normal. No respiratory distress.      Breath sounds: Normal breath sounds. No wheezing or rales.   Chest:      Chest wall: No tenderness.   Musculoskeletal:         General: No tenderness or deformity. Normal range of motion.      Cervical back: Normal range of motion and neck supple.   Lymphadenopathy:      Cervical: No cervical adenopathy.   Skin:     General: Skin is warm and dry.   Neurological:      Mental Status: She is alert and oriented to person, place, and time.         Lab Review:   Telephone on 09/10/2024   Component Date Value    Right Eye Diabetic Retin* 09/06/2024 None     Left Eye Diabetic Retino* 09/06/2024 None    Appointment on 06/24/2024   Component Date Value    Copper 06/24/2024 104     Ceruloplasmin 06/24/2024 22.0     Vitamin B1, Whole Blood 06/24/2024 98.7     Vitamin B6 06/24/2024 51.4     Vitamin B-12 06/24/2024 1,304 (H)     Folate 06/24/2024 5.6 (L)    Appointment on 06/13/2024   Component Date Value    Sodium 06/13/2024 139     Potassium 06/13/2024 4.3     Chloride 06/13/2024 106     CO2 06/13/2024 27     ANION GAP 06/13/2024 6     BUN 06/13/2024 9     Creatinine 06/13/2024 0.88     Glucose, Fasting 06/13/2024 159 (H)     Calcium 06/13/2024 9.4     eGFR 06/13/2024 69    Appointment on 04/04/2024   Component Date Value    IgA 04/04/2024 439 (H)     Gliadin IgA  04/04/2024 6     Gliadin IgG 04/04/2024 4     Tissue Transglut Ab IGG 04/04/2024 2     TISSUE TRANSGLUTAMINASE * 04/04/2024 <2     Endomysial IgA 04/04/2024 Negative        Diagnostics:    Home sleep study 8/20/2024 significant for mild obstructive sleep apnea with an AHI of 7.0  ESS: Total score: 0

## 2024-11-12 ENCOUNTER — TELEPHONE (OUTPATIENT)
Age: 64
End: 2024-11-12

## 2024-11-12 NOTE — TELEPHONE ENCOUNTER
Pt is calling asking for advice on a good orthodontist to go to in the [place] area. She states she was provided a list but majority of them where in [place] and she states that is far to travel. Please call patient with any recommendations.

## 2024-11-13 ENCOUNTER — TELEPHONE (OUTPATIENT)
Age: 64
End: 2024-11-13

## 2024-11-13 NOTE — TELEPHONE ENCOUNTER
Pt called in requesting information on sleep guards and follow up with sleep doctor. Triage nurse gave pt the contact number for their office. Nothing further to note.

## 2024-11-13 NOTE — TELEPHONE ENCOUNTER
We are actually giving the list of orthodontist given by you raphael and that is mainly in [place] and [place] for the mandible advancing appliance   would appreciate if you can give us the names of the orthodontist in these areas in [facility]  We do have the name of one of the orthodontist in [place] which our medical assistant Raffi has given to the patient but would appreciate if you raphael officially do have a list of orthodontist in this area to make the mandible advancing appliance and if you can share thank you

## 2024-11-13 NOTE — TELEPHONE ENCOUNTER
Patient left message on the sleep center nurse line:  My name is Katlyn Frye, my birthday is 1960 and my phone number is [phone]. I'm trying to find the names of orthodontists in the [facility] area. My doctor, Doctor Mani, has said she'd send them to me, but I've never received the names. So I'm looking for those names so I can get a mouth guard. I need an orthodontist who will make a mouth guard for me. If you can help me, please call me back. Thank you.    Forwarded to Dr. Schwab's office.

## 2024-11-15 DIAGNOSIS — R26.89 BALANCE DISORDER: Primary | ICD-10-CM

## 2024-11-18 ENCOUNTER — TELEPHONE (OUTPATIENT)
Age: 64
End: 2024-11-18

## 2024-11-18 NOTE — TELEPHONE ENCOUNTER
Inbound call received from patient.    Patient reported that [facility] out patient PT is full and is requesting that PT referral be faxed to [facility] at [phone] (f)    PT referral dated 11/15/24 faxed to above facility.

## 2024-11-25 ENCOUNTER — NURSE TRIAGE (OUTPATIENT)
Age: 64
End: 2024-11-25

## 2024-11-25 NOTE — TELEPHONE ENCOUNTER
"Pt informed she does everything she can to try not to do the shuffling gait, she feels she is walking fine, just very exhausted after just walking for 15 mins. She has a difficult time trying to get up and start walking, but then can gather herself, it just takes her time. Pt denies any falls     I advised pt to go to the ED, pt is reluctant as she does not want to have to wait 5 hours to be seen (pt typically goes to [facility] as its only 1 mile away, [facility] is 8 miles from pts home). I did inform we have no control over her wait time to be evaluated as the ER triages their pts that come in, but I did apologize for that inconvenience for her as I understand it can be frusterating. She prefers I reach out to Dr Amin to see what her advisement is, as she is felix to see Dr Huerta for the Tremors 5/7/25 and on waitlist.  Pt concern is the new symptoms of weakness from hips to her feet, ambulatory dysfunction and the drooling that started and pt  will proceed only per Provider's advisement.     # [phone], may leave a detailed msg.    Patient aware if symptoms worsen to call our office or go to the nearest ED.     Reason for Disposition   Patient sounds very sick or weak to the triager    Additional Information   Weakness of the face, arm or leg on one side of the body    Answer Assessment - Initial Assessment Questions  1. DESCRIPTION: \"Describe how you are feeling.\"      Biggest concern today is her Weakness from hips down for the past 3 weeks, tremors have been going on for over 1 year, they just increased worsened. Pt has shuffling gait.   2. SEVERITY: \"How bad is it?\"  \"Can you stand and walk?\"      Pt is able to stand and walk. Pt has a hard time walking the dog for 15 mins. She can walk, but after 15 mins of walking her small dog, she is very exhausted.   3. ONSET: \"When did these symptoms begin?\" (e.g., hours, days, weeks, months)       2-3 weeks  4. CAUSE: \"What do you think is causing the " "weakness or fatigue?\" (e.g., not drinking enough fluids, medical problem, trouble sleeping)      Pt psychiatrist feels her lithium and ingrezza may have contributed. That provider lowered both doases of meds, but pt still has not seen that much improvement from lowering those doses, only seen very slight improvement.   5. NEW MEDICINES:  \"Have you started on any new medicines recently?\" (e.g., opioid pain medicines, benzodiazepines, muscle relaxants, antidepressants, antihistamines, neuroleptics, beta blockers)      Have not started any new meds  6. OTHER SYMPTOMS: \"Do you have any other symptoms?\" (e.g., chest pain, fever, cough, SOB, vomiting, diarrhea, bleeding, other areas of pain)      Only other symptom she noticed is drooling, mostly at nighttime, but it may happened through out the day , when she gets tired she does not notice her drooling as much. Pt does not nap during the day, even if she is very exhausted.    Protocols used: Weakness (Generalized) and Fatigue-Adult-OH, Neurologic Deficit-Adult-OH    "

## 2024-11-25 NOTE — TELEPHONE ENCOUNTER
SINA Malagon        Agree, patient should be seen in the ER for new onset of symptoms. Dr. LY saw this patient once 6 months ago and she did not have the symptoms described in this triage. Any abrupt new onset of weakness, ambulatory dysfunction and drooling should be evaluated acutely in the ER.         ______________________________    I spoke to patient and update her per provider's recommendation per request. Pt understood and she will go to ED now. She did ask if I can advise which Dr she should see in ED. I informed she would be evaluated by an ER provider and they would determine per her evaluation and symptoms their recommendation at that time, as I can not advise that as she would need to be evaluated by ER provider first. She understood and  has no further questions at this time.

## 2024-11-25 NOTE — TELEPHONE ENCOUNTER
Agree, patient should be seen in the ER for new onset of symptoms. Dr. LY saw this patient once 6 months ago and she did not have the symptoms described in this triage. Any abrupt new onset of weakness, ambulatory dysfunction and drooling should be evaluated acutely in the ER.

## 2024-12-03 ENCOUNTER — TELEPHONE (OUTPATIENT)
Dept: NEPHROLOGY | Facility: CLINIC | Age: 64
End: 2024-12-03

## 2024-12-03 NOTE — TELEPHONE ENCOUNTER
Called left voice message to remind patient to get non-fasting labs done 1 week prior to 12/17/24 scheduled follow-up appointment with Britney Tay NP. also advised as a reminder labs / testing will need to be done in the appropriate time for your scheduled appointment.

## 2024-12-09 NOTE — PROGRESS NOTES
NEPHROLOGY OFFICE NOTE    Patient: Katlyn Frye               Sex: female           YOB: 1960        Age:  64 y.o.       12/17/2024    ASSESSMENT/PLAN     Assessment & Plan  Stage 2 chronic kidney disease  Lab Results   Component Value Date    EGFR 72 12/16/2024    EGFR 75 12/09/2024    EGFR 66 11/07/2024    CREATININE 0.85 12/16/2024    CREATININE 0.86 12/09/2024    CREATININE 0.96 11/07/2024       Orders:    lisinopril (ZESTRIL) 2.5 mg tablet; Take 1 tablet (2.5 mg total) by mouth daily    Basic metabolic panel; Future    Basic metabolic panel; Future    Urinalysis with microscopic; Future    Albumin / creatinine urine ratio; Future    Patient underwent right total nephrectomy 2009, most recent CT imaging on 3/26/224 showed no structural abnormality of the left kidney apart from a 3 mm nonobstructing intrarenal calculus. After review of the medical record, it appears baseline creatinine levels fluctuate around 0.8.      Etiology of underlying CKD multifactorial and suspected back in [place] to reduced nephron mass following total nephrectomy and diabetic nephropathy with documented microalbuminuria that has been progressively worsening.      Patient is taking lithium for management of bipolar disorder and depression, her psychiatrist is closely monitoring medications and no evidence of change in renal function or hyponatremia on most recent labs.    Current creatinine level 0.85 with an eGFR of 72.  Advised the importance of staying hydrated with up to 64 ounces of water daily and avoidance of nephrotoxic agents such as NSAIDs.  Will repeat BMP and urine studies prior to follow-up.  Microalbuminuria    Orders:    lisinopril (ZESTRIL) 2.5 mg tablet; Take 1 tablet (2.5 mg total) by mouth daily    Urinalysis with microscopic; Future    Albumin / creatinine urine ratio; Future    Suspect etiology may be secondary to diabetic nephropathy. Most recent urinalysis showed 1+ protein with no significant  hematuria. Urine microalbumin to creatinine ratio elevated at 164.     Patient has a history of orthostatic hypotension but in the interim blood pressure readings have been stable.  In the setting of worsening microalbuminuria would recommend initiation of lisinopril 2.5 mg once daily for further management.    As lisinopril can impact lithium levels in the blood I advised her to inform her prescribing physician to inform them of this medication change that lithium levels can be closely monitored.  She reports that her last reading was within normal range.    Will check BMP within 2 weeks to monitor for renal function stability.  Type 2 diabetes mellitus with microalbuminuria, without long-term current use of insulin ([facility])    Lab Results   Component Value Date    HGBA1C 6.1 02/08/2024       Orders:    Basic metabolic panel; Future    Urinalysis with microscopic; Future    Albumin / creatinine urine ratio; Future    Currently maintained on Ozempic. Most recent hemoglobin A1C was 6.1.  Discussed importance of a glycemic control in the setting of underlying CKD and recommend goal hemoglobin maintained below 7.0.  Will initiate lisinopril as above for management of microalbuminuria.    BACKGROUND     I had the pleasure of seeing Katlyn Frye in the nephrology office today for follow-up. She has a past medical history significant for type 2 diabetes, Bipolar disorder, hypothyroidism, and depression.      She has underlying Bipolar disorder and depression, managed on multiple medications including trazodone, clonazepam, and lithium.  Most recent lithium level was in normal limits at 0.7.  She reports that her psychiatrist is closely monitoring her medications and no evidence of hyponatremia or renal instability on most recent labs.  She had a recent issue with taking too much lithium (taking 900 mg when should have been taking 300 mg).  She reports that her prescribing physician is monitoring this closely and her last  "reading was within normal range.     She has underlying osteoarthritis and following with Orthopedics for management of knee pain.      She had undergone right nephrectomy in the past.  Reports that she underwent total abdominal hysterectomy in 2009 and there was an injury to her right kidney.  She reports that she was told that the kidney \"shriveled up\" and had to be removed.     She has underlying type 2 diabetes and is maintained on Ozempic.      Patient has underlying orthostatic hypotension, not actively monitoring blood pressures at home.  Reports she is concerned that some of her medications may be contributing to her episodes of low blood pressure.  She is on sedating medications such as Klonopin and trazodone, which may impact blood pressure.  Advise close follow-up with her psychologist who is managing these medications.     In the interim, she was evaluated by Hematology for neutrophilia and is undergoing work-up and monitoring.      She was diagnosed with COVID-19 long hauler manifesting with chronic muscle pain and has been working with physical therapy.     The last blood work was done on 12/16/2024, which we have reviewed together.    SUBJECTIVE     She currently has no major complaints at this time and is feeling well.    REVIEW OF SYSTEMS     Review of Systems   Constitutional:  Negative for activity change, chills and fever.   HENT:  Negative for trouble swallowing.    Respiratory:  Negative for shortness of breath.    Cardiovascular:  Negative for leg swelling.   Gastrointestinal:  Negative for nausea and vomiting.   Genitourinary:  Negative for difficulty urinating, dysuria, frequency and hematuria.   Musculoskeletal:  Negative for back pain.   Skin:  Negative for pallor.   Neurological:  Negative for dizziness, syncope, weakness and light-headedness.   Psychiatric/Behavioral:  Negative for sleep disturbance. The patient is not nervous/anxious.        OBJECTIVE     Current Weight: Weight - Scale: " 97.5 kg (215 lb)  Vitals:    12/17/24 1324   BP: 118/70   Pulse: 61   Resp: 16   Temp: (!) 97 °F (36.1 °C)   SpO2: 98%     Body mass index is 33.67 kg/m².    CURRENT MEDICATIONS       Current Outpatient Medications:     albuterol (Ventolin HFA) 90 mcg/act inhaler, Inhale 2 puffs every 6 (six) hours as needed for wheezing, Disp: 18 g, Rfl: 5    amantadine (SYMMETREL) 100 mg capsule, Take 100 mg by mouth 3 (three) times a day, Disp: , Rfl:     BD Pen Needle Alison 2nd Gen 32G X 4 MM MISC, , Disp: , Rfl:     clonazePAM (KlonoPIN) 1 mg tablet, Take 1 mg by mouth 2 (two) times a day, Disp: , Rfl:     ELDERBERRY PO, Take by mouth, Disp: , Rfl:     ergocalciferol (VITAMIN D2) 50,000 units, , Disp: , Rfl:     gabapentin (NEURONTIN) 300 mg capsule, Take 300 mg by mouth 2 (two) times a day, Disp: , Rfl:     Ingrezza 40 MG CAPS, , Disp: , Rfl:     levothyroxine 100 mcg tablet, Take 1 tablet (100 mcg total) by mouth daily, Disp: 90 tablet, Rfl: 1    liothyronine (CYTOMEL) 25 mcg tablet, Take 25 mcg by mouth every other day, Disp: , Rfl:     lisinopril (ZESTRIL) 2.5 mg tablet, Take 1 tablet (2.5 mg total) by mouth daily, Disp: 30 tablet, Rfl: 2    lithium carbonate (LITHOBID) 300 mg CR tablet, 2 tabs daily, Disp: , Rfl:     Ozempic, 1 MG/DOSE, 4 MG/3ML injection pen, , Disp: , Rfl:     senna (SENOKOT) 8.6 mg, Take 2 tablets (17.2 mg total) by mouth daily at bedtime, Disp: 60 tablet, Rfl: 11    SUMAtriptan (IMITREX) 50 mg tablet, Take 50 mg by mouth daily as needed, Disp: , Rfl:     traZODone (DESYREL) 50 mg tablet, Take 50 mg by mouth daily at bedtime, Disp: , Rfl:     Vraylar 1.5 MG capsule, Take 1.5 mg by mouth daily, Disp: , Rfl:       PAST MEDICAL HISTORY     Past Medical History:   Diagnosis Date    Arthritis     Bilateral knee    Asthma All my life; pretty mild these days    Bipolar 2 disorder (HCC) 1990    Chronic bronchitis (HCC)     Chronic kidney disease     Depression     Diabetes mellitus (HCC)     Disease of thyroid  gland 2009    GERD (gastroesophageal reflux disease)     Headache(784.0) 1995    Pneumonia 2020    Shingles 2009    Sleep apnea, obstructive     Vertigo     05//2024       PAST SURGICAL HISTORY     Past Surgical History:   Procedure Laterality Date    ABDOMINAL SURGERY  Oct 2007    COLONOSCOPY      DILATION AND CURETTAGE OF UTERUS      ELECTROCONVULSIVE THERAPY      HYSTERECTOMY  2007    KIDNEY SURGERY Right 2009    KNEE SURGERY  Right meniscus repair    NEPHRECTOMY  8/2/2009    SINUS SURGERY  1987       ALLERGIES     Allergies   Allergen Reactions    Iv Contrast [Iodinated Contrast Media] Vomiting     Vomiting blood    Lamictal [Lamotrigine] Hives     rash    Latuda [Lurasidone] Hives     rash    Levaquin [Levofloxacin] Hives     rash    Dulaglutide Diarrhea    Benztropine Diarrhea, Other (See Comments) and Headache     Insomnia, muscle weakness acathisia worsening    Brexpiprazole Other (See Comments)     Feels like something is crawling under her skin when she sits she starts uncontrollable movement to rock back and off     Escitalopram Other (See Comments)    Food Rash     Eggplant    Lumateperone Anxiety, Irritability and Other (See Comments)       SOCIAL HISTORY     Social History     Substance and Sexual Activity   Alcohol Use Not Currently    Alcohol/week: 1.0 standard drink of alcohol    Types: 1 Glasses of wine per week    Comment: Occasionally i have a drink     Social History     Substance and Sexual Activity   Drug Use Never     Social History     Tobacco Use   Smoking Status Never    Passive exposure: Never   Smokeless Tobacco Never       FAMILY HISTORY     Family History   Problem Relation Age of Onset    Heart disease Mother     Arthritis Mother     COPD Mother     Stroke Mother     Thyroid disease Mother     Glaucoma Mother     Colon cancer Father     Neuropathy Father     Cancer Father         Colon    Colon cancer Maternal Grandmother     Cancer Maternal Grandmother         Colon    Diabetes  Maternal Grandfather     Mental illness Paternal Grandfather     Heart attack Brother     Breast cancer Cousin     Asthma Sister     Depression Sister     Mental illness Sister     Anxiety disorder Sister     Heart disease Brother          of sudden heart attack    Diabetes Maternal Aunt        PHYSICAL EXAMINATION     Physical Exam  Vitals reviewed.   Constitutional:       General: She is not in acute distress.  HENT:      Head: Normocephalic.      Mouth/Throat:      Lips: Pink.      Mouth: Mucous membranes are moist.   Eyes:      General: Lids are normal. No scleral icterus.  Cardiovascular:      Rate and Rhythm: Normal rate and regular rhythm.      Heart sounds: S1 normal and S2 normal. No murmur heard.  Pulmonary:      Effort: Pulmonary effort is normal. No accessory muscle usage or respiratory distress.      Breath sounds: Normal breath sounds.   Abdominal:      General: There is no distension.      Tenderness: There is no abdominal tenderness.   Musculoskeletal:      Cervical back: Normal range of motion and neck supple. No tenderness.      Right lower leg: No edema.      Left lower leg: No edema.   Skin:     General: Skin is warm.      Coloration: Skin is not cyanotic or jaundiced.   Neurological:      General: No focal deficit present.      Mental Status: She is alert and oriented to person, place, and time.   Psychiatric:         Attention and Perception: Attention normal.         Speech: Speech normal.         Behavior: Behavior is cooperative.           LAB RESULTS     Results from last 7 days   Lab Units 24  1121   SODIUM mmol/L 140   POTASSIUM mmol/L 4.2   CHLORIDE mmol/L 106   CO2 mmol/L 28   BUN mg/dL 10   CREATININE mg/dL 0.85   EGFR ml/min/1.73sq m 72   CALCIUM mg/dL 9.6         RADIOLOGY RESULTS      CT abdomen and pelvis without contrast 3/26/2024:  FINDINGS:     ABDOMEN     LOWER CHEST: No clinically significant abnormality in the visualized lower chest.     LIVER/BILIARY TREE: The  "liver is enlarged measuring up to 18 cm in craniocaudal dimension and demonstrates diffuse fatty infiltration. Subcentimeter hypoattenuating lesion(s), too small to characterize but statistically likely benign, which do not   require follow-up (ACR White Paper 2017). No suspicious mass. Normal hepatic contours. No biliary dilation.     GALLBLADDER: Cholelithiasis without findings of acute cholecystitis.     SPLEEN: Unremarkable.     PANCREAS: Unremarkable.     ADRENAL GLANDS: Unremarkable.     KIDNEYS/URETERS: Status post right nephrectomy. 3 mm nonobstructing left intrarenal calculus. No hydronephrosis.     STOMACH AND BOWEL: No evidence for bowel obstruction. Colonic diverticulosis without evidence for acute diverticulitis.     APPENDIX: No findings to suggest appendicitis.     ABDOMINOPELVIC CAVITY: No ascites. No pneumoperitoneum. No lymphadenopathy.     VESSELS: Unremarkable for patient's age.     PELVIS     REPRODUCTIVE ORGANS: Unremarkable for patient's age.     URINARY BLADDER: Unremarkable.     ABDOMINAL WALL/INGUINAL REGIONS: Unremarkable.     BONES: No acute fracture or suspicious osseous lesion. Degenerative disc disease at L5-S1.     IMPRESSION:     No acute intra-abdominal pathology within the limits of this unenhanced examination.     Colonic diverticulosis without evidence for acute diverticulitis.     3 mm nonobstructing left intrarenal calculus.     Hepatomegaly/hepatic steatosis.      Recommend Nephrology follow-up in 6 months.    SINA Guzmán  Nephrology  12/17/2024      Portions of the record may have been created with voice recognition software. Occasional wrong word or \"sound a like\" substitutions may have occurred due to the inherent limitations of voice recognition software. Read the chart carefully and recognize, using context, where substitutions have occurred.   "

## 2024-12-12 ENCOUNTER — OFFICE VISIT (OUTPATIENT)
Age: 64
End: 2024-12-12
Payer: MEDICARE

## 2024-12-12 ENCOUNTER — PREP FOR PROCEDURE (OUTPATIENT)
Age: 64
End: 2024-12-12

## 2024-12-12 VITALS
BODY MASS INDEX: 35.2 KG/M2 | HEIGHT: 66 IN | HEART RATE: 80 BPM | DIASTOLIC BLOOD PRESSURE: 80 MMHG | WEIGHT: 219 LBS | OXYGEN SATURATION: 96 % | SYSTOLIC BLOOD PRESSURE: 118 MMHG

## 2024-12-12 DIAGNOSIS — K22.70 BARRETT'S ESOPHAGUS WITHOUT DYSPLASIA: Primary | ICD-10-CM

## 2024-12-12 DIAGNOSIS — D58.2 ELEVATED HEMOGLOBIN (HCC): ICD-10-CM

## 2024-12-12 DIAGNOSIS — E66.01 MORBID OBESITY WITH BMI OF 40.0-44.9, ADULT (HCC): ICD-10-CM

## 2024-12-12 DIAGNOSIS — K21.9 GASTROESOPHAGEAL REFLUX DISEASE WITHOUT ESOPHAGITIS: ICD-10-CM

## 2024-12-12 DIAGNOSIS — R17 ELEVATED BILIRUBIN: ICD-10-CM

## 2024-12-12 DIAGNOSIS — K59.00 CONSTIPATION, UNSPECIFIED CONSTIPATION TYPE: ICD-10-CM

## 2024-12-12 PROCEDURE — 99214 OFFICE O/P EST MOD 30 MIN: CPT | Performed by: INTERNAL MEDICINE

## 2024-12-12 RX ORDER — SODIUM CHLORIDE, SODIUM LACTATE, POTASSIUM CHLORIDE, CALCIUM CHLORIDE 600; 310; 30; 20 MG/100ML; MG/100ML; MG/100ML; MG/100ML
125 INJECTION, SOLUTION INTRAVENOUS CONTINUOUS
OUTPATIENT
Start: 2024-12-12

## 2024-12-12 RX ORDER — PEN NEEDLE, DIABETIC 32GX 5/32"
NEEDLE, DISPOSABLE MISCELLANEOUS
COMMUNITY
Start: 2024-10-07

## 2024-12-12 RX ORDER — AMANTADINE HYDROCHLORIDE 100 MG/1
100 CAPSULE, GELATIN COATED ORAL 3 TIMES DAILY
COMMUNITY
Start: 2024-12-06

## 2024-12-12 RX ORDER — SENNOSIDES 8.6 MG
17.2 TABLET ORAL
Qty: 60 TABLET | Refills: 11 | Status: SHIPPED | OUTPATIENT
Start: 2024-12-12

## 2024-12-12 RX ORDER — VALBENAZINE 40 MG/1
CAPSULE ORAL
COMMUNITY
Start: 2024-11-18

## 2024-12-12 RX ORDER — SEMAGLUTIDE 1.34 MG/ML
INJECTION, SOLUTION SUBCUTANEOUS
COMMUNITY
Start: 2024-12-11

## 2024-12-12 NOTE — PROGRESS NOTES
[facility] - Outpatient Note  Katlyn Frye 64 y.o. female MRN: [id number]  Encounter: [id number]      ASSESSMENT AND PLAN:    Katlyn Frye is a 64 y.o. old pleasant female with PMH of diabetes on Ozempic, bipolar depression, hypothyroidism, fatty liver, obesity who presents for followup    Elevated bilirubin, right upper quadrant tenderness  History of cholelithiasis- fortunately her bilirubin has normalized.  It is unclear if her elevated bilirubin was indirect or direct.  Given she has no abdominal pain and her bilirubin has normalized would hold off on any further workup.  Her symptoms are not consistent with symptomatic cholelithiasis as she has no abdominal pain with eating or without touching her right upper quadrant.  I do not think she needs her gallbladder removed now.  Her tenderness is very superficial and may be musculoskeletal in nature.  Monitor clinically    History of Clay's esophagus-noted on EGD in 2019 with short segment Clay's.  Repeat recommended in 3 years.  Plan for repeat EGD for Clay's  She does not want to start PPI until repeat EGD  GERD lifestyle changes discussed, including avoidance of trigger foods (potential foods include coffee, caffeine, chocolate, mint, tomato-based products, spicy foods, fatty foods), avoid tight fitting clothing, elevated head of bed 30 degrees, avoid eating 2-3 hours prior to bedtime, weight loss, avoid alcohol, avoid tobacco use.    Fatty liver-no obvious signs or symptoms of cirrhosis  Check ultrasound elastography  Continue Ozempic  Counseled on 5 to 10% weight loss  NAFLD Fibrosis Score: 0.88 at 12/9/2024 11:52 AM  Calculated from:  SGOT/AST: 23 U/L at 12/9/2024 11:52 AM  SGPT/ALT: 20 U/L at 12/9/2024 11:52 AM  Serum Albumin: 4.2 g/dL at 12/9/2024 11:52 AM  Platelets: 219 Thousands/uL at 3/11/2024 10:15 AM  Age: 64 years  BMI: 37.1 at 10/2/2024  2:05 PM  Weight (recorded): 104.15 kg at 10/2/2024  2:05 PM  Height:  167.60 cm at 10/2/2024  2:05 PM  Has Diabetes: Yes          1. Clay's esophagus without dysplasia (Primary)    - EGD; Future    2. Gastroesophageal reflux disease without esophagitis    - EGD; Future    3. Elevated hemoglobin (HCC)      4. Morbid obesity with BMI of 40.0-44.9, adult ([facility])      5. Constipation, unspecified constipation type    - senna (SENOKOT) 8.6 mg; Take 2 tablets (17.2 mg total) by mouth daily at bedtime  Dispense: 60 tablet; Refill: 11    ______________________________________________________________________    SUBJECTIVE:      Patient here to discuss elevated bilirubin patient reports no abdominal pain.  Does have some tenderness with palpation in the right upper quadrant.  No nausea or vomiting.  Stools have improved from previously after stopping magnesium.  Otherwise feels well overall.      I reviewed prior external notes    I reviewed previous lab results and images      REVIEW OF SYSTEMS:     REVIEW OF ALL OTHER SYSTEMS IS OTHERWISE NEGATIVE.      Historical Information   Past Medical History:   Diagnosis Date    Arthritis     Bilateral knee    Asthma All my life; pretty mild these days    Bipolar 2 disorder (HCC) 1990    Chronic bronchitis (HCC)     Chronic kidney disease     Depression     Diabetes mellitus (HCC)     Disease of thyroid gland 2009    GERD (gastroesophageal reflux disease)     Headache(784.0) 1995    Pneumonia 2020    Shingles 2009    Sleep apnea, obstructive     Vertigo     05//2024     Past Surgical History:   Procedure Laterality Date    ABDOMINAL SURGERY  Oct 2007    COLONOSCOPY      DILATION AND CURETTAGE OF UTERUS      ELECTROCONVULSIVE THERAPY      HYSTERECTOMY  2007    KIDNEY SURGERY Right 2009    KNEE SURGERY  Right meniscus repair    NEPHRECTOMY  8/2/2009    SINUS SURGERY  1987     Social History   Social History     Substance and Sexual Activity   Alcohol Use Not Currently    Alcohol/week: 1.0 standard drink of alcohol    Types: 1 Glasses of wine per week     Comment: Occasionally i have a drink     Social History     Substance and Sexual Activity   Drug Use Never     Social History     Tobacco Use   Smoking Status Never    Passive exposure: Never   Smokeless Tobacco Never     Family History   Problem Relation Age of Onset    Heart disease Mother     Arthritis Mother     COPD Mother     Stroke Mother     Thyroid disease Mother     Glaucoma Mother     Colon cancer Father     Neuropathy Father     Cancer Father         Colon    Colon cancer Maternal Grandmother     Cancer Maternal Grandmother         Colon    Diabetes Maternal Grandfather     Mental illness Paternal Grandfather     Heart attack Brother     Breast cancer Cousin     Asthma Sister     Depression Sister     Mental illness Sister     Anxiety disorder Sister     Heart disease Brother          of sudden heart attack    Diabetes Maternal Aunt        Meds/Allergies       Current Outpatient Medications:     albuterol (Ventolin HFA) 90 mcg/act inhaler    amantadine (SYMMETREL) 100 mg capsule    BD Pen Needle Alison 2nd Gen 32G X 4 MM MISC    clonazePAM (KlonoPIN) 1 mg tablet    ELDERBERRY PO    ergocalciferol (VITAMIN D2) 50,000 units    gabapentin (NEURONTIN) 300 mg capsule    Ingrezza 40 MG CAPS    liothyronine (CYTOMEL) 25 mcg tablet    lithium carbonate (LITHOBID) 300 mg CR tablet    Ozempic, 1 MG/DOSE, 4 MG/3ML injection pen    senna (SENOKOT) 8.6 mg    SUMAtriptan (IMITREX) 50 mg tablet    traZODone (DESYREL) 50 mg tablet    Vraylar 1.5 MG capsule    levothyroxine 100 mcg tablet    Allergies   Allergen Reactions    Iv Contrast [Iodinated Contrast Media] Vomiting     Vomiting blood    Lamictal [Lamotrigine] Hives     rash    Latuda [Lurasidone] Hives     rash    Levaquin [Levofloxacin] Hives     rash    Dulaglutide Diarrhea    Benztropine Diarrhea, Other (See Comments) and Headache     Insomnia, muscle weakness acathisia worsening    Brexpiprazole Other (See Comments)     Feels like something is crawling under  "her skin when she sits she starts uncontrollable movement to rock back and off     Escitalopram Other (See Comments)    Food Rash     Eggplant    Lumateperone Anxiety, Irritability and Other (See Comments)           Objective     Blood pressure 118/80, pulse 80, height 5' 6\" (1.676 m), weight 99.3 kg (219 lb), SpO2 96%. Body mass index is 35.35 kg/m².      PHYSICAL EXAM:      General Appearance:   Alert, cooperative, no distress   HEENT:   Normocephalic, atraumatic, anicteric.     Neck:  Supple, symmetrical, trachea midline   Lungs:   Clear to auscultation bilaterally; no rales, rhonchi or wheezing; respirations unlabored    Heart::   Regular rate and rhythm; no murmur, rub, or gallop.   Abdomen:   Soft, non-tender, non-distended; normal bowel sounds; no masses, no organomegaly    Genitalia:   Deferred    Rectal:   Deferred    Extremities:  No cyanosis, clubbing or edema    Pulses:  2+ and symmetric    Skin:  No jaundice, rashes, or lesions    Lymph nodes:  No palpable cervical lymphadenopathy        Lab Results:   No visits with results within 1 Day(s) from this visit.   Latest known visit with results is:   Telephone on 09/10/2024   Component Date Value    Right Eye Diabetic Retin* 09/06/2024 None     Left Eye Diabetic Retino* 09/06/2024 None          Radiology Results:   US ABDOMEN LIMITED (RUQ)  Result Date: 12/6/2024  Narrative: History: Right upper quadrant pain Technique: Ultrasound examination of right upper quadrant Comparison: None Findings: Liver is of overall increased in parenchymal echogenicity consistent with fatty change with no focal lesions. No intra or extrahepatic biliary ductal dilatation with the CBD measuring 0.6 cm. Gallbladder contains small stones or perhaps sludge balls with no wall thickening or pericholecystic fluid. No focal pancreatic abnormality in the visualized pancreas. The right kidneys is normal in size and parenchymal thickness without hydronephrosis. The proximal aorta and IVC " are within normal limits.    Impression: Impression: 1.  Diffuse fatty change of liver. 2.  Cholelithiasis or perhaps sludge balls without acute cholecystitis. Significant Findings: [place] Act 112. Workstation:[id number]

## 2024-12-12 NOTE — PATIENT INSTRUCTIONS
Scheduled date of EGD(as of today): 3/4/25  Physician performing EGD: Tonio  Location of EGD: [place]  Instructions reviewed with patient by: Shannon LY  Clearances:

## 2024-12-16 ENCOUNTER — TELEPHONE (OUTPATIENT)
Dept: NEPHROLOGY | Facility: CLINIC | Age: 64
End: 2024-12-16

## 2024-12-16 ENCOUNTER — APPOINTMENT (OUTPATIENT)
Age: 64
End: 2024-12-16
Payer: MEDICARE

## 2024-12-16 DIAGNOSIS — E11.29 TYPE 2 DIABETES MELLITUS WITH MICROALBUMINURIA, WITHOUT LONG-TERM CURRENT USE OF INSULIN (HCC): ICD-10-CM

## 2024-12-16 DIAGNOSIS — N18.2 STAGE 2 CHRONIC KIDNEY DISEASE: ICD-10-CM

## 2024-12-16 DIAGNOSIS — R80.9 TYPE 2 DIABETES MELLITUS WITH MICROALBUMINURIA, WITHOUT LONG-TERM CURRENT USE OF INSULIN (HCC): ICD-10-CM

## 2024-12-16 LAB
ANION GAP SERPL CALCULATED.3IONS-SCNC: 6 MMOL/L (ref 4–13)
BACTERIA UR QL AUTO: ABNORMAL /HPF
BILIRUB UR QL STRIP: NEGATIVE
BUN SERPL-MCNC: 10 MG/DL (ref 5–25)
CALCIUM SERPL-MCNC: 9.6 MG/DL (ref 8.4–10.2)
CHLORIDE SERPL-SCNC: 106 MMOL/L (ref 96–108)
CLARITY UR: CLEAR
CO2 SERPL-SCNC: 28 MMOL/L (ref 21–32)
COLOR UR: YELLOW
CREAT SERPL-MCNC: 0.85 MG/DL (ref 0.6–1.3)
CREAT UR-MCNC: 113 MG/DL
GFR SERPL CREATININE-BSD FRML MDRD: 72 ML/MIN/1.73SQ M
GLUCOSE P FAST SERPL-MCNC: 81 MG/DL (ref 65–99)
GLUCOSE UR STRIP-MCNC: NEGATIVE MG/DL
HGB UR QL STRIP.AUTO: NEGATIVE
KETONES UR STRIP-MCNC: NEGATIVE MG/DL
LEUKOCYTE ESTERASE UR QL STRIP: ABNORMAL
MICROALBUMIN UR-MCNC: 184.9 MG/L
MICROALBUMIN/CREAT 24H UR: 164 MG/G CREATININE (ref 0–30)
MUCOUS THREADS UR QL AUTO: ABNORMAL
NITRITE UR QL STRIP: NEGATIVE
NON-SQ EPI CELLS URNS QL MICRO: ABNORMAL /HPF
PH UR STRIP.AUTO: 6.5 [PH]
POTASSIUM SERPL-SCNC: 4.2 MMOL/L (ref 3.5–5.3)
PROT UR STRIP-MCNC: ABNORMAL MG/DL
RBC #/AREA URNS AUTO: ABNORMAL /HPF
SODIUM SERPL-SCNC: 140 MMOL/L (ref 135–147)
SP GR UR STRIP.AUTO: 1.01 (ref 1–1.03)
UROBILINOGEN UR STRIP-ACNC: <2 MG/DL
WBC #/AREA URNS AUTO: ABNORMAL /HPF

## 2024-12-16 PROCEDURE — 36415 COLL VENOUS BLD VENIPUNCTURE: CPT

## 2024-12-16 PROCEDURE — 81001 URINALYSIS AUTO W/SCOPE: CPT

## 2024-12-16 PROCEDURE — 82043 UR ALBUMIN QUANTITATIVE: CPT

## 2024-12-16 PROCEDURE — 80048 BASIC METABOLIC PNL TOTAL CA: CPT

## 2024-12-16 PROCEDURE — 82570 ASSAY OF URINE CREATININE: CPT

## 2024-12-16 NOTE — TELEPHONE ENCOUNTER
I called patient to confirm appointment for tomorrow Tuesday 12/17/2024 with Dr. Rader reminded patient to have non fasting labs done prior to appointment tomorrow per patient she will be going to have labs done today 12/16/2024

## 2024-12-17 ENCOUNTER — OFFICE VISIT (OUTPATIENT)
Dept: NEPHROLOGY | Facility: CLINIC | Age: 64
End: 2024-12-17
Payer: MEDICARE

## 2024-12-17 VITALS
RESPIRATION RATE: 16 BRPM | WEIGHT: 215 LBS | HEART RATE: 61 BPM | HEIGHT: 67 IN | DIASTOLIC BLOOD PRESSURE: 70 MMHG | TEMPERATURE: 97 F | OXYGEN SATURATION: 98 % | SYSTOLIC BLOOD PRESSURE: 118 MMHG | BODY MASS INDEX: 33.74 KG/M2

## 2024-12-17 DIAGNOSIS — E11.29 TYPE 2 DIABETES MELLITUS WITH MICROALBUMINURIA, WITHOUT LONG-TERM CURRENT USE OF INSULIN (HCC): ICD-10-CM

## 2024-12-17 DIAGNOSIS — R80.9 TYPE 2 DIABETES MELLITUS WITH MICROALBUMINURIA, WITHOUT LONG-TERM CURRENT USE OF INSULIN (HCC): ICD-10-CM

## 2024-12-17 DIAGNOSIS — R80.9 MICROALBUMINURIA: ICD-10-CM

## 2024-12-17 DIAGNOSIS — N18.2 STAGE 2 CHRONIC KIDNEY DISEASE: Primary | ICD-10-CM

## 2024-12-17 PROCEDURE — 99214 OFFICE O/P EST MOD 30 MIN: CPT

## 2024-12-17 RX ORDER — LISINOPRIL 2.5 MG/1
2.5 TABLET ORAL DAILY
Qty: 30 TABLET | Refills: 2 | Status: SHIPPED | OUTPATIENT
Start: 2024-12-17

## 2024-12-17 NOTE — PATIENT INSTRUCTIONS
Drink plenty of water, up to 64 ounces daily.  Add lemon or lime juice to your water, up to 4 ounces daily to help protect against kidney stones. You do have a small stone in your left kidney. Since you have a solitary kidney, if you feel any left lower back pain radiating to your groin please report to the emergency department.   We will start low dose lisinopril 2.5 mg daily for management of the protein in your urine. Let your psychiatrist who is managing your lithium levels know that we are starting low dose of the medication.   Avoid NSAIDs (e.g., Ibuprofen, Motrin, Aleve, Naproxen, Advil)  We will check a BMP by the end of the month to ensure kidney function stable and you are feeling well with the medication.     Patient Education     Lisinopril (lyse IN oh pril)   Brand Names: US Prinivil [DSC]; Qbrelis; Zestril   Brand Names: [place]ure[place] APO-Lisinopril; Auro-Lisinopril; Prinivil [DSC]; PRO-Lisinopril-10; PRO-Lisinopril-20; PRO-Lisinopril-5; RAN-Lisinopril; SANDOZ Lisinopril [DSC]; TEVA-Lisinopril (Type P) [DSC]; TEVA-Lisinopril (Type Z); Zestril   Warning   Do not take if you are pregnant. Use during pregnancy may cause birth defects or loss of the unborn baby. If you get pregnant or plan on getting pregnant while taking this drug, call your doctor right away.  What is this drug used for?   It is used to treat high blood pressure.  It is used to treat heart failure (weak heart).  It is used to help heart function after a heart attack.  It may be given to you for other reasons such as for management of proteinuria in chronic kidney disease. Talk with the doctor.  What do I need to tell my doctor BEFORE I take this drug?   For all patients taking this drug:   If you are allergic to this drug; any part of this drug; or any other drugs, foods, or substances. Tell your doctor about the allergy and what signs you had.  If you have ever had a very bad or life-threatening reaction called angioedema. Signs may be  swelling of the hands, face, lips, eyes, tongue, or throat; trouble breathing; trouble swallowing; unusual hoarseness.  If you have kidney problems.  If you are taking a drug that has aliskiren in it and you also have diabetes or kidney problems.  If you have taken a drug that has sacubitril in it in the last 36 hours.  If you are breast-feeding. Do not breast-feed while you take this drug.  Children:   If your child is younger than 6 years of age. Do not give this drug to a child younger than 6 years of age.  This is not a list of all drugs or health problems that interact with this drug.  Tell your doctor and pharmacist about all of your drugs (prescription or OTC, natural products, vitamins) and health problems. You must check to make sure that it is safe for you to take this drug with all of your drugs and health problems. Do not start, stop, or change the dose of any drug without checking with your doctor.  What are some things I need to know or do while I take this drug?   Tell all of your health care providers that you take this drug. This includes your doctors, nurses, pharmacists, and dentists.  Avoid driving and doing other tasks or actions that call for you to be alert until you see how this drug affects you.  To lower the chance of feeling dizzy or passing out, rise slowly if you have been sitting or lying down. Be careful going up and down stairs.  If you have high blood sugar (diabetes), you will need to watch your blood sugar closely.  Check your blood pressure as you have been told.  Have your blood work and other lab tests checked as you have been told by your doctor.  If you are taking a salt substitute that has potassium in it, a potassium-sparing diuretic, or a potassium product, talk with your doctor.  If you are on a low-salt or salt-free diet, talk with your doctor.  If you are taking this drug and have high blood pressure, talk with your doctor before using OTC products that may raise blood  pressure. These include cough or cold drugs, diet pills, stimulants, non-steroidal anti-inflammatory drugs (NSAIDs) like ibuprofen or naproxen, and some natural products or aids.  Talk with your doctor before you drink alcohol.  Be careful in hot weather or while being active. Drink lots of fluids to stop fluid loss.  Tell your doctor if you have too much sweat, fluid loss, throwing up, or loose stools. This may lead to low blood pressure.  This drug may not work as well to lower blood pressure in Black patients. Sometimes another drug may need to be given with this drug. If you have any questions, talk with the doctor.  A severe and sometimes deadly reaction called angioedema has happened. The chance of angioedema may be higher in Black patients.  Low white blood cell counts have happened with captopril, a drug like this one. This may lead to more chance of getting an infection. Most of the time, this has happened in people with kidney problems, mainly if they have certain other health problems. Call your doctor right away if you have signs of infection like fever, chills, or sore throat.  If you are 65 or older, use this drug with care. You could have more side effects.  What are some side effects that I need to call my doctor about right away?   WARNING/CAUTION: Even though it may be rare, some people may have very bad and sometimes deadly side effects when taking a drug. Tell your doctor or get medical help right away if you have any of the following signs or symptoms that may be related to a very bad side effect:  Signs of an allergic reaction, like rash; hives; itching; red, swollen, blistered, or peeling skin with or without fever; wheezing; tightness in the chest or throat; trouble breathing, swallowing, or talking; unusual hoarseness; or swelling of the mouth, face, lips, tongue, or throat.  Signs of kidney problems like unable to pass urine, change in how much urine is passed, blood in the urine, or a big  weight gain.  Signs of high potassium levels like a heartbeat that does not feel normal; feeling confused; feeling weak, lightheaded, or dizzy; feeling like passing out; numbness or tingling; or shortness of breath.  Severe dizziness or passing out.  Cough that does not go away.  Severe stomach pain.  Severe upset stomach or throwing up.  Chest pain or pressure.  Liver problems have happened with drugs like this one. Sometimes, this has been deadly. Call your doctor right away if you have signs of liver problems like dark urine, tiredness, decreased appetite, upset stomach or stomach pain, light-colored stools, throwing up, or yellow skin or eyes.  What are some other side effects of this drug?   All drugs may cause side effects. However, many people have no side effects or only have minor side effects. Call your doctor or get medical help if any of these side effects or any other side effects bother you or do not go away:  Dizziness or headache.  Cough.  These are not all of the side effects that may occur. If you have questions about side effects, call your doctor. Call your doctor for medical advice about side effects.  You may report side effects to your national health agency.  You may report side effects to the FDA at 1-969.245.9551. You may also report side effects at https://www.fda.gov/medwatch.  How is this drug best taken?   Use this drug as ordered by your doctor. Read all information given to you. Follow all instructions closely.  All products:   Take with or without food.  Take this drug at the same time of day.  Keep taking this drug as you have been told by your doctor or other health care provider, even if you feel well.  Drink lots of noncaffeine liquids unless told to drink less liquid by your doctor.  Tablets:   A liquid (suspension) can be made if you cannot swallow pills. Talk with your doctor or pharmacist.  If a liquid (suspension) is made, shake well before use.  All liquid products:    Measure liquid doses carefully. Use the measuring device that comes with this drug. If there is none, ask the pharmacist for a device to measure this drug.  What do I do if I miss a dose?   Take a missed dose as soon as you think about it.  If it is close to the time for your next dose, skip the missed dose and go back to your normal time.  Do not take 2 doses at the same time or extra doses.  How do I store and/or throw out this drug?   All products:   Store at room temperature in a dry place. Do not store in a bathroom.  Keep lid tightly closed.  Keep all drugs in a safe place. Keep all drugs out of the reach of children and pets.  Throw away unused or  drugs. Do not flush down a toilet or pour down a drain unless you are told to do so. Check with your pharmacist if you have questions about the best way to throw out drugs. There may be drug take-back programs in your area.  Tablets:   If a liquid (suspension) is made from the tablets, throw away any part not used after 28 days.  Liquid (solution):   Do not freeze.  Protect from heat.  General drug facts   If your symptoms or health problems do not get better or if they become worse, call your doctor.  Do not share your drugs with others and do not take anyone else's drugs.  Some drugs may have another patient information leaflet. If you have any questions about this drug, please talk with your doctor, nurse, pharmacist, or other health care provider.  Some drugs may have another patient information leaflet. Check with your pharmacist. If you have any questions about this drug, please talk with your doctor, nurse, pharmacist, or other health care provider.  If you think there has been an overdose, call your poison control center or get medical care right away. Be ready to tell or show what was taken, how much, and when it happened.  Consumer Information Use and Disclaimer   This generalized information is a limited summary of diagnosis, treatment, and/or  "medication information. It is not meant to be comprehensive and should be used as a tool to help the user understand and/or assess potential diagnostic and treatment options. It does NOT include all information about conditions, treatments, medications, side effects, or risks that may apply to a specific patient. It is not intended to be medical advice or a substitute for the medical advice, diagnosis, or treatment of a health care provider based on the health care provider's examination and assessment of a patient's specific and unique circumstances. Patients must speak with a health care provider for complete information about their health, medical questions, and treatment options, including any risks or benefits regarding use of medications. This information does not endorse any treatments or medications as safe, effective, or approved for treating a specific patient. UpToDate, Inc. and its affiliates disclaim any warranty or liability relating to this information or the use thereof. The use of this information is governed by the Terms of Use, available at https://www.[employer]/en/know/clinical-effectiveness-terms.  Last Reviewed Date   2023-09-08  Copyright   © 2024 UpToDate, Inc. and its affiliates and/or licensors. All rights reserved.  Patient Education     Chronic kidney disease   The Basics   Written by the doctors and editors at [employer]   What is chronic kidney disease? -- Chronic kidney disease, or \"CKD,\" is when the kidneys stop working as well as they should. When they are working normally, the kidneys filter blood and remove waste and excess salt and water (figure 1).  In people with CKD, the kidneys slowly lose the ability to filter blood. In time, the kidneys can stop working completely. That is why it is so important to keep CKD from getting worse.  What are the symptoms of CKD? -- At first, CKD causes no symptoms. As the disease gets worse, it can:   Make your feet, ankles, or legs swell " "(called \"edema\")   Give you high blood pressure   Make you very tired   Damage your bones  Will I need tests? -- Yes. Your doctor will want to see you regularly. You will probably have appointments at least once a year, and you will get regular tests to check your kidneys. These include blood and urine tests.  If your CKD gets worse over time, you will probably need to see a \"nephrologist.\" This is a doctor who takes care of people with kidney disease.  Is there anything I can do to keep my kidneys from getting worse? -- Yes. If you have CKD, you can protect your kidneys if you:   Take all of your prescribed medicines every day, and follow all of your doctor's instructions for how to take them.   Keep your blood sugar in a healthy range, if you have diabetes.   Change your diet, if your doctor or nurse recommends to. They might suggest working with a dietitian (nutrition expert).   Quit smoking, if you smoke.   Lose weight, if you have excess body weight.   Avoid medicines that can harm the kidneys - One example is nonsteroidal antiinflammatory drugs (\"NSAIDs\"). These medicines include ibuprofen (sample brand names: Advil, Motrin) and naproxen (sample brand name: Aleve). There are other medicines that people with CKD need to avoid, too. Check with your doctor, nurse, or kidney specialist before starting any new medicines or supplements, even those you can buy without a prescription.  How is CKD treated? -- People in the early stages of CKD can take medicines to keep the disease from getting worse. For example, many people with CKD should take medicines known as \"ACE inhibitors\" or \"angiotensin receptor blockers.\" If your doctor or nurse prescribes these medicines, it is very important that you take them every day as directed. If they cause side effects or cost too much, tell your doctor or nurse. They might have solutions to offer.  What happens if my kidneys stop working completely? -- If your kidneys can no longer " filter blood properly, you can choose between 3 different treatments to take over their job. Your choices are:   Kidney transplant - After transplant surgery, the new kidney can do the job of your own kidneys. If you have a kidney transplant, you will need to take medicines for the rest of your life to keep your body from reacting badly to the new kidney. (You only need 1 kidney to live.)   Hemodialysis - This is a procedure in which a dialysis machine takes over the job of the kidneys. The machine pumps blood out of the body, filters it, and returns it to the body. If you choose hemodialysis, you will need to be hooked up to the machine at least 3 times a week for several hours for the rest of your life. Before you start, you will also need to have surgery to prepare a blood vessel for attachment to the machine.   Peritoneal dialysis - This involves piping a special fluid into the belly every day. If you choose peritoneal dialysis, you will need surgery to have a tube implanted in your belly. Then, you will have to learn how to pipe the fluid in and out through that tube.  How do I choose between the different treatment options? -- You and your doctor will need to work together to find a treatment that's right for you. Kidney transplant surgery is usually the best option for most people. But often, there are no kidneys available for transplant.  Ask your doctor to explain all of your options and how they might work for you. Then, talk openly with them about how you feel about all of the options. You might even decide that you do not want any treatment. That is your choice.  All topics are updated as new evidence becomes available and our peer review process is complete.  This topic retrieved from [employer] on: May 18, 2024.  Topic 97105 Version 34.0  Release: 32.4.3 - C32.137  © 2024 UpToDate, Inc. and/or its affiliates. All rights reserved.  figure 1: Anatomy of the urinary tract     Urine is made by the kidneys.  It passes from the kidneys into the bladder through 2 tubes called the ureters. Then, it leaves the bladder through another tube called the urethra.  Graphic 70065 Version 8.0  Consumer Information Use and Disclaimer   Disclaimer: This generalized information is a limited summary of diagnosis, treatment, and/or medication information. It is not meant to be comprehensive and should be used as a tool to help the user understand and/or assess potential diagnostic and treatment options. It does NOT include all information about conditions, treatments, medications, side effects, or risks that may apply to a specific patient. It is not intended to be medical advice or a substitute for the medical advice, diagnosis, or treatment of a health care provider based on the health care provider's examination and assessment of a patient's specific and unique circumstances. Patients must speak with a health care provider for complete information about their health, medical questions, and treatment options, including any risks or benefits regarding use of medications. This information does not endorse any treatments or medications as safe, effective, or approved for treating a specific patient. UpToDate, Inc. and its affiliates disclaim any warranty or liability relating to this information or the use thereof.The use of this information is governed by the Terms of Use, available at https://www.[employer].com/en/know/clinical-effectiveness-terms. 2024© UpToDate, Inc. and its affiliates and/or licensors. All rights reserved.  Copyright   © 2024 UpToDate, Inc. and/or its affiliates. All rights reserved.

## 2024-12-17 NOTE — ASSESSMENT & PLAN NOTE
Orders:    lisinopril (ZESTRIL) 2.5 mg tablet; Take 1 tablet (2.5 mg total) by mouth daily    Urinalysis with microscopic; Future    Albumin / creatinine urine ratio; Future    Suspect etiology may be secondary to diabetic nephropathy. Most recent urinalysis showed 1+ protein with no significant hematuria. Urine microalbumin to creatinine ratio elevated at 164.     Patient has a history of orthostatic hypotension but in the interim blood pressure readings have been stable.  In the setting of worsening microalbuminuria would recommend initiation of lisinopril 2.5 mg once daily for further management.    As lisinopril can impact lithium levels in the blood I advised her to inform her prescribing physician to inform them of this medication change that lithium levels can be closely monitored.  She reports that her last reading was within normal range.    Will check BMP within 2 weeks to monitor for renal function stability.

## 2024-12-17 NOTE — ASSESSMENT & PLAN NOTE
Lab Results   Component Value Date    EGFR 72 12/16/2024    EGFR 75 12/09/2024    EGFR 66 11/07/2024    CREATININE 0.85 12/16/2024    CREATININE 0.86 12/09/2024    CREATININE 0.96 11/07/2024       Orders:    lisinopril (ZESTRIL) 2.5 mg tablet; Take 1 tablet (2.5 mg total) by mouth daily    Basic metabolic panel; Future    Basic metabolic panel; Future    Urinalysis with microscopic; Future    Albumin / creatinine urine ratio; Future    Patient underwent right total nephrectomy 2009, most recent CT imaging on 3/26/224 showed no structural abnormality of the left kidney apart from a 3 mm nonobstructing intrarenal calculus. After review of the medical record, it appears baseline creatinine levels fluctuate around 0.8.      Etiology of underlying CKD multifactorial and suspected back in [place] to reduced nephron mass following total nephrectomy and diabetic nephropathy with documented microalbuminuria that has been progressively worsening.      Patient is taking lithium for management of bipolar disorder and depression, her psychiatrist is closely monitoring medications and no evidence of change in renal function or hyponatremia on most recent labs.    Current creatinine level 0.85 with an eGFR of 72.  Advised the importance of staying hydrated with up to 64 ounces of water daily and avoidance of nephrotoxic agents such as NSAIDs.  Will repeat BMP and urine studies prior to follow-up.

## 2024-12-17 NOTE — ASSESSMENT & PLAN NOTE
Lab Results   Component Value Date    HGBA1C 6.1 02/08/2024       Orders:    Basic metabolic panel; Future    Urinalysis with microscopic; Future    Albumin / creatinine urine ratio; Future    Currently maintained on Ozempic. Most recent hemoglobin A1C was 6.1.  Discussed importance of a glycemic control in the setting of underlying CKD and recommend goal hemoglobin maintained below 7.0.  Will initiate lisinopril as above for management of microalbuminuria.

## 2024-12-27 ENCOUNTER — APPOINTMENT (OUTPATIENT)
Age: 64
End: 2024-12-27
Payer: MEDICARE

## 2024-12-27 DIAGNOSIS — R80.9 MICROALBUMINURIA: ICD-10-CM

## 2024-12-27 DIAGNOSIS — R80.9 TYPE 2 DIABETES MELLITUS WITH MICROALBUMINURIA, WITHOUT LONG-TERM CURRENT USE OF INSULIN (HCC): ICD-10-CM

## 2024-12-27 DIAGNOSIS — E11.29 TYPE 2 DIABETES MELLITUS WITH MICROALBUMINURIA, WITHOUT LONG-TERM CURRENT USE OF INSULIN (HCC): ICD-10-CM

## 2024-12-27 DIAGNOSIS — N18.2 STAGE 2 CHRONIC KIDNEY DISEASE: ICD-10-CM

## 2024-12-27 LAB
ANION GAP SERPL CALCULATED.3IONS-SCNC: 7 MMOL/L (ref 4–13)
BACTERIA UR QL AUTO: NORMAL /HPF
BILIRUB UR QL STRIP: NEGATIVE
BUN SERPL-MCNC: 14 MG/DL (ref 5–25)
CALCIUM SERPL-MCNC: 9.6 MG/DL (ref 8.4–10.2)
CHLORIDE SERPL-SCNC: 103 MMOL/L (ref 96–108)
CLARITY UR: CLEAR
CO2 SERPL-SCNC: 29 MMOL/L (ref 21–32)
COLOR UR: NORMAL
CREAT SERPL-MCNC: 0.97 MG/DL (ref 0.6–1.3)
CREAT UR-MCNC: 73.5 MG/DL
EST. AVERAGE GLUCOSE BLD GHB EST-MCNC: 120 MG/DL
GFR SERPL CREATININE-BSD FRML MDRD: 61 ML/MIN/1.73SQ M
GLUCOSE P FAST SERPL-MCNC: 112 MG/DL (ref 65–99)
GLUCOSE UR STRIP-MCNC: NEGATIVE MG/DL
HBA1C MFR BLD: 5.8 %
HGB UR QL STRIP.AUTO: NEGATIVE
KETONES UR STRIP-MCNC: NEGATIVE MG/DL
LEUKOCYTE ESTERASE UR QL STRIP: NEGATIVE
MICROALBUMIN UR-MCNC: 13.1 MG/L
MICROALBUMIN/CREAT 24H UR: 18 MG/G CREATININE (ref 0–30)
NITRITE UR QL STRIP: NEGATIVE
NON-SQ EPI CELLS URNS QL MICRO: NORMAL /HPF
PH UR STRIP.AUTO: 6 [PH]
POTASSIUM SERPL-SCNC: 4.2 MMOL/L (ref 3.5–5.3)
PROT UR STRIP-MCNC: NEGATIVE MG/DL
RBC #/AREA URNS AUTO: NORMAL /HPF
SODIUM SERPL-SCNC: 139 MMOL/L (ref 135–147)
SP GR UR STRIP.AUTO: 1.01 (ref 1–1.03)
T4 FREE SERPL-MCNC: 0.84 NG/DL (ref 0.61–1.12)
TSH SERPL DL<=0.05 MIU/L-ACNC: 0.3 UIU/ML (ref 0.45–4.5)
UROBILINOGEN UR STRIP-ACNC: <2 MG/DL
WBC #/AREA URNS AUTO: NORMAL /HPF

## 2024-12-27 PROCEDURE — 82570 ASSAY OF URINE CREATININE: CPT

## 2024-12-27 PROCEDURE — 81001 URINALYSIS AUTO W/SCOPE: CPT

## 2024-12-27 PROCEDURE — 80048 BASIC METABOLIC PNL TOTAL CA: CPT

## 2024-12-27 PROCEDURE — 83036 HEMOGLOBIN GLYCOSYLATED A1C: CPT

## 2024-12-27 PROCEDURE — 82043 UR ALBUMIN QUANTITATIVE: CPT

## 2024-12-29 ENCOUNTER — RESULTS FOLLOW-UP (OUTPATIENT)
Age: 64
End: 2024-12-29

## 2025-01-06 ENCOUNTER — TELEPHONE (OUTPATIENT)
Age: 65
End: 2025-01-06

## 2025-01-06 NOTE — TELEPHONE ENCOUNTER
Pt. Calling to schedule US elastography, warm transfer initiated with central scheduling to further assist

## 2025-01-09 DIAGNOSIS — R80.9 MICROALBUMINURIA: ICD-10-CM

## 2025-01-09 DIAGNOSIS — N18.2 STAGE 2 CHRONIC KIDNEY DISEASE: ICD-10-CM

## 2025-01-09 RX ORDER — LISINOPRIL 2.5 MG/1
2.5 TABLET ORAL DAILY
Qty: 90 TABLET | Refills: 1 | Status: SHIPPED | OUTPATIENT
Start: 2025-01-09

## 2025-01-10 ENCOUNTER — TELEPHONE (OUTPATIENT)
Age: 65
End: 2025-01-10

## 2025-01-10 NOTE — LETTER
Medicine Instructions for Adults with Diabetes (NO Bowel Prep)      Follow these instructions when a BOWEL PREP is NOT required for your procedure or surgery!    NOTE:  GLP-1 Agonists taken weekly: do not take in the 7 days before your procedure  SGLT-2 Inhibitors: do not take in the 4 days before your procedure    On the Day Before Surgery/Procedure  If you are having a procedure (e.g. Colonoscopy) or surgery which DOES NOT require a bowel prep, follow the directions below based on the type of medicine you take for your diabetes.    Type of Medicine You Take Examples What to Do   Pre-Mixed Insulin - Intermediate Acting Humalog® 75/25, Humulin® 70/30, Novolog® 70/30, Regular Insulin Take ½ your regular dose the evening before your procedure.   Rapid/Fast Acting Insulin/Long-Acting Insulin Humalog® U200, NovoLog®, Apidra®, Lantus®, Levemir®, Tresiba®, Toujeo®, Fiasp®, Basaglar® Take your FULL regular dose the day before procedure.   Oral Diabetic Medicines (sulfonylurea) Glipizide/Glimepiride/Glucotrol® Take your regular dose with dinner the evening before your procedure.   Other Oral Diabetic Medicines   Metformin®, Glucophage®, Glucophage XR®, Riomet®, Glumetza®), Actose®, Avandia®, Glyset®, Prandin® Take your regular dose with dinner the evening before your procedure   GLP-1 Agonists Adlyxin®, Byetta®, Bydureon®, Ozempic®, Soliqua®, Tanzeum®, Trulicity®, Victoza®, Saxenda®, Rybelsus® If taken daily, take as normal    If taken weekly, do not take this medicine for 7 days before your procedure including the day of the procedure (resume taking after the procedure)   SGLT-2 Inhibitors Jardiance®, Invokana®, Farxiga®,   Steglatro®, Brenzavvy®, Qtern®, Segluromet®, Glyxambi®, Synjardy®, Synjardy XR®, Invokamet®, Invokamet XR®, Trijary XR®, Xigduo XR®, Steglujan® Do not take for 4 days before your procedure including the day of the procedure (resume taking after the procedure)                 More information continued  on back                  Medicine Instructions for Adults with Diabetes (No Bowel Prep)   Page 2      On the Day of Surgery/Procedure  Follow the directions below based on the type of medicine you take for your diabetes.    Type of Medicine You Take Examples What to Do   Long-Acting Insulin Lantus®, Levemir®, Tresiba®, Toujeo®, Basaglar®, Semglee® If you normally take your Long-Acting Insulin in the morning, take the full dose as scheduled.   GLP-1 Agonists AdlyxinÒ, ByettaÒ, BydureonÒ, OzempicÒ, SoliquaÒ, TanzeumÒ, TrulicityÒ, VictozaÒ, Saxenda®, Rybelsus® Do NOT take this medicine on the day of your procedure (resume taking after the procedure)       On the Day of Surgery/Procedure (continued)  Except for the morning Long-Acting Insulin, DO NOT take ANY diabetic medicine on the day of your procedure unless you were instructed by the doctor who manages your diabetes medicines.    Continue to check your blood sugars.  If you have an insulin pump, ask your endocrinologist for instructions at least 3 days before your procedure. NOTE: If you are not able to ask your endocrinologist in advance, on the day of the procedure set your insulin pump to your basal rate only. Bring your insulin pump supplies to the hospital.     If you have any questions about taking your diabetes medicines prior to your procedure, please contact the doctor who manages your diabetes medicines.January 10, 2025

## 2025-01-10 NOTE — TELEPHONE ENCOUNTER
Pt was calling back to say the diabetic instructions were not in her mychart. Pt refreshed the mychart and saw the instructions and had no further questions

## 2025-02-07 ENCOUNTER — HOSPITAL ENCOUNTER (OUTPATIENT)
Dept: ULTRASOUND IMAGING | Facility: HOSPITAL | Age: 65
End: 2025-02-07
Attending: INTERNAL MEDICINE
Payer: MEDICARE

## 2025-02-07 DIAGNOSIS — K76.0 FATTY LIVER: ICD-10-CM

## 2025-02-07 PROCEDURE — 76981 USE PARENCHYMA: CPT

## 2025-02-15 ENCOUNTER — RESULTS FOLLOW-UP (OUTPATIENT)
Age: 65
End: 2025-02-15

## 2025-03-04 ENCOUNTER — HOSPITAL ENCOUNTER (OUTPATIENT)
Dept: GASTROENTEROLOGY | Facility: HOSPITAL | Age: 65
Setting detail: OUTPATIENT SURGERY
Discharge: HOME | End: 2025-03-04
Attending: INTERNAL MEDICINE
Payer: MEDICARE

## 2025-03-04 ENCOUNTER — ANESTHESIA EVENT (OUTPATIENT)
Dept: GASTROENTEROLOGY | Facility: HOSPITAL | Age: 65
End: 2025-03-04
Payer: MEDICARE

## 2025-03-04 ENCOUNTER — ANESTHESIA (OUTPATIENT)
Dept: GASTROENTEROLOGY | Facility: HOSPITAL | Age: 65
End: 2025-03-04
Payer: MEDICARE

## 2025-03-04 VITALS
SYSTOLIC BLOOD PRESSURE: 123 MMHG | HEART RATE: 59 BPM | OXYGEN SATURATION: 96 % | WEIGHT: 212.08 LBS | DIASTOLIC BLOOD PRESSURE: 63 MMHG | BODY MASS INDEX: 34.08 KG/M2 | RESPIRATION RATE: 16 BRPM | TEMPERATURE: 97.3 F | HEIGHT: 66 IN

## 2025-03-04 DIAGNOSIS — K21.9 GASTROESOPHAGEAL REFLUX DISEASE WITHOUT ESOPHAGITIS: ICD-10-CM

## 2025-03-04 DIAGNOSIS — K22.70 BARRETT'S ESOPHAGUS WITHOUT DYSPLASIA: ICD-10-CM

## 2025-03-04 LAB — GLUCOSE SERPL-MCNC: 128 MG/DL (ref 65–140)

## 2025-03-04 PROCEDURE — 82948 REAGENT STRIP/BLOOD GLUCOSE: CPT

## 2025-03-04 PROCEDURE — 88305 TISSUE EXAM BY PATHOLOGIST: CPT | Performed by: PATHOLOGY

## 2025-03-04 RX ORDER — OMEPRAZOLE 20 MG/1
20 CAPSULE, DELAYED RELEASE ORAL DAILY
Qty: 90 CAPSULE | Refills: 3 | Status: SHIPPED | OUTPATIENT
Start: 2025-03-04

## 2025-03-04 RX ORDER — SODIUM CHLORIDE, SODIUM LACTATE, POTASSIUM CHLORIDE, CALCIUM CHLORIDE 600; 310; 30; 20 MG/100ML; MG/100ML; MG/100ML; MG/100ML
125 INJECTION, SOLUTION INTRAVENOUS CONTINUOUS
Status: DISCONTINUED | OUTPATIENT
Start: 2025-03-04 | End: 2025-03-08 | Stop reason: HOSPADM

## 2025-03-04 RX ORDER — PROPOFOL 10 MG/ML
INJECTION, EMULSION INTRAVENOUS AS NEEDED
Status: DISCONTINUED | OUTPATIENT
Start: 2025-03-04 | End: 2025-03-04

## 2025-03-04 RX ORDER — EPHEDRINE SULFATE 50 MG/ML
INJECTION INTRAVENOUS AS NEEDED
Status: DISCONTINUED | OUTPATIENT
Start: 2025-03-04 | End: 2025-03-04

## 2025-03-04 RX ORDER — SODIUM CHLORIDE, SODIUM LACTATE, POTASSIUM CHLORIDE, CALCIUM CHLORIDE 600; 310; 30; 20 MG/100ML; MG/100ML; MG/100ML; MG/100ML
INJECTION, SOLUTION INTRAVENOUS CONTINUOUS PRN
Status: DISCONTINUED | OUTPATIENT
Start: 2025-03-04 | End: 2025-03-04

## 2025-03-04 RX ORDER — PROPRANOLOL HCL 20 MG
20 TABLET ORAL EVERY 12 HOURS SCHEDULED
COMMUNITY

## 2025-03-04 RX ORDER — LIDOCAINE HYDROCHLORIDE 20 MG/ML
INJECTION, SOLUTION EPIDURAL; INFILTRATION; INTRACAUDAL; PERINEURAL AS NEEDED
Status: DISCONTINUED | OUTPATIENT
Start: 2025-03-04 | End: 2025-03-04

## 2025-03-04 RX ADMIN — PROPOFOL 50 MG: 10 INJECTION, EMULSION INTRAVENOUS at 07:29

## 2025-03-04 RX ADMIN — SODIUM CHLORIDE, SODIUM LACTATE, POTASSIUM CHLORIDE, AND CALCIUM CHLORIDE 125 ML/HR: .6; .31; .03; .02 INJECTION, SOLUTION INTRAVENOUS at 06:59

## 2025-03-04 RX ADMIN — SODIUM CHLORIDE, SODIUM LACTATE, POTASSIUM CHLORIDE, AND CALCIUM CHLORIDE: .6; .31; .03; .02 INJECTION, SOLUTION INTRAVENOUS at 07:18

## 2025-03-04 RX ADMIN — PROPOFOL 150 MG: 10 INJECTION, EMULSION INTRAVENOUS at 07:27

## 2025-03-04 RX ADMIN — EPHEDRINE SULFATE 15 MG: 50 INJECTION, SOLUTION INTRAVENOUS at 07:35

## 2025-03-04 RX ADMIN — PROPOFOL 50 MG: 10 INJECTION, EMULSION INTRAVENOUS at 07:28

## 2025-03-04 RX ADMIN — LIDOCAINE HYDROCHLORIDE 100 MG: 20 INJECTION, SOLUTION EPIDURAL; INFILTRATION; INTRACAUDAL; PERINEURAL at 07:27

## 2025-03-04 NOTE — ANESTHESIA PREPROCEDURE EVALUATION
Procedure:  EGD    Relevant Problems   ANESTHESIA (within normal limits)      CARDIO (within normal limits)   (-) MI (myocardial infarction) (HCC)      ENDO   (+) Acquired hypothyroidism   (+) Type 2 diabetes mellitus with microalbuminuria, without long-term current use of insulin (HCC)      GI/HEPATIC (within normal limits)  NPO confirmed  BMI 34.2      /RENAL   (+) Stage 2 chronic kidney disease      HEMATOLOGY (within normal limits)      NEURO/PSYCH   (-) CVA (cerebral vascular accident) (HCC)      PULMONARY   (+) NYLA (obstructive sleep apnea) (Denies CPAP)   (-) URI (upper respiratory infection)      Allergies   Allergen Reactions    Iv Contrast [Iodinated Contrast Media] Vomiting     Vomiting blood    Lamotrigine Hives and Rash     rash    Levofloxacin Hives and Rash     rash    Lurasidone Hives and Rash     rash    rash      unknown to patient    Dulaglutide Diarrhea    Iodine Strong Other (See Comments)     Pt. Stated had ct with contrast and had intense vomitting and blood spitting    Benztropine Diarrhea, Other (See Comments) and Headache     Insomnia, muscle weakness acathisia worsening    Brexpiprazole Other (See Comments)     Feels like something is crawling under her skin when she sits she starts uncontrollable movement to rock back and off     Escitalopram Other (See Comments)    Food Rash     Eggplant    Lumateperone Anxiety, Irritability and Other (See Comments)    Other Rash     Eggplant     Social History     Tobacco Use    Smoking status: Never     Passive exposure: Never    Smokeless tobacco: Never   Vaping Use    Vaping status: Never Used   Substance Use Topics    Alcohol use: Not Currently     Alcohol/week: 1.0 standard drink of alcohol     Types: 1 Glasses of wine per week     Comment: RARELY    Drug use: Never     Current Outpatient Medications   Medication Instructions    albuterol (Ventolin HFA) 90 mcg/act inhaler 2 puffs, Inhalation, Every 6 hours PRN    amantadine (SYMMETREL) 100 mg, 3  times daily    BD Pen Needle Alison 2nd Gen 32G X 4 MM MISC     clonazePAM (KLONOPIN) 1 mg, 2 times daily    ELDERBERRY PO Take by mouth    ergocalciferol (VITAMIN D2) 50,000 units No dose, route, or frequency recorded.    gabapentin (NEURONTIN) 300 mg, 2 times daily    Ingrezza 40 MG CAPS     levothyroxine 100 mcg, Oral, Daily    liothyronine (CYTOMEL) 25 mcg, Every other day    lisinopril (ZESTRIL) 2.5 mg, Oral, Daily    lithium carbonate (LITHOBID) 300 mg, Daily    Ozempic, 1 MG/DOSE, 4 MG/3ML injection pen     propranolol (INDERAL) 20 mg, Every 12 hours scheduled    senna (SENOKOT) 17.2 mg, Oral, Daily at bedtime    SUMAtriptan (IMITREX) 50 mg, Daily PRN    traZODone (DESYREL) 50 mg, Daily at bedtime    Vraylar 1.5 mg, Daily     Lab Results   Component Value Date    WBC 10.56 (H) 03/11/2024    HGB 15.5 (H) 03/11/2024    HCT 48.2 (H) 03/11/2024     03/11/2024    SODIUM 140 03/03/2025    K 4.6 03/03/2025     03/03/2025    CO2 30 03/03/2025    BUN 12 03/03/2025    CREATININE 0.95 03/03/2025    GLUC 131 (H) 03/03/2025    HGBA1C 5.8 (H) 12/27/2024    AST 13 03/03/2025    ALT 11 03/03/2025    ALKPHOS 46 03/03/2025    TBILI 1 03/03/2025    ALB 4.1 03/03/2025     Vitals:    03/04/25 0648   BP: 119/63   Pulse: 59   Resp: 18   Temp: (!) 97.2 °F (36.2 °C)   SpO2: 96%     Echo 9/27/23   Left Ventricle: Left ventricular cavity size is normal. Wall thickness is normal. The left ventricular ejection fraction is 60%. Systolic function is normal. Wall motion is normal. Diastolic function is mildly abnormal, consistent with grade I (abnormal) relaxation.     Physical Exam    Airway    Mallampati score: III  TM Distance: >3 FB  Neck ROM: full     Dental        Cardiovascular  Rhythm: regular, Rate: normal    Pulmonary   Breath sounds clear to auscultation    Other Findings  post-pubertal.      Anesthesia Plan  ASA Score- 2     Anesthesia Type- IV sedation with anesthesia with ASA Monitors.         Additional Monitors:      Airway Plan:     Comment: O2 mask, natural airway, EtCO2 monitor. Risks discussed including awareness, aspiration, drug reactions and conversion to GA..       Plan Factors-Exercise tolerance (METS): >4 METS.    Chart reviewed. EKG reviewed.  Existing labs reviewed. Patient summary reviewed.    Patient is not a current smoker.              Induction- intravenous.    Postoperative Plan-         Informed Consent- Anesthetic plan and risks discussed with patient.  I personally reviewed this patient with the CRNA. Discussed and agreed on the Anesthesia Plan with the CRNA..    NPO Status:  Vitals Value Taken Time   Date of last liquid 03/04/25 03/04/25 0647   Time of last liquid 0445 03/04/25 0647   Date of last solid 03/03/25 03/04/25 0647   Time of last solid 1830 03/04/25 0647

## 2025-03-04 NOTE — ANESTHESIA POSTPROCEDURE EVALUATION
Post-Op Assessment Note    CV Status:  Stable  Pain Score: 0    Pain management: adequate       Mental Status:  Alert   Hydration Status:  Euvolemic   PONV Controlled:  None   Airway Patency:  Patent     Post Op Vitals Reviewed: Yes    No anethesia notable event occurred.    Staff: CRNA           Last Filed PACU Vitals:  Vitals Value Taken Time   Temp 97.3 °F (36.3 °C) 03/04/25 0733   Pulse 56 03/04/25 0743   BP 87/54 03/04/25 0743   Resp 15 03/04/25 0743   SpO2 97 % 03/04/25 0743       Modified Rashida:     Vitals Value Taken Time   Activity 2 03/04/25 0743   Respiration 2 03/04/25 0743   Circulation 2 03/04/25 0743   Consciousness 1 03/04/25 0743   Oxygen Saturation 2 03/04/25 0743     Modified Rashida Score: 9

## 2025-03-04 NOTE — H&P
History and Physical - [facility]  Katlyn Frye 64 y.o. female MRN: [id number]                  HPI: Katlyn Frye is a 64 y.o. year old female who presents for egd for barretts.      REVIEW OF SYSTEMS: Per the HPI, and otherwise unremarkable.    Historical Information   Past Medical History:   Diagnosis Date    Arthritis     Bilateral knee    Asthma All my life; pretty mild these days    Bipolar 2 disorder (HCC) 1990    Chronic bronchitis (HCC)     Chronic kidney disease     Depression     Diabetes mellitus (HCC)     Disease of thyroid gland 2009    GERD (gastroesophageal reflux disease)     Headache(784.0) 1995    Pneumonia 2020    Shingles 2009    Sleep apnea, obstructive     Vertigo     05//2024     Past Surgical History:   Procedure Laterality Date    ABDOMINAL SURGERY  Oct 2007    COLONOSCOPY      DILATION AND CURETTAGE OF UTERUS      EGD      ELECTROCONVULSIVE THERAPY      HYSTERECTOMY  2007    KIDNEY SURGERY Right 2009    KNEE SURGERY  Right meniscus repair    NEPHRECTOMY  8/2/2009    SINUS SURGERY  1987     Social History   Social History     Substance and Sexual Activity   Alcohol Use Not Currently    Alcohol/week: 1.0 standard drink of alcohol    Types: 1 Glasses of wine per week    Comment: RARELY     Social History     Substance and Sexual Activity   Drug Use Never     Social History     Tobacco Use   Smoking Status Never    Passive exposure: Never   Smokeless Tobacco Never     Family History   Problem Relation Age of Onset    Heart disease Mother     Arthritis Mother     COPD Mother     Stroke Mother     Thyroid disease Mother     Glaucoma Mother     Colon cancer Father     Neuropathy Father     Cancer Father         Colon    Colon cancer Maternal Grandmother     Cancer Maternal Grandmother         Colon    Diabetes Maternal Grandfather     Mental illness Paternal Grandfather     Heart attack Brother     Breast cancer Cousin     Asthma Sister     Depression Sister     Mental  "illness Sister     Anxiety disorder Sister     Heart disease Brother          of sudden heart attack    Diabetes Maternal Aunt        Meds/Allergies       Current Outpatient Medications:     clonazePAM (KlonoPIN) 1 mg tablet    ELDERBERRY PO    ergocalciferol (VITAMIN D2) 50,000 units    gabapentin (NEURONTIN) 300 mg capsule    Ingrezza 40 MG CAPS    levothyroxine 100 mcg tablet    liothyronine (CYTOMEL) 25 mcg tablet    lisinopril (ZESTRIL) 2.5 mg tablet    lithium carbonate (LITHOBID) 300 mg CR tablet    propranolol (INDERAL) 20 mg tablet    senna (SENOKOT) 8.6 mg    traZODone (DESYREL) 50 mg tablet    Vraylar 1.5 MG capsule    albuterol (Ventolin HFA) 90 mcg/act inhaler    amantadine (SYMMETREL) 100 mg capsule    BD Pen Needle Alison 2nd Gen 32G X 4 MM MISC    Ozempic, 1 MG/DOSE, 4 MG/3ML injection pen    SUMAtriptan (IMITREX) 50 mg tablet    Current Facility-Administered Medications:     lactated ringers infusion, 125 mL/hr, Intravenous, Continuous    Allergies   Allergen Reactions    Iv Contrast [Iodinated Contrast Media] Vomiting     Vomiting blood    Lamotrigine Hives and Rash     rash    Levofloxacin Hives and Rash     rash    Lurasidone Hives and Rash     rash    rash      unknown to patient    Dulaglutide Diarrhea    Iodine Strong Other (See Comments)     Pt. Stated had ct with contrast and had intense vomitting and blood spitting    Benztropine Diarrhea, Other (See Comments) and Headache     Insomnia, muscle weakness acathisia worsening    Brexpiprazole Other (See Comments)     Feels like something is crawling under her skin when she sits she starts uncontrollable movement to rock back and off     Escitalopram Other (See Comments)    Food Rash     Eggplant    Lumateperone Anxiety, Irritability and Other (See Comments)    Other Rash     Eggplant       Objective     /63   Pulse 59   Temp (!) 97.2 °F (36.2 °C) (Temporal)   Resp 18   Ht 5' 6\" (1.676 m)   Wt 96.2 kg (212 lb 1.3 oz)   SpO2 96%   " BMI 34.23 kg/m²       PHYSICAL EXAM    Gen: NAD  Head: NCAT  CV: RRR  CHEST: Clear  ABD: soft, NT/ND  EXT: no edema      ASSESSMENT/PLAN:   Katlyn Frye is a 64 y.o. year old female who presents for egd for barretts. The patient is stable and optimized for the procedure, we reviewed risk and benefits. Risk include but not limited to infection, bleeding, perforation and missing a lesion.

## 2025-03-07 ENCOUNTER — OFFICE VISIT (OUTPATIENT)
Dept: NEUROLOGY | Facility: CLINIC | Age: 65
End: 2025-03-07
Payer: MEDICARE

## 2025-03-07 VITALS
HEART RATE: 64 BPM | WEIGHT: 212 LBS | OXYGEN SATURATION: 97 % | HEIGHT: 66 IN | DIASTOLIC BLOOD PRESSURE: 70 MMHG | BODY MASS INDEX: 34.07 KG/M2 | SYSTOLIC BLOOD PRESSURE: 100 MMHG

## 2025-03-07 DIAGNOSIS — G25.71 AKATHISIA: ICD-10-CM

## 2025-03-07 DIAGNOSIS — G24.01 TARDIVE DYSKINESIA: Primary | ICD-10-CM

## 2025-03-07 DIAGNOSIS — R26.89 IMBALANCE: ICD-10-CM

## 2025-03-07 DIAGNOSIS — H81.13 BENIGN PAROXYSMAL POSITIONAL VERTIGO DUE TO BILATERAL VESTIBULAR DISORDER: ICD-10-CM

## 2025-03-07 PROCEDURE — 99215 OFFICE O/P EST HI 40 MIN: CPT | Performed by: STUDENT IN AN ORGANIZED HEALTH CARE EDUCATION/TRAINING PROGRAM

## 2025-03-07 PROCEDURE — G2211 COMPLEX E/M VISIT ADD ON: HCPCS | Performed by: STUDENT IN AN ORGANIZED HEALTH CARE EDUCATION/TRAINING PROGRAM

## 2025-03-07 PROCEDURE — G2212 PROLONG OUTPT/OFFICE VIS: HCPCS | Performed by: STUDENT IN AN ORGANIZED HEALTH CARE EDUCATION/TRAINING PROGRAM

## 2025-03-07 RX ORDER — PROPRANOLOL HYDROCHLORIDE 10 MG/1
10 TABLET ORAL 3 TIMES DAILY PRN
Qty: 30 TABLET | Refills: 5 | Status: SHIPPED | OUTPATIENT
Start: 2025-03-07 | End: 2025-06-05

## 2025-03-07 NOTE — ASSESSMENT & PLAN NOTE
- continue propranolol 20mg BID  - continue clonazepam 1mg BID  - she can try 0.5mg Klonopin PRN or propranolol 10mg PRN for symptoms during the day  - counseled to hold propranolol if HR is <60 or SBP <100  Orders:    propranolol (INDERAL) 10 mg tablet; Take 1 tablet (10 mg total) by mouth 3 (three) times a day as needed (For akathisia)

## 2025-03-07 NOTE — PROGRESS NOTES
"Neurology Ambulatory Visit  Name: Katlyn Frye       : 1960       MRN: [id number]   Encounter Provider: Ayse Huerta MD   Encounter Date: 3/7/2025  Encounter department: [facility]    Katlyn Frye is a 64 y.o. female with PMH of bipolar disorder, DMII, hypothyroidism, stage 2 CKD, and NYLA who presents as KINJAL from Dr. Amin for tardive dyskinesia, tremor, and akathisia related to antipsychotic use. She has been off of antipsychotics since . Her symptoms are well controlled on Ingrezza, propranolol, and amantadine. Her most bothersome symptom currently is akathisia, for which she is already on propranolol and clonazepam, but she could try adding a PRN dose. I would not increase her maintenance dose of propranolol given her lower blood pressures.     Assessment & Plan  Tardive dyskinesia  With tremor  - continue Ingrezza 40mg  - continue amantadine 100mg BID    Akathisia  - continue propranolol 20mg BID  - continue clonazepam 1mg BID  - she can try 0.5mg Klonopin PRN or propranolol 10mg PRN for symptoms during the day  - counseled to hold propranolol if HR is <60 or SBP <100  Orders:    propranolol (INDERAL) 10 mg tablet; Take 1 tablet (10 mg total) by mouth 3 (three) times a day as needed (For akathisia)    Benign paroxysmal positional vertigo due to bilateral vestibular disorder  - s/p vestibular therapy (no longer covered by her insurance)       Imbalance  - recently completed course of physical therapy 2 weeks ago       RTC 6 mo       HISTORY OF PRESENT ILLNESS    Per my chart review:   She last saw Dr. Amin in 2024. \"Patient has underlying mood disorder requiring use of multiple antipsychotic regimens with recent concern was brought for tardive dyskinesia back in 2023. Patient was described as having restlessness with akathisia with intermittent tremors in left upper extremity and right lower extremity been noted.  Patient " "required hospitalization in September 2023 where she had medical regimen adjustment.  Patient has been taking Abilify, Seroquel and brexpiprazole; patient developed diarrhea to benztropine so she is taking clonazepam with Ingrezza and amantadine.  Patient doing reasonably well with no oral mandibular dyskinesia appreciated on her exam today.     Patient developed another problem in late April 2024 with bilateral BPPV diagnosis established by ENT team by doing a VNG study.  Patient has multiple sessions of vestibular therapy and she has improvement in her vertigo which last up to 3-4 seconds the patient still describes ambulatory dysfunction with concern for developing persistent postural-perceptual dizziness was brought during this visit.  Patient has abnormal pursuit and saccades during the brain just already and on my exam today.  Those findings could be multifactorial as well.\"      Per patient:  She says she was started on Ingrezza 40mg daily for bilateral hand tremors, and this medication has helped significantly. She also had a tardive dyskinesia with abnormal tongue movements, and akathisia, for which she takes propranolol. Her abnormal tongue movements have resolved. She denies any side effects from any of her medication, but on higher dose of Ingrezza (80mg) she had weakness in her legs. She currently takes amantadine twice a day, it is being weaned down by psychiatry since they are not sure it is needed.     For her vertigo, she tried vestibular therapy including the Epley maneuver and her vertigo did not go away, they were worried about a central cause and she had an MRI done, which was negative. She then underwent OT for \"saccadic eyes\" but her insurance stopped covering it. She still gets vertigo when she lies down for bed, a room-spinning sensation, which lasts a few minutes. She uses meclizine but doesn't use it much since her symptoms are short-lived.     She just completed a course of physical " therapy a few weeks ago, for balance.     Prior Work-up:   VNG 24:   Abnormal tracking and optokinetic  Kyle-Hallpike positive bilaterally but worse on the left side. Normal caloric irrigation.   MRI brain wo IAC 24: Normal   CTA H/N wwo 3/24/23: Normal.     Past Medical History:    Past Medical History:   Diagnosis Date    Arthritis     Bilateral knee    Asthma All my life; pretty mild these days    Bipolar 2 disorder (HCC)     Chronic bronchitis (HCC)     Chronic kidney disease     Depression     Diabetes mellitus (HCC)     Disease of thyroid gland     GERD (gastroesophageal reflux disease)     Headache(784.0)     Pneumonia     Shingles     Sleep apnea, obstructive     Vertigo     2024     Past Surgical History:   Procedure Laterality Date    ABDOMINAL SURGERY  Oct 2007    COLONOSCOPY      DILATION AND CURETTAGE OF UTERUS      EGD      ELECTROCONVULSIVE THERAPY      HYSTERECTOMY  2007    KIDNEY SURGERY Right 2009    KNEE SURGERY  Right meniscus repair    NEPHRECTOMY  2009    SINUS SURGERY  1987       Family History:  Family History   Problem Relation Age of Onset    Heart disease Mother     Arthritis Mother     COPD Mother     Stroke Mother     Thyroid disease Mother     Glaucoma Mother     Colon cancer Father     Neuropathy Father     Cancer Father         Colon    Colon cancer Maternal Grandmother     Cancer Maternal Grandmother         Colon    Diabetes Maternal Grandfather     Mental illness Paternal Grandfather     Heart attack Brother     Breast cancer Cousin     Asthma Sister     Depression Sister     Mental illness Sister     Anxiety disorder Sister     Heart disease Brother          of sudden heart attack    Diabetes Maternal Aunt        Social History:  Social History     Tobacco Use    Smoking status: Never     Passive exposure: Never    Smokeless tobacco: Never   Vaping Use    Vaping status: Never Used   Substance Use Topics    Alcohol use: Not Currently      Alcohol/week: 1.0 standard drink of alcohol     Types: 1 Glasses of wine per week     Comment: RARELY    Drug use: Never          Allergies:  Allergies   Allergen Reactions    Iv Contrast [Iodinated Contrast Media] Vomiting     Vomiting blood    Lamotrigine Hives and Rash     rash    Levofloxacin Hives and Rash     rash    Lurasidone Hives and Rash     rash    rash      unknown to patient    Dulaglutide Diarrhea    Iodine Strong Other (See Comments)     Pt. Stated had ct with contrast and had intense vomitting and blood spitting    Benztropine Diarrhea, Other (See Comments) and Headache     Insomnia, muscle weakness acathisia worsening    Brexpiprazole Other (See Comments)     Feels like something is crawling under her skin when she sits she starts uncontrollable movement to rock back and off     Escitalopram Other (See Comments)    Food Rash     Eggplant    Lumateperone Anxiety, Irritability and Other (See Comments)    Other Rash     Eggplant       Medications:    Current Outpatient Medications:     albuterol (Ventolin HFA) 90 mcg/act inhaler, Inhale 2 puffs every 6 (six) hours as needed for wheezing, Disp: 18 g, Rfl: 5    amantadine (SYMMETREL) 100 mg capsule, Take 100 mg by mouth 2 (two) times a day, Disp: , Rfl:     BD Pen Needle Alison 2nd Gen 32G X 4 MM MISC, , Disp: , Rfl:     clonazePAM (KlonoPIN) 1 mg tablet, Take 1 mg by mouth 2 (two) times a day, Disp: , Rfl:     ergocalciferol (VITAMIN D2) 50,000 units, , Disp: , Rfl:     gabapentin (NEURONTIN) 300 mg capsule, Take 300 mg by mouth 2 (two) times a day, Disp: , Rfl:     Ingrezza 40 MG CAPS, , Disp: , Rfl:     liothyronine (CYTOMEL) 25 mcg tablet, Take 25 mcg by mouth every other day, Disp: , Rfl:     lisinopril (ZESTRIL) 2.5 mg tablet, TAKE 1 TABLET BY MOUTH EVERY DAY, Disp: 90 tablet, Rfl: 1    lithium carbonate (LITHOBID) 300 mg CR tablet, 300 mg in the morning 2 tabs daily, Disp: , Rfl:     omeprazole (PriLOSEC) 20 mg delayed release capsule, Take 1  "capsule (20 mg total) by mouth daily, Disp: 90 capsule, Rfl: 3    Ozempic, 1 MG/DOSE, 4 MG/3ML injection pen, , Disp: , Rfl:     propranolol (INDERAL) 10 mg tablet, Take 1 tablet (10 mg total) by mouth 3 (three) times a day as needed (For akathisia), Disp: 30 tablet, Rfl: 5    propranolol (INDERAL) 20 mg tablet, Take 20 mg by mouth every 12 (twelve) hours, Disp: , Rfl:     senna (SENOKOT) 8.6 mg, Take 2 tablets (17.2 mg total) by mouth daily at bedtime, Disp: 60 tablet, Rfl: 11    SUMAtriptan (IMITREX) 50 mg tablet, Take 50 mg by mouth daily as needed, Disp: , Rfl:     traZODone (DESYREL) 50 mg tablet, Take 50 mg by mouth daily at bedtime, Disp: , Rfl:     Vraylar 1.5 MG capsule, Take 1.5 mg by mouth daily, Disp: , Rfl:     ELDERBERRY PO, Take by mouth (Patient not taking: Reported on 3/7/2025), Disp: , Rfl:     levothyroxine 100 mcg tablet, Take 1 tablet (100 mcg total) by mouth daily, Disp: 90 tablet, Rfl: 1  No current facility-administered medications for this visit.    Facility-Administered Medications Ordered in Other Visits:     lactated ringers infusion, 125 mL/hr, Intravenous, Continuous, Shantal Elizalde MD, Last Rate: 125 mL/hr at 03/04/25 0659, 125 mL/hr at 03/04/25 0659      OBJECTIVE  /70 (BP Location: Left arm, Patient Position: Sitting, Cuff Size: Large)   Pulse 64   Ht 5' 6\" (1.676 m)   Wt 96.2 kg (212 lb)   SpO2 97%   BMI 34.22 kg/m²      Labs  I have reviewed pertinent labs:  CBC:   Lab Results   Component Value Date    WBC 10.56 (H) 03/11/2024    RBC 5.07 03/11/2024    HGB 15.5 (H) 03/11/2024    HCT 48.2 (H) 03/11/2024    MCV 95 03/11/2024     03/11/2024    MCH 30.6 03/11/2024    MCHC 32.2 03/11/2024    RDW 13.7 03/11/2024    MPV 12.2 03/11/2024    NEUTROABS 7.67 (H) 03/11/2024     CMP:   Lab Results   Component Value Date    SODIUM 140 03/03/2025    K 4.6 03/03/2025     03/03/2025    CO2 30 03/03/2025    AGAP 6 03/03/2025    BUN 12 03/03/2025    CREATININE 0.95 03/03/2025    " GLUC 131 (H) 03/03/2025    GLUF 112 (H) 12/27/2024    CALCIUM 9.3 03/03/2025    AST 13 03/03/2025    ALT 11 03/03/2025    ALKPHOS 46 03/03/2025    TP 6.7 03/03/2025    ALB 4.1 03/03/2025    TBILI 1 03/03/2025    EGFR 66 03/03/2025       Lab Results   Component Value Date/Time    UILNLQJI82 1,304 (H) 06/24/2024 01:52 PM    TSH 0.46 08/31/2023 01:37 PM    PGL8PLPGXVFO 0.302 (L) 12/27/2024 12:55 PM    FREET4 0.84 12/27/2024 12:55 PM    HGBA1C 5.8 (H) 12/27/2024 12:55 PM    HGBA1C 5.7 (H) 08/28/2023 07:49 AM    FOLATE 5.6 (L) 06/24/2024 01:52 PM    COPPER 104 06/24/2024 01:52 PM    CRP 1.1 02/04/2024 05:38 PM            General Exam  GENERAL APPEARANCE:  No distress, alert, interactive and cooperative.  CARDIOVASCULAR: Warm and well perfused  LUNGS: normal work of breathing on room air  EXTREMITIES: no peripheral edema     Neurologic Exam  MENTAL STATE:  Orientation was normal to time, place and person. Recent and remote memory was intact.  Attention span and concentration were normal. Language testing was normal for comprehension, repetition, expression, and naming.      CRANIAL NERVES:  CN 3, 4, 6  Pupils round, 4 mm in diameter, equally reactive to light. Lids symmetric; no ptosis. EOMs normal alignment, full range. No nystagmus.Horizontal saccades were normal; there is slightly low saccades with upgaze.   CN 5  Facial sensation intact bilaterally.  CN 7  Normal and symmetric facial strength.   CN 8  Hearing intact to conversation.  CN 9  Palate elevates symmetrically.     MOTOR:  Muscle bulk and tone were normal in both upper and lower extremities except for increased tone in her neck. Muscle strength was 5-/5 in distal and proximal muscles in both upper and lower extremities. There is a jerky tremor of both upper extremities with posture, and a resting chin tremor noted.      REFLEXES:  RIGHT UE   LEFT UE  BR:2              BR:2    Biceps:2      Biceps:2       RIGHT LLE   LEFT LLE    Knee:0           Knee:0     Ankle:0         Ankle:0    No clonus.     SENSORY:  In both upper and lower extremities, sensation was intact to light touch.     COORDINATION:   In both upper extremities, finger-nose-finger was intact without dysmetria or overshoot. In both lower extremities, heel-to-shin was slow but intact.      GAIT:  Station was stable with a normal base. +Romberg, unable to perform tandem gait.     Administrative Statements   The total amount of time spent with the patient and on chart review and documentation was 55 minutes. Issues addressed during this visit included counseling on diagnosis and prognosis, counseling on medical management, and counseling on medication side effects.

## 2025-03-13 PROCEDURE — 88305 TISSUE EXAM BY PATHOLOGIST: CPT | Performed by: PATHOLOGY

## 2025-03-17 ENCOUNTER — TELEPHONE (OUTPATIENT)
Dept: ADMINISTRATIVE | Facility: OTHER | Age: 65
End: 2025-03-17

## 2025-03-17 ENCOUNTER — OFFICE VISIT (OUTPATIENT)
Age: 65
End: 2025-03-17
Payer: MEDICARE

## 2025-03-17 ENCOUNTER — APPOINTMENT (OUTPATIENT)
Age: 65
End: 2025-03-17
Payer: MEDICARE

## 2025-03-17 ENCOUNTER — RESULTS FOLLOW-UP (OUTPATIENT)
Dept: GASTROENTEROLOGY | Facility: CLINIC | Age: 65
End: 2025-03-17

## 2025-03-17 VITALS
DIASTOLIC BLOOD PRESSURE: 64 MMHG | TEMPERATURE: 98.1 F | RESPIRATION RATE: 16 BRPM | BODY MASS INDEX: 34.62 KG/M2 | HEART RATE: 57 BPM | OXYGEN SATURATION: 97 % | WEIGHT: 215.4 LBS | HEIGHT: 66 IN | SYSTOLIC BLOOD PRESSURE: 120 MMHG

## 2025-03-17 DIAGNOSIS — E11.29 TYPE 2 DIABETES MELLITUS WITH MICROALBUMINURIA, WITHOUT LONG-TERM CURRENT USE OF INSULIN (HCC): Primary | ICD-10-CM

## 2025-03-17 DIAGNOSIS — M13.172: ICD-10-CM

## 2025-03-17 DIAGNOSIS — E66.01 MORBID OBESITY WITH BMI OF 40.0-44.9, ADULT (HCC): ICD-10-CM

## 2025-03-17 DIAGNOSIS — G25.71 AKATHISIA: ICD-10-CM

## 2025-03-17 DIAGNOSIS — G43.809 OTHER MIGRAINE WITHOUT STATUS MIGRAINOSUS, NOT INTRACTABLE: ICD-10-CM

## 2025-03-17 DIAGNOSIS — F31.4 BIPOLAR 1 DISORDER, DEPRESSED, SEVERE (HCC): ICD-10-CM

## 2025-03-17 DIAGNOSIS — R80.9 TYPE 2 DIABETES MELLITUS WITH MICROALBUMINURIA, WITHOUT LONG-TERM CURRENT USE OF INSULIN (HCC): Primary | ICD-10-CM

## 2025-03-17 PROBLEM — D58.2 ELEVATED HEMOGLOBIN (HCC): Status: RESOLVED | Noted: 2024-12-12 | Resolved: 2025-03-17

## 2025-03-17 LAB
CHOLEST SERPL-MCNC: 162 MG/DL
CHOLEST/HDLC SERPL: 4.4 {RATIO}
HDLC SERPL-MCNC: 37 MG/DL (ref 23–92)
LDLC SERPL CALC-MCNC: 100 MG/DL
NONHDLC SERPL-MCNC: 125 MG/DL
TRIGL SERPL-MCNC: 125 MG/DL

## 2025-03-17 PROCEDURE — 73630 X-RAY EXAM OF FOOT: CPT

## 2025-03-17 PROCEDURE — G2211 COMPLEX E/M VISIT ADD ON: HCPCS | Performed by: FAMILY MEDICINE

## 2025-03-17 PROCEDURE — 99214 OFFICE O/P EST MOD 30 MIN: CPT | Performed by: FAMILY MEDICINE

## 2025-03-17 NOTE — PROGRESS NOTES
"Name: Katlyn Frye      : 1960      MRN: [id number]  Encounter Provider: Susie Lamb DO  Encounter Date: 3/17/2025   Encounter department: [facility] [place]  :  Assessment & Plan  Bipolar 1 disorder, depressed, severe (HCC)  Symptoms are stable on current antipsychotics.  Patient follows with psychiatrist in [place]       Morbid obesity with BMI of 40.0-44.9, adult (HCC)  Pt actively losing well with lifestyle monidcation and GLP1 agnosit          Type 2 diabetes mellitus with microalbuminuria, without long-term current use of insulin ([facility])  Well-controlled on GLP-1 agonist.  Patient takes Ozempic 1 mg q. weekly  Lab Results   Component Value Date    HGBA1C 5.8 (H) 2024       Orders:    Lipid Panel with Direct LDL reflex; Future    Akathisia  Mild symptomatology.  Follows with neurology.  Using as needed propranolol to help with symptoms       Other migraine without status migrainosus, not intractable  Longstanding issue.  Follows with neurology.  Alleviated with as needed triptan              History of Present Illness   Patient presents for follow-up.      Review of Systems   Respiratory:  Negative for shortness of breath.    Cardiovascular:  Negative for chest pain and palpitations.   Gastrointestinal:  Positive for constipation (Discharge send). Negative for diarrhea.   Musculoskeletal:  Negative for back pain and gait problem.   Neurological:  Positive for headaches (Alleviated with triptan therapy).       Objective   /64 (BP Location: Right arm, Patient Position: Sitting, Cuff Size: Standard)   Pulse 57   Temp 98.1 °F (36.7 °C) (Tympanic)   Resp 16   Ht 5' 6\" (1.676 m)   Wt 97.7 kg (215 lb 6.4 oz)   SpO2 97%   BMI 34.77 kg/m²      Physical Exam  HENT:      Head: Normocephalic.      Right Ear: External ear normal.      Left Ear: External ear normal.   Eyes:      Conjunctiva/sclera: Conjunctivae normal.   Cardiovascular:      Rate and Rhythm: Normal rate " and regular rhythm.   Pulmonary:      Effort: Pulmonary effort is normal.      Breath sounds: Normal breath sounds.   Abdominal:      General: Bowel sounds are normal.      Palpations: Abdomen is soft.   Musculoskeletal:      Right lower leg: No edema.      Left lower leg: No edema.   Neurological:      Mental Status: She is alert and oriented to person, place, and time.      Gait: Gait normal.   Psychiatric:         Attention and Perception: Attention normal.         Mood and Affect: Mood normal. Affect is flat.         Speech: Speech normal.         Behavior: Behavior is cooperative.         Thought Content: Thought content does not include homicidal or suicidal ideation.

## 2025-03-17 NOTE — TELEPHONE ENCOUNTER
Upon review of the In Basket request and the patient's chart, initial outreach has been made via fax to facility. Please see Contacts section for details.     Thank you  Osiel Martins MA

## 2025-03-17 NOTE — TELEPHONE ENCOUNTER
----- Message from Carley GARRETT sent at 3/17/2025 11:07 AM EDT -----  Regarding: Diabetic foot exam  03/17/25 11:08 AM    Hello, our patient Katlyn Frye has had Diabetic Foot Exam completed/performed. Please assist in updating the patient chart by making an External outreach to [facility] DPM facility located in [address] [place] [address] [phone]. The date of service is 2025.    Thank you,  Carley Bojorquez   PRIMARY CARE [place]

## 2025-03-17 NOTE — ASSESSMENT & PLAN NOTE
Well-controlled on GLP-1 agonist.  Patient takes Ozempic 1 mg q. weekly  Lab Results   Component Value Date    HGBA1C 5.8 (H) 12/27/2024       Orders:    Lipid Panel with Direct LDL reflex; Future

## 2025-03-17 NOTE — LETTER
Diabetic Foot Exam Form    Date Requested: 25  Patient: Katlyn Frye  Patient : 1960   Referring Provider: Susie Lamb DO    Diabetic Foot Exam Performed with shoes and socks removed        Yes         No     Date of Diabetic Foot Exam ______________________________  Risk Score ____________________________________________    Left Foot       Visual Inspection         Monofilament Testing Sensory Exam        Pedal Pulses         Additional Comments         Right Foot      Visual Inspection         Monofilament Testing Sensory Exam       Pedal Pulses         Additional Comments         Comments __________________________________________________________    Practice Providing Exam ______________________________________________    Exam Performed By (print name) _______________________________________      Provider Signature ___________________________________________________      These reports are needed for  compliance.    Please fax this completed form and a copy of the Diabetic Foot Exam report to the [facility] Department as soon as possible via Fax [phone], attention [place]: Phone [phone]. Our office is located at [address] [place] [address].    We thank you for your assistance in treating our mutual patient.   [facility] - [phone] F [phone]

## 2025-03-17 NOTE — ASSESSMENT & PLAN NOTE
Mild symptomatology.  Follows with neurology.  Using as needed propranolol to help with symptoms

## 2025-03-17 NOTE — LETTER
Diabetic Foot Exam Form    Date Requested: 25  Patient: Katlyn Frye  Patient : 1960   Referring Provider: Susie Lamb DO    Diabetic Foot Exam Performed with shoes and socks removed        Yes         No     Date of Diabetic Foot Exam ______________________________  Risk Score ____________________________________________    Left Foot       Visual Inspection         Monofilament Testing Sensory Exam        Pedal Pulses         Additional Comments         Right Foot      Visual Inspection         Monofilament Testing Sensory Exam       Pedal Pulses         Additional Comments         Comments __________________________________________________________    Practice Providing Exam ______________________________________________    Exam Performed By (print name) _______________________________________      Provider Signature ___________________________________________________      These reports are needed for  compliance.    Please fax this completed form and a copy of the Diabetic Foot Exam report to our office located at [address] [place] [address] as soon as possible via Fax [phone] attention [place]: Phone [phone]    We thank you for your assistance in treating our mutual patient.   [facility] - [phone] F [phone]

## 2025-03-17 NOTE — ASSESSMENT & PLAN NOTE
Symptoms are stable on current antipsychotics.  Patient follows with psychiatrist in [place]

## 2025-03-18 ENCOUNTER — RESULTS FOLLOW-UP (OUTPATIENT)
Age: 65
End: 2025-03-18

## 2025-03-18 LAB
T3 SERPL-MCNC: 1.62 NG/ML (ref 0.87–1.78)
TSH SERPL-ACNC: 2.39 UIU/ML (ref 0.45–5.33)

## 2025-03-18 NOTE — TELEPHONE ENCOUNTER
----- Message from Susie Lamb DO sent at 3/18/2025  4:22 PM EDT -----  Please notify pt that her thyroid levels are normal

## 2025-03-18 NOTE — TELEPHONE ENCOUNTER
----- Message from Susie Lamb DO sent at 3/18/2025  9:03 AM EDT -----  Please notify pt that cholesterol level is good

## 2025-03-25 NOTE — TELEPHONE ENCOUNTER
As a follow-up, a second attempt has been made for outreach via fax to facility. Please see Contacts section for details.    Thank you  Osiel Martins MA

## 2025-03-26 NOTE — TELEPHONE ENCOUNTER
Upon review of the In Basket request we were able to locate, review, and update the patient chart as requested for Diabetic Foot Exam.    Any additional questions or concerns should be emailed to the Practice Liaisons via the appropriate education email address, please do not reply via In Basket.    Thank you  Osiel Martins MA   [facility]

## 2025-03-27 ENCOUNTER — NURSE TRIAGE (OUTPATIENT)
Age: 65
End: 2025-03-27

## 2025-03-27 NOTE — TELEPHONE ENCOUNTER
"Pt. Started on Omeprazole 1 month ago and is asking for alternative medication as she has been having hot flashes and would like to switch to something else, please advise   Reason for Disposition   Caller wants to use a complementary or alternative medicine    Answer Assessment - Initial Assessment Questions  1. NAME of MEDICINE: \"What medicine(s) are you calling about?\"      Omeprazole 20 mg daily   2. QUESTION: \"What is your question?\" (e.g., double dose of medicine, side effect)      Pt. Asking for alternative as she is suffering from hot flashes as a side effect   3. PRESCRIBER: \"Who prescribed the medicine?\" Reason: if prescribed by specialist, call should be referred to that group.      Dr. Elizalde   4. SYMPTOMS: \"Do you have any symptoms?\" If Yes, ask: \"What symptoms are you having?\"  \"How bad are the symptoms (e.g., mild, moderate, severe)      Hot flashes  5. PREGNANCY:  \"Is there any chance that you are pregnant?\" \"When was your last menstrual period?\"      Postmeno    Protocols used: Medication Question Call-Adult-[place]    "

## 2025-03-28 DIAGNOSIS — K21.9 GASTROESOPHAGEAL REFLUX DISEASE WITHOUT ESOPHAGITIS: Primary | ICD-10-CM

## 2025-03-28 DIAGNOSIS — K22.70 BARRETT'S ESOPHAGUS WITHOUT DYSPLASIA: ICD-10-CM

## 2025-03-28 RX ORDER — PANTOPRAZOLE SODIUM 20 MG/1
20 TABLET, DELAYED RELEASE ORAL DAILY
Qty: 90 TABLET | Refills: 3 | Status: SHIPPED | OUTPATIENT
Start: 2025-03-28

## 2025-04-09 ENCOUNTER — NURSE TRIAGE (OUTPATIENT)
Age: 65
End: 2025-04-09

## 2025-04-09 NOTE — TELEPHONE ENCOUNTER
"FOLLOW UP: provider advisement     REASON FOR CONVERSATION: Medication Reaction    SYMPTOMS: joint pain all day since switching to Pantoprazole, hot flashes improved since stopping Omeprazole     OTHER: on Pantoprazole for 1 week     DISPOSITION: Callback by PCP Today            Reason for Disposition   Caller has NON-URGENT medicine question about med that PCP or specialist prescribed and triager unable to answer question    Answer Assessment - Initial Assessment Questions  1. NAME of MEDICINE: \"What medicine(s) are you calling about?\"      Pantoprazole   2. QUESTION: \"What is your question?\" (e.g., double dose of medicine, side effect)      Pt. Started  Pantoprazole last week and reports she has joint pain, hot flashes from Omeprazole have improved but since switching to Pantoprazole she is now having joint pain  3. PRESCRIBER: \"Who prescribed the medicine?\" Reason: if prescribed by specialist, call should be referred to that group.      Dr. Elizalde   4. SYMPTOMS: \"Do you have any symptoms?\" If Yes, ask: \"What symptoms are you having?\"  \"How bad are the symptoms (e.g., mild, moderate, severe)      Joint pain    Protocols used: Medication Question Call-Adult-[place]    "

## 2025-04-09 NOTE — TELEPHONE ENCOUNTER
I doubt the joint pain is from the pantoprazole.  This would be very atypical.  I would continue the pantoprazole and follow-up with PCP if joint pain does not resolve.  We can consider switching to a different PPI in the future.  Thank you.

## 2025-04-14 ENCOUNTER — TELEPHONE (OUTPATIENT)
Age: 65
End: 2025-04-14

## 2025-04-14 DIAGNOSIS — N18.2 STAGE 2 CHRONIC KIDNEY DISEASE: Primary | ICD-10-CM

## 2025-04-16 ENCOUNTER — OFFICE VISIT (OUTPATIENT)
Dept: OBGYN CLINIC | Facility: CLINIC | Age: 65
End: 2025-04-16
Payer: MEDICARE

## 2025-04-16 VITALS — WEIGHT: 218 LBS | BODY MASS INDEX: 35.03 KG/M2 | HEIGHT: 66 IN

## 2025-04-16 DIAGNOSIS — M17.0 PRIMARY OSTEOARTHRITIS OF BOTH KNEES: Primary | ICD-10-CM

## 2025-04-16 PROCEDURE — 20610 DRAIN/INJ JOINT/BURSA W/O US: CPT | Performed by: FAMILY MEDICINE

## 2025-04-16 PROCEDURE — 99213 OFFICE O/P EST LOW 20 MIN: CPT | Performed by: FAMILY MEDICINE

## 2025-04-16 RX ORDER — LIDOCAINE HYDROCHLORIDE 10 MG/ML
4 INJECTION, SOLUTION INFILTRATION; PERINEURAL
Status: COMPLETED | OUTPATIENT
Start: 2025-04-16 | End: 2025-04-16

## 2025-04-16 RX ORDER — TRIAMCINOLONE ACETONIDE 40 MG/ML
40 INJECTION, SUSPENSION INTRA-ARTICULAR; INTRAMUSCULAR
Status: COMPLETED | OUTPATIENT
Start: 2025-04-16 | End: 2025-04-16

## 2025-04-16 RX ADMIN — LIDOCAINE HYDROCHLORIDE 4 ML: 10 INJECTION, SOLUTION INFILTRATION; PERINEURAL at 11:15

## 2025-04-16 RX ADMIN — TRIAMCINOLONE ACETONIDE 40 MG: 40 INJECTION, SUSPENSION INTRA-ARTICULAR; INTRAMUSCULAR at 11:15

## 2025-04-16 NOTE — PROGRESS NOTES
Subjective:    Chief Complaint   Patient presents with    Right Knee - Clicking, Swelling, Pain, Follow-up     More swelling in  the mornings    Left Knee - Clicking, Swelling, Pain, Follow-up       Katlyn Frye is a 64 y.o. female complains of bilateral knee pain. Onset of the symptoms was  several years .  Mechanism of injury:  no recent injury reported . Aggravating factors: walking  and weight bearing. Treatment to date:  visco and CSI which were somewhat effective . Symptoms have progressed to a point and plateaued.      The following portions were reviewed and updated as needed: allergies, current medications, past medical history, past social history, past surgical history and problem list.    Review of Systems   Constitutional: Negative for fever.   HENT: Negative for dental problem and headaches.    Eyes: Negative for vision loss.   Respiratory: Negative for cough and shortness of breath.    Cardiovascular: Negative for leg swelling and palpitations.   Gastrointestinal: Negative for constipation and diarrhea.   Genitourinary: Negative for bladder incontinence and difficulty urinating.   Musculoskeletal: Negative for back pain and difficulty walking.   Skin: Negative for rash and ulcer.   Neurological: Negative for dizziness and headaches.   Hem/Lymph/Immuno: Negative for blood clots. Does not bruise/bleed easily.   Psychiatric/Behavioral: Negative for confusion.         Objective:  General: no acute distress, non toxic, AAO x3   Skin: no skin changes, no rashes, no wounds or laceration  Vasculature: normal cap refill, no LE edema, normal popliteal and dorsalis pedis pulse  Neurologic:   Musculoskeletal: right KNEE EXAM  Gait: limping gait negative, able to weight bear without difficulty  Inspection: No gross deformity, no redness or warmth   Effusion: negative   Medial joint line TTP: positive  Lateral joint line TTP: negative  ROM: Full flexion and extension  [facility]'s: negative,   Instability to  varus/valgus stress: negative  Anterior Drawer: negative   Lachman's test: negative  Posterior Drawer: negative          Imaging:       Assessment/Plan:    1. Primary osteoarthritis of both knees (Primary)  Repeat CSI provided at today office visit for bilateral knee joint.   Patient will follow up as needed if symptoms recur.   Can consider repeating visco injection therapy in future   - Large joint arthrocentesis: bilateral knee

## 2025-04-21 ENCOUNTER — NURSE TRIAGE (OUTPATIENT)
Age: 65
End: 2025-04-21

## 2025-04-21 NOTE — TELEPHONE ENCOUNTER
"FOLLOW UP: She is going to reach out to Neurology as her heart rate is low due to her inderal, but patient is having a flare of Akathisia.     REASON FOR CONVERSATION: Dizziness    SYMPTOMS: Saw her heart rate was 40-50 on her watch. Did feel dizzy and lightheaded. Has no chest pain or blurry vision currently, no shortness of breath. States that her neurologist told her that the Inderal can cause low heart rate and to hold if below 60. However, she states she's having a flare of Akathisia and is looking what to take for relief. Patients blood pressure is 155/83, Hr currently on phone is 53, last dose of medication was at 1030.    OTHER: Advised to not take any more of the propranolol as this is the cause of her bradycardia, Advised to monitor her heart rate and blood pressure and to increase her fluid consumption. She declined an appointment today in the office. Advised to call her neurologist as they are the ones who are managing this medication and see what they would recommend for her since she is in a flare. She will call back with any further concerns.     DISPOSITION:  Now (overriding See Today in Office)        Reason for Disposition   Taking water pill (i.e., diuretic) or heart medication (e.g., digoxin)    Answer Assessment - Initial Assessment Questions  1. DESCRIPTION: \"Please describe your heart rate or heartbeat that you are having\" (e.g., fast/slow, regular/irregular, skipped or extra beats, \"palpitations\")      *No Answer*  2. ONSET: \"When did it start?\" (e.g., minutes, hours, days)       *No Answer*  3. DURATION: \"How long does it last\" (e.g., seconds, minutes, hours)      *No Answer*  4. PATTERN \"Does it come and go, or has it been constant since it started?\"  \"Does it get worse with exertion?\"   \"Are you feeling it now?\"      *No Answer*  5. TAP: \"Using your hand, can you tap out what you are feeling on a chair or table in front of you, so that I can hear?\" Note: Not all patients can " "do this.        *No Answer*  6. HEART RATE: \"Can you tell me your heart rate?\" \"How many beats in 15 seconds?\"  Note: Not all patients can do this.        49-50  7. RECURRENT SYMPTOM: \"Have you ever had this before?\" If Yes, ask: \"When was the last time?\" and \"What happened that time?\"       *No Answer*  8. CAUSE: \"What do you think is causing the palpitations?\"      *No Answer*  9. CARDIAC HISTORY: \"Do you have any history of heart disease?\" (e.g., heart attack, angina, bypass surgery, angioplasty, arrhythmia)       *No Answer*  10. OTHER SYMPTOMS: \"Do you have any other symptoms?\" (e.g., dizziness, chest pain, sweating, difficulty breathing)        Dizziness and lightheaded  11. PREGNANCY: \"Is there any chance you are pregnant?\" \"When was your last menstrual period?\"        *No Answer*    Protocols used: Heart Rate and Heartbeat Questions-Adult-[place]    "

## 2025-05-27 ENCOUNTER — TELEPHONE (OUTPATIENT)
Age: 65
End: 2025-05-27

## 2025-05-27 NOTE — TELEPHONE ENCOUNTER
Pt called to schedule appt due to experiencing episode of full body spasms the other day. Pt was offered first available appt on July 16th but stated she needed something sooner and would like to speak with Dr. Huerta in the meantime to discuss.

## 2025-05-31 DIAGNOSIS — N18.2 STAGE 2 CHRONIC KIDNEY DISEASE: ICD-10-CM

## 2025-05-31 DIAGNOSIS — R80.9 MICROALBUMINURIA: ICD-10-CM

## 2025-06-02 RX ORDER — LISINOPRIL 2.5 MG/1
2.5 TABLET ORAL DAILY
Qty: 90 TABLET | Refills: 1 | Status: SHIPPED | OUTPATIENT
Start: 2025-06-02

## 2025-06-03 ENCOUNTER — HOSPITAL ENCOUNTER (EMERGENCY)
Facility: HOSPITAL | Age: 65
Discharge: HOME | End: 2025-06-03
Attending: EMERGENCY MEDICINE | Admitting: EMERGENCY MEDICINE
Payer: MEDICARE

## 2025-06-03 ENCOUNTER — NURSE TRIAGE (OUTPATIENT)
Age: 65
End: 2025-06-03

## 2025-06-03 ENCOUNTER — APPOINTMENT (EMERGENCY)
Dept: CT IMAGING | Facility: HOSPITAL | Age: 65
End: 2025-06-03
Payer: MEDICARE

## 2025-06-03 VITALS
RESPIRATION RATE: 20 BRPM | OXYGEN SATURATION: 97 % | SYSTOLIC BLOOD PRESSURE: 91 MMHG | HEART RATE: 55 BPM | TEMPERATURE: 97.5 F | DIASTOLIC BLOOD PRESSURE: 60 MMHG

## 2025-06-03 DIAGNOSIS — K63.89 EPIPLOIC APPENDAGITIS: Primary | ICD-10-CM

## 2025-06-03 LAB
ALBUMIN SERPL BCG-MCNC: 4.1 G/DL (ref 3.5–5)
ALP SERPL-CCNC: 50 U/L (ref 34–104)
ALT SERPL W P-5'-P-CCNC: 12 U/L (ref 7–52)
ANION GAP SERPL CALCULATED.3IONS-SCNC: 7 MMOL/L (ref 4–13)
AST SERPL W P-5'-P-CCNC: 19 U/L (ref 13–39)
BACTERIA UR QL AUTO: NORMAL /HPF
BASOPHILS # BLD AUTO: 0.06 THOUSANDS/ÂΜL (ref 0–0.1)
BASOPHILS NFR BLD AUTO: 1 % (ref 0–1)
BILIRUB SERPL-MCNC: 1.16 MG/DL (ref 0.2–1)
BILIRUB UR QL STRIP: NEGATIVE
BUN SERPL-MCNC: 17 MG/DL (ref 5–25)
CALCIUM SERPL-MCNC: 9.2 MG/DL (ref 8.4–10.2)
CHLORIDE SERPL-SCNC: 105 MMOL/L (ref 96–108)
CLARITY UR: CLEAR
CO2 SERPL-SCNC: 24 MMOL/L (ref 21–32)
COLOR UR: ABNORMAL
CREAT SERPL-MCNC: 0.86 MG/DL (ref 0.6–1.3)
EOSINOPHIL # BLD AUTO: 0.31 THOUSAND/ÂΜL (ref 0–0.61)
EOSINOPHIL NFR BLD AUTO: 3 % (ref 0–6)
ERYTHROCYTE [DISTWIDTH] IN BLOOD BY AUTOMATED COUNT: 13.2 % (ref 11.6–15.1)
GFR SERPL CREATININE-BSD FRML MDRD: 71 ML/MIN/1.73SQ M
GLUCOSE SERPL-MCNC: 101 MG/DL (ref 65–140)
GLUCOSE UR STRIP-MCNC: NEGATIVE MG/DL
HCT VFR BLD AUTO: 45 % (ref 34.8–46.1)
HGB BLD-MCNC: 14.8 G/DL (ref 11.5–15.4)
HGB UR QL STRIP.AUTO: NEGATIVE
IMM GRANULOCYTES # BLD AUTO: 0.03 THOUSAND/UL (ref 0–0.2)
IMM GRANULOCYTES NFR BLD AUTO: 0 % (ref 0–2)
KETONES UR STRIP-MCNC: NEGATIVE MG/DL
LEUKOCYTE ESTERASE UR QL STRIP: ABNORMAL
LIPASE SERPL-CCNC: 24 U/L (ref 11–82)
LYMPHOCYTES # BLD AUTO: 1.94 THOUSANDS/ÂΜL (ref 0.6–4.47)
LYMPHOCYTES NFR BLD AUTO: 21 % (ref 14–44)
MAGNESIUM SERPL-MCNC: 2.2 MG/DL (ref 1.9–2.7)
MCH RBC QN AUTO: 30.6 PG (ref 26.8–34.3)
MCHC RBC AUTO-ENTMCNC: 32.9 G/DL (ref 31.4–37.4)
MCV RBC AUTO: 93 FL (ref 82–98)
MONOCYTES # BLD AUTO: 0.67 THOUSAND/ÂΜL (ref 0.17–1.22)
MONOCYTES NFR BLD AUTO: 7 % (ref 4–12)
NEUTROPHILS # BLD AUTO: 6.15 THOUSANDS/ÂΜL (ref 1.85–7.62)
NEUTS SEG NFR BLD AUTO: 68 % (ref 43–75)
NITRITE UR QL STRIP: NEGATIVE
NON-SQ EPI CELLS URNS QL MICRO: NORMAL /HPF
NRBC BLD AUTO-RTO: 0 /100 WBCS
PH UR STRIP.AUTO: 6 [PH]
PLATELET # BLD AUTO: 219 THOUSANDS/UL (ref 149–390)
PMV BLD AUTO: 10.8 FL (ref 8.9–12.7)
POTASSIUM SERPL-SCNC: 5 MMOL/L (ref 3.5–5.3)
PROT SERPL-MCNC: 7.2 G/DL (ref 6.4–8.4)
PROT UR STRIP-MCNC: NEGATIVE MG/DL
RBC # BLD AUTO: 4.83 MILLION/UL (ref 3.81–5.12)
RBC #/AREA URNS AUTO: NORMAL /HPF
SODIUM SERPL-SCNC: 136 MMOL/L (ref 135–147)
SP GR UR STRIP.AUTO: 1.02 (ref 1–1.03)
UROBILINOGEN UR STRIP-ACNC: <2 MG/DL
WBC # BLD AUTO: 9.16 THOUSAND/UL (ref 4.31–10.16)
WBC #/AREA URNS AUTO: NORMAL /HPF

## 2025-06-03 PROCEDURE — 99284 EMERGENCY DEPT VISIT MOD MDM: CPT

## 2025-06-03 PROCEDURE — 96374 THER/PROPH/DIAG INJ IV PUSH: CPT

## 2025-06-03 PROCEDURE — 99285 EMERGENCY DEPT VISIT HI MDM: CPT | Performed by: EMERGENCY MEDICINE

## 2025-06-03 PROCEDURE — 74176 CT ABD & PELVIS W/O CONTRAST: CPT

## 2025-06-03 PROCEDURE — 83735 ASSAY OF MAGNESIUM: CPT | Performed by: EMERGENCY MEDICINE

## 2025-06-03 PROCEDURE — 81001 URINALYSIS AUTO W/SCOPE: CPT | Performed by: EMERGENCY MEDICINE

## 2025-06-03 PROCEDURE — 85025 COMPLETE CBC W/AUTO DIFF WBC: CPT | Performed by: EMERGENCY MEDICINE

## 2025-06-03 PROCEDURE — 83690 ASSAY OF LIPASE: CPT | Performed by: EMERGENCY MEDICINE

## 2025-06-03 PROCEDURE — 80053 COMPREHEN METABOLIC PANEL: CPT | Performed by: EMERGENCY MEDICINE

## 2025-06-03 PROCEDURE — 36415 COLL VENOUS BLD VENIPUNCTURE: CPT | Performed by: EMERGENCY MEDICINE

## 2025-06-03 RX ORDER — ACETAMINOPHEN 325 MG/1
975 TABLET ORAL ONCE
Status: COMPLETED | OUTPATIENT
Start: 2025-06-03 | End: 2025-06-03

## 2025-06-03 RX ORDER — MORPHINE SULFATE 4 MG/ML
4 INJECTION, SOLUTION INTRAMUSCULAR; INTRAVENOUS ONCE
Status: COMPLETED | OUTPATIENT
Start: 2025-06-03 | End: 2025-06-03

## 2025-06-03 RX ORDER — MORPHINE SULFATE 15 MG/1
15 TABLET ORAL EVERY 6 HOURS PRN
Qty: 15 TABLET | Refills: 0 | Status: SHIPPED | OUTPATIENT
Start: 2025-06-03

## 2025-06-03 RX ADMIN — MORPHINE SULFATE 4 MG: 4 INJECTION INTRAVENOUS at 11:10

## 2025-06-03 RX ADMIN — ACETAMINOPHEN 975 MG: 325 TABLET ORAL at 10:46

## 2025-06-03 NOTE — TELEPHONE ENCOUNTER
" REASON FOR CONVERSATION: Abdominal Pain    SYMPTOMS: LLQ pain starting several days ago.  Started intermittent.  Did a great deal of walking and moving yesterday without noticing pain but started again last night.  Lasted all night long.  Pain 7-8/10.  Denies nausea, vomiting, diarrhea.  No bowel movement since Sunday which can be normal for her.  Did take 2 senna last night but has not gone yet.  +gas.     OTHER HEALTH INFORMATION: Hx of diverticulosis per patient from last colonoscopy    PROTOCOL DISPOSITION: Discuss with Provider and Call Back Within 24-48 hrs    CARE ADVICE PROVIDED: Clears.  First available OV.  Advised hospital if pain increases.     PRACTICE FOLLOW-UP: recommendations?  CT?         Reason for Disposition   The patient has increased constipation without bleeding w LLQ pain.    Answer Assessment - Initial Assessment Questions  1. When did the pain start?   2 days ago  2. Is the pain constant or intermittent and does the pain radiate? If so, where does it radiate?   Was intermittent but started steady last night  3. Is your LLQ (lower left quadrant) pain tender to touch or worsen after pressing on the abdomen?   yes  4. PAIN SEVERITY: If present, ask: \"How bad is the pain?\"  (e.g., Scale 1-10; mild, moderate, or severe)      - MILD (1-3): does not interfere with normal activities, abdomen soft and not tender to touch    - MODERATE (4-7): interferes with normal activities or awakens from sleep, tender to touch   - SEVERE (8-10): excruciating pain, doubled over, unable to do any normal activities   7-8/10   5. Does anything make the pain worse? Does anything make the pain better?   Laying on right side better than laying on back is worse  6. Do you have a history of diverticulitis? If yes, what was your prior treatment for diverticulitis?   diverticulosis  7. Are you passing gas?   yes  8. Have you noticed a change in your bowels?   Normal.  Has some constipation  9. Have you had any black or " bloody stools?   denies  10. Do you have a fever?   denies  11. Are you able to eat and drink without difficulty?   Yesterday ate and drank with no issues.  Has not tried today.    Protocols used: GI-Left Lower Quadrant Pain (Diverticulitis)-ADULT-[place]

## 2025-06-03 NOTE — ED NOTES
Pt verbalized understanding of discharge instructions as given by treating provider; vital signs stable at discharge; ambulated out of the ED without assistance; uneventful ED visit     Yasmeen Vogt RN  06/03/25 [id number]

## 2025-06-03 NOTE — ED PROVIDER NOTES
ED Disposition       None          Assessment & Plan       Medical Decision Making  Patient is a 64-year-old female past medical history of diabetes, bipolar disorder, hypothyroid, CKD stage II status post nephrectomy, depression, asthma presenting with abdominal pain.  Patient is well-appearing at bedside with stable vitals and in no acute distress.  She has left lower quadrant right upper quadrant tenderness without guarding or CVA tenderness.  Will obtain CT abdomen pelvis to assess for diverticulitis, small bowel obstruction, kidney stone, other intra-abdominal pathology, urinalysis to assess for signs of infection, labs to assess for electrolyte abnormalities, anemia, LIVAN, give pain control and reassess.    Amount and/or Complexity of Data Reviewed  Labs: ordered.  Radiology: ordered.    Risk  OTC drugs.  Prescription drug management.        ED Course as of 06/03/25 1427   Tue Jun 03, 2025   1327 Patient with left lower quadrant epiploic appendagitis, will give pain control and discharged with outpatient follow-up and return precautions.       Medications   morphine injection 4 mg (has no administration in time range)   acetaminophen (TYLENOL) tablet 975 mg (has no administration in time range)       ED Risk Strat Scores                    No data recorded                            History of Present Illness       Chief Complaint   Patient presents with   • Abdominal Pain     LLQ abd pain that began a few days ago, worsening overnight. Hx of Diverticulosis. Denies N/V.       Past Medical History[1]   Past Surgical History[2]   Family History[3]   Social History[4]   E-Cigarette/Vaping   • E-Cigarette Use Never User       E-Cigarette/Vaping Substances   • Nicotine No    • THC No    • CBD No    • Flavoring No    • Other No    • Unknown No       I have reviewed and agree with the history as documented.     Patient is a 64-year-old female past medical history diabetes, bipolar disorder, hypothyroid, status post  nephrectomy, CKD stage II, depression, asthma presenting with abdominal pain.  Patient notes left lower quadrant abdominal pain for the last several days but worsened overnight.  States she is never had it before and has not take any medication for it.  States it is worse with lying flat and better with walking.  States that the pain is nonradiating.  Denies any nausea or vomiting, urinary symptoms, diarrhea constipation, fevers, chest pain, shortness of breath, dizziness, falls or injuries, rashes, vision changes.        Review of Systems   All other systems reviewed and are negative.          Objective       ED Triage Vitals [06/03/25 1016]   Temperature Pulse Blood Pressure Respirations SpO2 Patient Position - Orthostatic VS   97.5 °F (36.4 °C) 64 108/68 20 98 % Sitting      Temp Source Heart Rate Source BP Location FiO2 (%) Pain Score    Temporal Monitor Left arm -- --      Vitals      Date and Time Temp Pulse SpO2 Resp BP Pain Score FACES Pain Rating User   06/03/25 1016 97.5 °F (36.4 °C) 64 98 % 20 108/68 -- -- GP            Physical Exam  Vitals reviewed.   Constitutional:       General: She is not in acute distress.     Appearance: Normal appearance. She is not ill-appearing.   HENT:      Mouth/Throat:      Mouth: Mucous membranes are moist.     Eyes:      Conjunctiva/sclera: Conjunctivae normal.      Comments: Normal conjunctiva     Cardiovascular:      Rate and Rhythm: Normal rate and regular rhythm.      Heart sounds: Normal heart sounds.      Comments: Equal radial pulses  Pulmonary:      Effort: Pulmonary effort is normal.      Breath sounds: Normal breath sounds.   Abdominal:      General: Abdomen is flat.      Palpations: Abdomen is soft.      Tenderness: There is abdominal tenderness in the right upper quadrant and left lower quadrant. There is no right CVA tenderness, left CVA tenderness or guarding.     Musculoskeletal:         General: No swelling. Normal range of motion.      Cervical back: Neck  supple.     Skin:     General: Skin is warm and dry.     Neurological:      General: No focal deficit present.      Mental Status: She is alert.     Psychiatric:         Mood and Affect: Mood normal.         Results Reviewed       None            No orders to display       Procedures    ED Medication and Procedure Management   Prior to Admission Medications   Prescriptions Last Dose Informant Patient Reported? Taking?   BD Pen Needle Alison 2nd Gen 32G X 4 MM MISC  Self Yes No   ELDERBERRY PO  Self Yes No   Sig: Take by mouth   Patient not taking: Reported on 3/7/2025   Ingrezza 40 MG CAPS  Self Yes No   Ozempic, 1 MG/DOSE, 4 MG/3ML injection pen  Self Yes No   SUMAtriptan (IMITREX) 50 mg tablet  Self Yes No   Sig: Take 50 mg by mouth daily as needed   Vraylar 1.5 MG capsule  Self Yes No   Sig: Take 2.5 mg by mouth daily   albuterol (Ventolin HFA) 90 mcg/act inhaler  Self No No   Sig: Inhale 2 puffs every 6 (six) hours as needed for wheezing   amantadine (SYMMETREL) 100 mg capsule  Self Yes No   Sig: Take 100 mg by mouth 2 (two) times a day   clonazePAM (KlonoPIN) 1 mg tablet  Self Yes No   Sig: Take 1 mg by mouth 2 (two) times a day   ergocalciferol (VITAMIN D2) 50,000 units  Self Yes No   gabapentin (NEURONTIN) 300 mg capsule  Self Yes No   Sig: Take 300 mg by mouth 3 (three) times a day   levothyroxine 100 mcg tablet  Self No No   Sig: Take 1 tablet (100 mcg total) by mouth daily   liothyronine (CYTOMEL) 25 mcg tablet  Self Yes No   Sig: Take 25 mcg by mouth every other day   lisinopril (ZESTRIL) 2.5 mg tablet   No No   Sig: TAKE 1 TABLET BY MOUTH EVERY DAY   lithium carbonate (LITHOBID) 300 mg CR tablet  Self Yes No   Si mg in the morning 2 tabs daily   pantoprazole (PROTONIX) 20 mg tablet   No No   Sig: Take 1 tablet (20 mg total) by mouth daily   propranolol (INDERAL) 10 mg tablet   No No   Sig: Take 1 tablet (10 mg total) by mouth 3 (three) times a day as needed (For akathisia)   propranolol (INDERAL) 20  mg tablet  Self Yes No   Sig: Take 20 mg by mouth every 12 (twelve) hours   senna (SENOKOT) 8.6 mg  Self No No   Sig: Take 2 tablets (17.2 mg total) by mouth daily at bedtime   traZODone (DESYREL) 50 mg tablet  Self Yes No   Sig: Take 50 mg by mouth daily at bedtime      Facility-Administered Medications: None     Patient's Medications   Discharge Prescriptions    No medications on file     No discharge procedures on file.  ED SEPSIS DOCUMENTATION                [1]  Past Medical History:  Diagnosis Date   • Arthritis     Bilateral knee   • Asthma All my life; pretty mild these days   • Bipolar 2 disorder (HCC) 1990   • Chronic bronchitis (HCC)    • Chronic kidney disease    • Depression    • Diabetes mellitus (HCC)    • Disease of thyroid gland 2009   • GERD (gastroesophageal reflux disease)    • Headache(784.0) 1995   • Pneumonia 2020   • Shingles 2009   • Sleep apnea, obstructive    • Vertigo     05//2024   [2]  Past Surgical History:  Procedure Laterality Date   • ABDOMINAL SURGERY  Oct 2007   • COLONOSCOPY     • DILATION AND CURETTAGE OF UTERUS     • EGD     • ELECTROCONVULSIVE THERAPY     • HYSTERECTOMY  2007   • KIDNEY SURGERY Right 2009   • KNEE SURGERY  Right meniscus repair   • NEPHRECTOMY  8/2/2009   • SINUS SURGERY  1987   [3]  Family History  Problem Relation Name Age of Onset   • Heart disease Mother Aura    • Arthritis Mother Aura    • COPD Mother Aura    • Stroke Mother Aura    • Thyroid disease Mother Aura    • Glaucoma Mother Aura    • Colon cancer Father Justice    • Neuropathy Father Justice    • Cancer Father Justice         Colon   • Colon cancer Maternal Grandmother Yeimi    • Cancer Maternal Grandmother Yeimi         Colon   • Diabetes Maternal Grandfather Armen    • Mental illness Paternal Grandfather Geoffrey    • Heart attack Brother     • Breast cancer Cousin maternal    • Asthma Sister Gabriella    • Depression Sister Gabriella    • Mental illness Sister Gabriella    • Anxiety disorder Sister  Gabriella    • Heart disease Brother Jonathon          of sudden heart attack   • Diabetes Maternal Aunt Amalia    [4]  Social History  Tobacco Use   • Smoking status: Never     Passive exposure: Never   • Smokeless tobacco: Never   Vaping Use   • Vaping status: Never Used   Substance Use Topics   • Alcohol use: Not Currently     Alcohol/week: 1.0 standard drink of alcohol     Types: 1 Glasses of wine per week     Comment: RARELY   • Drug use: Never      Radha Bee,   25 1428

## 2025-06-03 NOTE — TELEPHONE ENCOUNTER
Called and spoke with patient.Informed her of recommendation to go to the ER.Patient states she will go to the [facility] ER.

## 2025-06-04 ENCOUNTER — HOSPITAL ENCOUNTER (OUTPATIENT)
Dept: ULTRASOUND IMAGING | Facility: CLINIC | Age: 65
Discharge: HOME | End: 2025-06-04
Payer: MEDICARE

## 2025-06-04 ENCOUNTER — HOSPITAL ENCOUNTER (OUTPATIENT)
Dept: MAMMOGRAPHY | Facility: CLINIC | Age: 65
Discharge: HOME | End: 2025-06-04
Payer: MEDICARE

## 2025-06-04 VITALS — BODY MASS INDEX: 35.03 KG/M2 | WEIGHT: 218 LBS | HEIGHT: 66 IN

## 2025-06-04 DIAGNOSIS — R92.8 ABNORMAL MAMMOGRAM: ICD-10-CM

## 2025-06-04 DIAGNOSIS — N60.02 BREAST CYST, LEFT: ICD-10-CM

## 2025-06-04 PROCEDURE — 76642 ULTRASOUND BREAST LIMITED: CPT

## 2025-06-04 PROCEDURE — 77066 DX MAMMO INCL CAD BI: CPT

## 2025-06-04 PROCEDURE — G0279 TOMOSYNTHESIS, MAMMO: HCPCS

## 2025-06-10 ENCOUNTER — TELEPHONE (OUTPATIENT)
Dept: OBGYN CLINIC | Facility: CLINIC | Age: 65
End: 2025-06-10

## 2025-06-10 ENCOUNTER — OFFICE VISIT (OUTPATIENT)
Age: 65
End: 2025-06-10
Payer: MEDICARE

## 2025-06-10 VITALS
DIASTOLIC BLOOD PRESSURE: 62 MMHG | BODY MASS INDEX: 34.78 KG/M2 | OXYGEN SATURATION: 97 % | HEART RATE: 60 BPM | HEIGHT: 66 IN | SYSTOLIC BLOOD PRESSURE: 116 MMHG | WEIGHT: 216.4 LBS

## 2025-06-10 DIAGNOSIS — K63.89 EPIPLOIC APPENDAGITIS: Primary | ICD-10-CM

## 2025-06-10 PROCEDURE — 99213 OFFICE O/P EST LOW 20 MIN: CPT | Performed by: PHYSICIAN ASSISTANT

## 2025-06-10 RX ORDER — MIRTAZAPINE 7.5 MG/1
TABLET, FILM COATED ORAL
COMMUNITY
Start: 2025-05-14 | End: 2025-06-16

## 2025-06-10 NOTE — TELEPHONE ENCOUNTER
Received a call from this patient. She had the Cortisone injection in both knees and the pain relief only lasted approximately one week, and pain level back to 8. She would like to try the one shot VISCO gel injection again, it has been over a year since last gel injections.  If possible she would like to get before June 30 th since she will be out of town for 3 weeks follow that.

## 2025-06-10 NOTE — PROGRESS NOTES
Name: Katlyn Frye      : 1960      MRN: [id number]  Encounter Provider: Ani Barnes PA-C  Encounter Date: 6/10/2025   Encounter department: [facility] [place]  :  Assessment & Plan  Epiploic appendagitis    Patient developed the acute onset of LLQ abdominal pain.   She went to the ER. CT Scan A/P 6/3 showed left lower epiploic appendagitis.  She is significantly improved; her discomfort is very minimal at this point.   She had a colonoscopy in  which showed diverticulosis.    We reviewed the self-limited nature of epiploic appendagitis and conservative management. NSAIDs if needed.  Expect her discomfort will continue to fully subside over the next week or so.      History of Present Illness   HPI  Katlyn Frye is a 64 y.o. female who presents to the office for follow up from her ER visit.  Reports the acute onset of left lower quadrant abdominal pain a little over a week ago.  He went to the ER and had a CAT scan of the abdomen and pelvis on 6/3 which showed left lower epiploic appendagitis.  He reports her abdominal pain has significantly improved now.  She only has very minimal discomfort at this point.  She has any issues with her bowel movements or rectal bleeding.  She previously had a colonoscopy in  which showed diverticulosis.  History obtained from: patient      Medical History Reviewed by provider this encounter:     .  Past Medical History   Past Medical History[1]  Past Surgical History[2]  Family History[3]   reports that she has never smoked. She has never been exposed to tobacco smoke. She has never used smokeless tobacco. She reports that she does not currently use alcohol after a past usage of about 1.0 standard drink of alcohol per week. She reports that she does not use drugs.  Current Outpatient Medications   Medication Instructions    albuterol (Ventolin HFA) 90 mcg/act inhaler 2 puffs, Inhalation, Every 6 hours PRN    amantadine  "(SYMMETREL) 100 mg, 2 times daily    BD Pen Needle Alison 2nd Gen 32G X 4 MM MISC     clonazePAM (KLONOPIN) 1 mg, 2 times daily    ergocalciferol (VITAMIN D2) 50,000 units No dose, route, or frequency recorded.    gabapentin (NEURONTIN) 300 mg, 3 times daily    Ingrezza 40 MG CAPS     levothyroxine 100 mcg, Oral, Daily    liothyronine (CYTOMEL) 25 mcg, Every other day    lisinopril (ZESTRIL) 2.5 mg, Oral, Daily    lithium carbonate (LITHOBID) 300 mg, Daily    mirtazapine (REMERON) 7.5 MG tablet take 1 tablet (7.5 mg) by oral route once daily at bedtime    morphine (MSIR) 15 mg, Oral, Every 6 hours PRN    Ozempic, 1 MG/DOSE, 4 MG/3ML injection pen     pantoprazole (PROTONIX) 20 mg, Oral, Daily    propranolol (INDERAL) 20 mg, Every 12 hours scheduled    propranolol (INDERAL) 10 mg, Oral, 3 times daily PRN    senna (SENOKOT) 17.2 mg, Oral, Daily at bedtime    SUMAtriptan (IMITREX) 50 mg, Daily PRN    traZODone (DESYREL) 50 mg, Daily at bedtime    Vraylar 2.5 mg, Daily   Allergies[4]   Medications Ordered Prior to Encounter[5]   Social History[6]     Objective   /62   Pulse 60   Ht 5' 6\" (1.676 m)   Wt 98.2 kg (216 lb 6.4 oz)   SpO2 97%   BMI 34.93 kg/m²      Physical Exam  Constitutional:       General: She is not in acute distress.     Appearance: Normal appearance. She is not ill-appearing.   HENT:      Head: Normocephalic and atraumatic.     Cardiovascular:      Rate and Rhythm: Normal rate and regular rhythm.   Pulmonary:      Effort: No respiratory distress.      Breath sounds: Normal breath sounds.   Abdominal:      General: There is no distension.      Palpations: Abdomen is soft.      Comments: Minimal LLQ discomfort.     Skin:     General: Skin is warm and dry.     Neurological:      Mental Status: She is alert and oriented to person, place, and time.     Psychiatric:         Mood and Affect: Mood normal.         Behavior: Behavior normal.                [1]   Past Medical History:  Diagnosis Date    " Arthritis     Bilateral knee    Asthma All my life; pretty mild these days    Bipolar 2 disorder (HCC)     Chronic bronchitis (HCC)     Chronic kidney disease     Depression     Diabetes mellitus (HCC)     Disease of thyroid gland     GERD (gastroesophageal reflux disease)     Headache(784.0)     Pneumonia 2020    Shingles 2009    Sleep apnea, obstructive     Vertigo     2024   [2]   Past Surgical History:  Procedure Laterality Date    ABDOMINAL SURGERY  Oct 2007    COLONOSCOPY      DILATION AND CURETTAGE OF UTERUS      EGD      ELECTROCONVULSIVE THERAPY      HYSTERECTOMY  2007    KIDNEY SURGERY Right 2009    KNEE SURGERY  Right meniscus repair    NEPHRECTOMY  2009    SINUS SURGERY  1987   [3]   Family History  Problem Relation Name Age of Onset    Heart disease Mother Aura     Arthritis Mother Aura     COPD Mother Aura     Stroke Mother Aura     Thyroid disease Mother Aura     Glaucoma Mother Aura     Colon cancer Father Justice     Neuropathy Father Justice     Cancer Father Justice         Colon    Asthma Sister Gabriella     Depression Sister Gabriella     Mental illness Sister Gabriella     Anxiety disorder Sister Gabriella     Colon cancer Maternal Grandmother Yeimi     Cancer Maternal Grandmother Yeimi         Colon    Diabetes Maternal Grandfather Armen     Mental illness Paternal Grandfather Geoffrey     Heart attack Brother      Heart disease Brother Jonathon          of sudden heart attack    Diabetes Maternal Aunt Amalia     Breast cancer Cousin maternal    [4]   Allergies  Allergen Reactions    Iv Contrast [Iodinated Contrast Media] Vomiting     Vomiting blood    Lamotrigine Hives and Rash     rash    Levofloxacin Hives and Rash     rash    Lurasidone Hives and Rash    Dulaglutide Diarrhea    Iodine Strong Vomiting     Vomiting Blood     Benztropine Diarrhea and Headache     Insomnia, muscle weakness acathisia worsening    Brexpiprazole Other (See Comments)     Feels like something is crawling  under her skin when she sits she starts uncontrollable movement to rock back and off     Mirtazapine Tremor    Escitalopram Other (See Comments)    Food Rash     Eggplant    Lumateperone Anxiety, Irritability and Other (See Comments)   [5]   Current Outpatient Medications on File Prior to Visit   Medication Sig Dispense Refill    albuterol (Ventolin HFA) 90 mcg/act inhaler Inhale 2 puffs every 6 (six) hours as needed for wheezing 18 g 5    amantadine (SYMMETREL) 100 mg capsule Take 100 mg by mouth in the morning and 100 mg in the evening.      BD Pen Needle Alison 2nd Gen 32G X 4 MM MISC       clonazePAM (KlonoPIN) 1 mg tablet Take 1 mg by mouth in the morning and 1 mg in the evening.      ergocalciferol (VITAMIN D2) 50,000 units       gabapentin (NEURONTIN) 300 mg capsule Take 300 mg by mouth in the morning and 300 mg in the evening and 300 mg before bedtime.      Ingrezza 40 MG CAPS       liothyronine (CYTOMEL) 25 mcg tablet Take 25 mcg by mouth every other day      lisinopril (ZESTRIL) 2.5 mg tablet TAKE 1 TABLET BY MOUTH EVERY DAY 90 tablet 1    lithium carbonate (LITHOBID) 300 mg CR tablet 300 mg in the morning 2 tabs daily      Ozempic, 1 MG/DOSE, 4 MG/3ML injection pen       pantoprazole (PROTONIX) 20 mg tablet Take 1 tablet (20 mg total) by mouth daily 90 tablet 3    propranolol (INDERAL) 20 mg tablet Take 20 mg by mouth every 12 (twelve) hours      senna (SENOKOT) 8.6 mg Take 2 tablets (17.2 mg total) by mouth daily at bedtime 60 tablet 11    SUMAtriptan (IMITREX) 50 mg tablet Take 50 mg by mouth daily as needed      traZODone (DESYREL) 50 mg tablet Take 50 mg by mouth daily at bedtime      Vraylar 1.5 MG capsule Take 2.5 mg by mouth in the morning.      levothyroxine 100 mcg tablet Take 1 tablet (100 mcg total) by mouth daily 90 tablet 1    mirtazapine (REMERON) 7.5 MG tablet take 1 tablet (7.5 mg) by oral route once daily at bedtime (Patient not taking: Reported on 6/10/2025)      morphine (MSIR) 15 mg  tablet Take 1 tablet (15 mg total) by mouth every 6 (six) hours as needed for severe pain for up to 15 doses Max Daily Amount: 60 mg (Patient not taking: Reported on 6/10/2025) 15 tablet 0    propranolol (INDERAL) 10 mg tablet Take 1 tablet (10 mg total) by mouth 3 (three) times a day as needed (For akathisia) 30 tablet 5    [DISCONTINUED] ELDERBERRY PO Take by mouth (Patient not taking: Reported on 3/7/2025)       No current facility-administered medications on file prior to visit.   [6]   Social History  Tobacco Use    Smoking status: Never     Passive exposure: Never    Smokeless tobacco: Never   Vaping Use    Vaping status: Never Used   Substance and Sexual Activity    Alcohol use: Not Currently     Alcohol/week: 1.0 standard drink of alcohol     Types: 1 Glasses of wine per week     Comment: RARELY    Drug use: Never    Sexual activity: Not Currently     Partners: Female, Male     Birth control/protection: Post-menopausal

## 2025-06-11 DIAGNOSIS — M17.0 PRIMARY OSTEOARTHRITIS OF BOTH KNEES: Primary | ICD-10-CM

## 2025-06-16 ENCOUNTER — OFFICE VISIT (OUTPATIENT)
Dept: NEUROLOGY | Facility: CLINIC | Age: 65
End: 2025-06-16
Payer: MEDICARE

## 2025-06-16 VITALS
OXYGEN SATURATION: 98 % | SYSTOLIC BLOOD PRESSURE: 114 MMHG | HEIGHT: 66 IN | DIASTOLIC BLOOD PRESSURE: 78 MMHG | HEART RATE: 60 BPM | WEIGHT: 217 LBS | BODY MASS INDEX: 34.87 KG/M2

## 2025-06-16 DIAGNOSIS — G24.01 TARDIVE DYSKINESIA: Primary | ICD-10-CM

## 2025-06-16 DIAGNOSIS — G25.71 AKATHISIA: ICD-10-CM

## 2025-06-16 DIAGNOSIS — H81.13 BENIGN PAROXYSMAL POSITIONAL VERTIGO DUE TO BILATERAL VESTIBULAR DISORDER: ICD-10-CM

## 2025-06-16 PROCEDURE — 99215 OFFICE O/P EST HI 40 MIN: CPT | Performed by: STUDENT IN AN ORGANIZED HEALTH CARE EDUCATION/TRAINING PROGRAM

## 2025-06-16 PROCEDURE — G2211 COMPLEX E/M VISIT ADD ON: HCPCS | Performed by: STUDENT IN AN ORGANIZED HEALTH CARE EDUCATION/TRAINING PROGRAM

## 2025-06-16 RX ORDER — CLONAZEPAM 0.25 MG/1
0.25 TABLET, ORALLY DISINTEGRATING ORAL DAILY
Qty: 30 TABLET | Refills: 5 | Status: SHIPPED | OUTPATIENT
Start: 2025-06-16 | End: 2025-12-13

## 2025-06-16 NOTE — Clinical Note
Patient requested I send her psychiatrist notification that I am increasing her Klonopin to 0.25mg in the morning and 1mg at night. His name is Gopal Burdick and he practices in [facility]. Could someone call his office and let them know, please?   Thank you!

## 2025-06-16 NOTE — PROGRESS NOTES
"Neurology Ambulatory Visit  Name: Katlyn Frye       : 1960       MRN: [id number]   Encounter Provider: Ayse Huerta MD   Encounter Date: 2025  Encounter department: [facility]    Katlyn Frye is a 64 y.o. female with bipolar d/o, DMII, hypothyroidism, CKD II, NYLA, and bilateral BPPV who presents in follow-up for tardive dyskinesia, tremor and akathisia related to antipsychotic use. She has been off of antipsychotics since . Her tardive dyskinesia and tremors are under good control with Ingrezza and amantadine however she her akathisia has worsened. She is currently on Klonopin 1mg at night and propranolol 20mg BID. Due to her heart rate we do not have room to increase the propranolol, so I would instead add 0.5mg of Klonopin in the morning.     Assessment & Plan  Tardive dyskinesia  - continue Ingrezza 40mg   - continue amantadine 100mg BID       Akathisia  - continue propranolol 20mg BID   - increase Klonopin to 0.25mg in the morning and 1mg at night.   Orders:    clonazePAM (KlonoPIN) 0.25 MG disintegrating tablet; Take 1 tablet (0.25 mg total) by mouth in the morning     RTC 3 mo         INTERVAL HISTORY    On May 18th she had an episode of full body spasms. She was started on mirtazapine for akathisias by her PCP. She describes \"rolling waves of spasms\" from her chest to her toes and then back up and again. It was very painful and uncomfortable. It lasted about 45 minutes. She subsequently stopped taking the mirtazapine and she did not have any further episodes.     She continues to have a bothersome feeling of restlessness primarily in her legs, for instance when she is at [employer] she feels the need to move her legs. Her symptoms start in the morning. Regarding her Klonopin she takes 1mg at night, and 1mg during the day occasionally, which does help her symptoms but it makes her sleepy. When she has tried the PRN dosage of propranolol, it has not " "really helped.     Her psychiatrist is Dr. Gopal Burdick in [facility]. [phone] (cell phone).         Prior Work-up:   VNG 6/5/24:   Abnormal tracking and optokinetic  Kyle-Hallpike positive bilaterally but worse on the left side. Normal caloric irrigation.   MRI brain wo IAC 7/17/24: Normal   CTA H/N wwo 3/24/23: Normal.     Initial History:  She was seen by Dr. Amin in September 2023 due to concerns for tardive dyskinesia related to multiple antipsychotics. She was described as having restlessness with akathisia and intermittent tremors of left upper extremity and right lower extremity. She was taking Abilify/Seroquel/brexipiprazole and Adderall at the time. She was started on Ingrezza with amantadine and clonazepam with improvement in her symptoms and resolution of tardive dyskinesia. She was previously on benztropine which caused diarrhea. On higher dosage of Ingrezza she felt fatigue weakness in her legs.     She was diagnosed with bilateral BPPV in April 2024 which improved with vestibular therapy. MRI was negative for a central cause.     Past Medical History:    Past Medical History[1]  Past Surgical History[2]    Family History:  Family History[3]    Social History:  Social History[4]     Allergies:  Allergies[5]    Medications:  Current Medications[6]      OBJECTIVE  /78 (BP Location: Right arm, Patient Position: Sitting, Cuff Size: Standard)   Pulse 98   Ht 5' 6\" (1.676 m)   Wt 98.4 kg (217 lb)   SpO2 98%   BMI 35.02 kg/m²      Labs  I have reviewed pertinent labs:  CBC:   Lab Results   Component Value Date    WBC 9.16 06/03/2025    RBC 4.83 06/03/2025    HGB 14.8 06/03/2025    HCT 45.0 06/03/2025    MCV 93 06/03/2025     06/03/2025    MCH 30.6 06/03/2025    MCHC 32.9 06/03/2025    RDW 13.2 06/03/2025    MPV 10.8 06/03/2025    NEUTROABS 6.15 06/03/2025     CMP:   Lab Results   Component Value Date    SODIUM 136 06/03/2025    K 5.0 06/03/2025     06/03/2025    CO2 24 " 06/03/2025    AGAP 7 06/03/2025    BUN 17 06/03/2025    CREATININE 0.86 06/03/2025    GLUC 101 06/03/2025    GLUF 112 (H) 12/27/2024    CALCIUM 9.2 06/03/2025    AST 19 06/03/2025    ALT 12 06/03/2025    ALKPHOS 50 06/03/2025    TP 7.2 06/03/2025    ALB 4.1 06/03/2025    TBILI 1.16 (H) 06/03/2025    EGFR 71 06/03/2025       Lab Results   Component Value Date/Time    ZQHPUOPM02 1,304 (H) 06/24/2024 01:52 PM    TSH 2.39 03/17/2025 12:09 PM    TSH 0.46 08/31/2023 01:37 PM    SAZ5XRTZXQXI 0.302 (L) 12/27/2024 12:55 PM    FREET4 0.84 12/27/2024 12:55 PM    HGBA1C 5.8 (H) 12/27/2024 12:55 PM    HGBA1C 5.7 (H) 08/28/2023 07:49 AM    FOLATE 5.6 (L) 06/24/2024 01:52 PM    COPPER 104 06/24/2024 01:52 PM    CRP 1.1 02/04/2024 05:38 PM              General Exam  GENERAL APPEARANCE:  No distress, alert, interactive and cooperative.  CARDIOVASCULAR: Warm and well perfused  LUNGS: normal work of breathing on room air  EXTREMITIES: no peripheral edema     Neurologic Exam  Mental Status: Alert and oriented to person, place, and time.   Language: Fluent, comprehension intact.  Cranial Nerves: EOMI with no nystagmus. Face is symmetric. No dysarthria. Hearing is intact to conversation.    Motor:  Antigravity in all extremities.   Gait: Slow gait.     Administrative Statements   The total amount of time spent with the patient and on chart review and documentation was 40 minutes. Issues addressed during this visit included counseling on diagnosis and prognosis, counseling on medical management, and counseling on medication side effects.          [1]   Past Medical History:  Diagnosis Date    Arthritis     Bilateral knee    Asthma All my life; pretty mild these days    Bipolar 2 disorder (HCC) 1990    Chronic bronchitis (HCC)     Chronic kidney disease     Depression     Diabetes mellitus (HCC)     Disease of thyroid gland 2009    GERD (gastroesophageal reflux disease)     Headache(784.0) 1995    Pneumonia 2020    Shingles 2009    Sleep  apnea, obstructive     Vertigo     2024   [2]   Past Surgical History:  Procedure Laterality Date    ABDOMINAL SURGERY  Oct 2007    COLONOSCOPY      DILATION AND CURETTAGE OF UTERUS      EGD      ELECTROCONVULSIVE THERAPY      HYSTERECTOMY  2007    KIDNEY SURGERY Right 2009    KNEE SURGERY  Right meniscus repair    NEPHRECTOMY  2009    SINUS SURGERY  1987   [3]   Family History  Problem Relation Name Age of Onset    Heart disease Mother Aura     Arthritis Mother Aura     COPD Mother Aura     Stroke Mother Aura     Thyroid disease Mother Aura     Glaucoma Mother Aura     Colon cancer Father Justice     Neuropathy Father Justice     Cancer Father Justice         Colon    Asthma Sister Gabriella     Depression Sister Gabriella     Mental illness Sister Gabriella     Anxiety disorder Sister Gabriella     Colon cancer Maternal Grandmother Yeimi     Cancer Maternal Grandmother Yeimi         Colon    Diabetes Maternal Grandfather Armen     Mental illness Paternal Grandfather Geoffrey     Heart attack Brother      Heart disease Brother Jonathon          of sudden heart attack    Diabetes Maternal Aunt Amalia     Breast cancer Cousin maternal    [4]   Social History  Tobacco Use    Smoking status: Never     Passive exposure: Never    Smokeless tobacco: Never   Vaping Use    Vaping status: Never Used   Substance Use Topics    Alcohol use: Not Currently     Alcohol/week: 1.0 standard drink of alcohol     Types: 1 Glasses of wine per week     Comment: RARELY    Drug use: Never   [5]   Allergies  Allergen Reactions    Iv Contrast [Iodinated Contrast Media] Vomiting     Vomiting blood    Lamotrigine Hives and Rash     rash    Levofloxacin Hives and Rash     rash    Lurasidone Hives and Rash    Dulaglutide Diarrhea    Iodine Strong Vomiting     Vomiting Blood     Benztropine Diarrhea and Headache     Insomnia, muscle weakness acathisia worsening    Brexpiprazole Other (See Comments)     Feels like something is crawling under her  skin when she sits she starts uncontrollable movement to rock back and off     Mirtazapine Tremor    Escitalopram Other (See Comments)    Food Rash     Eggplant    Lumateperone Anxiety, Irritability and Other (See Comments)   [6]   Current Outpatient Medications:     albuterol (Ventolin HFA) 90 mcg/act inhaler, Inhale 2 puffs every 6 (six) hours as needed for wheezing, Disp: 18 g, Rfl: 5    amantadine (SYMMETREL) 100 mg capsule, Take 100 mg by mouth in the morning and 100 mg in the evening., Disp: , Rfl:     BD Pen Needle Alison 2nd Gen 32G X 4 MM MISC, , Disp: , Rfl:     clonazePAM (KlonoPIN) 1 mg tablet, Take 1 mg by mouth in the morning and 1 mg in the evening., Disp: , Rfl:     ergocalciferol (VITAMIN D2) 50,000 units, , Disp: , Rfl:     gabapentin (NEURONTIN) 300 mg capsule, Take 300 mg by mouth in the morning and 300 mg in the evening and 300 mg before bedtime., Disp: , Rfl:     Ingrezza 40 MG CAPS, , Disp: , Rfl:     levothyroxine 100 mcg tablet, Take 1 tablet (100 mcg total) by mouth daily, Disp: 90 tablet, Rfl: 1    liothyronine (CYTOMEL) 25 mcg tablet, Take 25 mcg by mouth every other day, Disp: , Rfl:     lisinopril (ZESTRIL) 2.5 mg tablet, TAKE 1 TABLET BY MOUTH EVERY DAY, Disp: 90 tablet, Rfl: 1    lithium carbonate (LITHOBID) 300 mg CR tablet, 300 mg in the morning 2 tabs daily (Patient taking differently: 300 mg in the morning 1 tab), Disp: , Rfl:     Ozempic, 1 MG/DOSE, 4 MG/3ML injection pen, , Disp: , Rfl:     pantoprazole (PROTONIX) 20 mg tablet, Take 1 tablet (20 mg total) by mouth daily, Disp: 90 tablet, Rfl: 3    propranolol (INDERAL) 10 mg tablet, Take 1 tablet (10 mg total) by mouth 3 (three) times a day as needed (For akathisia), Disp: 30 tablet, Rfl: 5    propranolol (INDERAL) 20 mg tablet, Take 20 mg by mouth every 12 (twelve) hours, Disp: , Rfl:     senna (SENOKOT) 8.6 mg, Take 2 tablets (17.2 mg total) by mouth daily at bedtime, Disp: 60 tablet, Rfl: 11    SUMAtriptan (IMITREX) 50 mg  tablet, Take 50 mg by mouth daily as needed, Disp: , Rfl:     traZODone (DESYREL) 50 mg tablet, Take 50 mg by mouth daily at bedtime (Patient taking differently: Take 100 mg by mouth daily at bedtime), Disp: , Rfl:     Vraylar 1.5 MG capsule, Take 2.5 mg by mouth in the morning., Disp: , Rfl:     mirtazapine (REMERON) 7.5 MG tablet, take 1 tablet (7.5 mg) by oral route once daily at bedtime (Patient not taking: Reported on 6/10/2025), Disp: , Rfl:     morphine (MSIR) 15 mg tablet, Take 1 tablet (15 mg total) by mouth every 6 (six) hours as needed for severe pain for up to 15 doses Max Daily Amount: 60 mg (Patient not taking: Reported on 6/10/2025), Disp: 15 tablet, Rfl: 0

## 2025-06-16 NOTE — ASSESSMENT & PLAN NOTE
- continue propranolol 20mg BID   - increase Klonopin to 0.25mg in the morning and 1mg at night.   Orders:    clonazePAM (KlonoPIN) 0.25 MG disintegrating tablet; Take 1 tablet (0.25 mg total) by mouth in the morning

## 2025-06-20 ENCOUNTER — PROCEDURE VISIT (OUTPATIENT)
Dept: OBGYN CLINIC | Facility: CLINIC | Age: 65
End: 2025-06-20
Payer: MEDICARE

## 2025-06-20 VITALS — BODY MASS INDEX: 34.72 KG/M2 | HEIGHT: 66 IN | WEIGHT: 216 LBS

## 2025-06-20 DIAGNOSIS — M17.0 PRIMARY OSTEOARTHRITIS OF BOTH KNEES: Primary | ICD-10-CM

## 2025-06-20 PROCEDURE — 20610 DRAIN/INJ JOINT/BURSA W/O US: CPT | Performed by: FAMILY MEDICINE

## 2025-06-20 NOTE — PROGRESS NOTES
Large joint arthrocentesis: bilateral knee    Performed by: Cyrus Pierce DO  Authorized by: Cyrus Pierce DO    Universal Protocol:  Consent: Verbal consent obtained  Risks and benefits: risks, benefits and alternatives were discussed  Consent given by: patient  Patient identity confirmed: verbally with patient  Supporting Documentation  Indications: pain     Is this a Visco injection? Yes  Non-Pharmacologic Treatments Attempted: Diet and Physical Therapy  Pharmacologic Treatments Attempted: Nsaid, csi  Pain Score: 7Procedure Details  Location: knee - bilateral knee  Preparation: Patient was prepped and draped in the usual sterile fashion  Needle size: 22 G  Ultrasound guidance: no  Approach: anterolateral    Medications (Right): 3 mL sodium hyaluronate 60 MG/3MLMedications (Left): 3 mL sodium hyaluronate 60 MG/3ML   Patient tolerance: patient tolerated the procedure well with no immediate complications

## 2025-07-10 ENCOUNTER — TELEPHONE (OUTPATIENT)
Age: 65
End: 2025-07-10

## 2025-07-10 DIAGNOSIS — R26.89 BALANCE PROBLEM: ICD-10-CM

## 2025-07-10 DIAGNOSIS — G24.01 TARDIVE DYSKINESIA: Primary | ICD-10-CM

## 2025-07-10 NOTE — TELEPHONE ENCOUNTER
Patient called asking for a PT referral to please be placed for her gait and balance. She states it is due to her Tardive dyskinesia diagnosis she is needing some therapy to help with symptoms.   She would like referral to be placed so she can go to [facility] physical therapy in [place].

## 2025-07-16 NOTE — TELEPHONE ENCOUNTER
Debi with [facility] calling for PT referral to be faxed to [phone]. E-fax attempted.

## 2025-07-31 ENCOUNTER — TELEPHONE (OUTPATIENT)
Age: 65
End: 2025-07-31

## 2025-08-14 ENCOUNTER — TELEPHONE (OUTPATIENT)
Dept: NEPHROLOGY | Facility: CLINIC | Age: 65
End: 2025-08-14

## 2025-08-15 DIAGNOSIS — G25.71 AKATHISIA: ICD-10-CM

## 2025-08-18 RX ORDER — CLONAZEPAM 0.25 MG/1
0.25 TABLET, ORALLY DISINTEGRATING ORAL DAILY
Qty: 30 TABLET | Refills: 0 | Status: SHIPPED | OUTPATIENT
Start: 2025-08-18 | End: 2026-02-14

## 2025-08-22 ENCOUNTER — CONSULT (OUTPATIENT)
Dept: PAIN MEDICINE | Facility: CLINIC | Age: 65
End: 2025-08-22
Payer: MEDICARE

## 2025-08-22 ENCOUNTER — TELEPHONE (OUTPATIENT)
Dept: PAIN MEDICINE | Facility: CLINIC | Age: 65
End: 2025-08-22

## 2025-08-22 VITALS — WEIGHT: 227 LBS | BODY MASS INDEX: 36.48 KG/M2 | HEIGHT: 66 IN

## 2025-08-22 DIAGNOSIS — M79.672 BILATERAL FOOT PAIN: ICD-10-CM

## 2025-08-22 DIAGNOSIS — M79.671 BILATERAL FOOT PAIN: ICD-10-CM

## 2025-08-22 DIAGNOSIS — G89.29 CHRONIC BILATERAL LOW BACK PAIN WITH BILATERAL SCIATICA: Primary | ICD-10-CM

## 2025-08-22 DIAGNOSIS — M54.42 CHRONIC BILATERAL LOW BACK PAIN WITH BILATERAL SCIATICA: Primary | ICD-10-CM

## 2025-08-22 DIAGNOSIS — M54.41 CHRONIC BILATERAL LOW BACK PAIN WITH BILATERAL SCIATICA: Primary | ICD-10-CM

## 2025-08-22 PROCEDURE — 99204 OFFICE O/P NEW MOD 45 MIN: CPT | Performed by: STUDENT IN AN ORGANIZED HEALTH CARE EDUCATION/TRAINING PROGRAM
